# Patient Record
Sex: FEMALE | Race: WHITE | Employment: STUDENT | URBAN - METROPOLITAN AREA
[De-identification: names, ages, dates, MRNs, and addresses within clinical notes are randomized per-mention and may not be internally consistent; named-entity substitution may affect disease eponyms.]

---
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
## 2019-07-23 ENCOUNTER — HOSPITAL ENCOUNTER (OUTPATIENT)
Age: 19
Discharge: HOME | End: 2019-07-23
Attending: EMERGENCY MEDICINE
Payer: COMMERCIAL

## 2019-07-23 VITALS
RESPIRATION RATE: 18 BRPM | DIASTOLIC BLOOD PRESSURE: 80 MMHG | TEMPERATURE: 98 F | HEIGHT: 68 IN | WEIGHT: 208 LBS | HEART RATE: 84 BPM | SYSTOLIC BLOOD PRESSURE: 118 MMHG | BODY MASS INDEX: 31.52 KG/M2 | OXYGEN SATURATION: 97 %

## 2019-07-23 DIAGNOSIS — J06.9 VIRAL UPPER RESPIRATORY TRACT INFECTION: Primary | ICD-10-CM

## 2019-07-23 PROCEDURE — 99213 OFFICE O/P EST LOW 20 MIN: CPT

## 2019-07-23 PROCEDURE — 99203 OFFICE O/P NEW LOW 30 MIN: CPT

## 2019-07-23 RX ORDER — ALBUTEROL SULFATE 90 UG/1
2 AEROSOL, METERED RESPIRATORY (INHALATION) EVERY 4 HOURS PRN
Qty: 1 INHALER | Refills: 0 | Status: SHIPPED | OUTPATIENT
Start: 2019-07-23 | End: 2019-08-22

## 2019-07-23 RX ORDER — FLUTICASONE PROPIONATE 50 MCG
2 SPRAY, SUSPENSION (ML) NASAL DAILY
Qty: 16 G | Refills: 0 | Status: SHIPPED | OUTPATIENT
Start: 2019-07-23 | End: 2019-08-22

## 2019-07-23 NOTE — ED NOTES
Increase po fluids discharge inst and follow up care reviewed in detail motrin or tylenol for pain . meds reviewed and inst provided. Call make appointment for follow up care.

## 2019-07-23 NOTE — ED INITIAL ASSESSMENT (HPI)
Feels she has sinus infection with headache for 4 days.  Used motrin with some relief but feels headache

## 2019-07-23 NOTE — ED PROVIDER NOTES
Patient Seen in: [facility] In [address] [place] [address]    History   Patient presents with:  Sinus Problem    Stated Complaint: Dizziness,headache/sinus pressure    HPI      HISTORY OF PRESENT ILLNESS:Patient complains of URI symptoms for 4 days. ED Triage Vitals [07/23/19 7555]   /80   Pulse 84   Resp 18   Temp 98.3 °F (36.8 °C)   Temp src Oral   SpO2 97 %   O2 Device None (Room air)       Current:/80   Pulse 84   Temp 98.3 °F (36.8 °C) (Oral)   Resp 18   Ht 172.7 cm (5' 8\")   Wt

## 2020-07-10 ENCOUNTER — OFFICE VISIT (OUTPATIENT)
Dept: GASTROENTEROLOGY | Facility: CLINIC | Age: 20
End: 2020-07-10
Payer: COMMERCIAL

## 2020-07-10 ENCOUNTER — TELEPHONE (OUTPATIENT)
Dept: GASTROENTEROLOGY | Facility: CLINIC | Age: 20
End: 2020-07-10

## 2020-07-10 ENCOUNTER — LAB ENCOUNTER (OUTPATIENT)
Dept: LAB | Facility: HOSPITAL | Age: 20
End: 2020-07-10
Attending: INTERNAL MEDICINE
Payer: COMMERCIAL

## 2020-07-10 VITALS
BODY MASS INDEX: 33.34 KG/M2 | HEART RATE: 80 BPM | WEIGHT: 220 LBS | SYSTOLIC BLOOD PRESSURE: 122 MMHG | DIASTOLIC BLOOD PRESSURE: 76 MMHG | HEIGHT: 68 IN

## 2020-07-10 DIAGNOSIS — K52.9 CHRONIC DIARRHEA: ICD-10-CM

## 2020-07-10 DIAGNOSIS — R10.9 ABDOMINAL CRAMPING: ICD-10-CM

## 2020-07-10 DIAGNOSIS — R19.7 BLOODY DIARRHEA: Primary | ICD-10-CM

## 2020-07-10 DIAGNOSIS — K52.9 CHRONIC DIARRHEA: Primary | ICD-10-CM

## 2020-07-10 LAB
BASOPHILS # BLD AUTO: 0.03 X10(3) UL (ref 0–0.2)
BASOPHILS NFR BLD AUTO: 0.5 %
CRP SERPL-MCNC: 1.22 MG/DL (ref ?–0.3)
DEPRECATED RDW RBC AUTO: 40.9 FL (ref 35.1–46.3)
EOSINOPHIL # BLD AUTO: 0.15 X10(3) UL (ref 0–0.7)
EOSINOPHIL NFR BLD AUTO: 2.6 %
ERYTHROCYTE [DISTWIDTH] IN BLOOD BY AUTOMATED COUNT: 13.2 % (ref 11–15)
HCT VFR BLD AUTO: 39.3 % (ref 35–48)
HGB BLD-MCNC: 12.5 G/DL (ref 12–16)
IGA SERPL-MCNC: 128 MG/DL (ref 70–312)
IMM GRANULOCYTES # BLD AUTO: 0.01 X10(3) UL (ref 0–1)
IMM GRANULOCYTES NFR BLD: 0.2 %
LYMPHOCYTES # BLD AUTO: 1.96 X10(3) UL (ref 1–4)
LYMPHOCYTES NFR BLD AUTO: 34.6 %
MCH RBC QN AUTO: 26.9 PG (ref 26–34)
MCHC RBC AUTO-ENTMCNC: 31.8 G/DL (ref 31–37)
MCV RBC AUTO: 84.5 FL (ref 80–100)
MONOCYTES # BLD AUTO: 0.45 X10(3) UL (ref 0.1–1)
MONOCYTES NFR BLD AUTO: 7.9 %
NEUTROPHILS # BLD AUTO: 3.07 X10 (3) UL (ref 1.5–7.7)
NEUTROPHILS # BLD AUTO: 3.07 X10(3) UL (ref 1.5–7.7)
NEUTROPHILS NFR BLD AUTO: 54.2 %
PLATELET # BLD AUTO: 397 10(3)UL (ref 150–450)
RBC # BLD AUTO: 4.65 X10(6)UL (ref 3.8–5.3)
TSI SER-ACNC: 3.16 MIU/ML (ref 0.36–3.74)
VIT B12 SERPL-MCNC: 319 PG/ML (ref 193–986)
WBC # BLD AUTO: 5.7 X10(3) UL (ref 4–11)

## 2020-07-10 PROCEDURE — 82785 ASSAY OF IGE: CPT

## 2020-07-10 PROCEDURE — 82607 VITAMIN B-12: CPT

## 2020-07-10 PROCEDURE — 36415 COLL VENOUS BLD VENIPUNCTURE: CPT

## 2020-07-10 PROCEDURE — 84443 ASSAY THYROID STIM HORMONE: CPT

## 2020-07-10 PROCEDURE — 82784 ASSAY IGA/IGD/IGG/IGM EACH: CPT

## 2020-07-10 PROCEDURE — 83516 IMMUNOASSAY NONANTIBODY: CPT

## 2020-07-10 PROCEDURE — 85025 COMPLETE CBC W/AUTO DIFF WBC: CPT

## 2020-07-10 PROCEDURE — 99244 OFF/OP CNSLTJ NEW/EST MOD 40: CPT | Performed by: INTERNAL MEDICINE

## 2020-07-10 PROCEDURE — 86003 ALLG SPEC IGE CRUDE XTRC EA: CPT

## 2020-07-10 PROCEDURE — 86140 C-REACTIVE PROTEIN: CPT

## 2020-07-10 RX ORDER — ESCITALOPRAM OXALATE 20 MG/1
TABLET ORAL
COMMUNITY
Start: 2018-01-07 | End: 2020-07-10

## 2020-07-10 RX ORDER — IBUPROFEN 600 MG/1
600 TABLET ORAL
COMMUNITY
Start: 2016-10-05 | End: 2021-09-02

## 2020-07-10 NOTE — TELEPHONE ENCOUNTER
Scheduled for:  Colonoscopy 47294  Provider Name: Dr Salvador Brooks  Date: Wed 8/19/2020  Location: [facility]   Sedation: MAC  Time: 1:30 pm   Prep: split dose suprep   Meds/Allergies Reconciled?:  Aluminum  Diagnosis with codes:  Bloody diarrhea R19.7  Was patient infor

## 2020-07-10 NOTE — H&P
[facility], [facility] - Gastroenterology                                                                                                               Reason for consult: d 2-20 Years) data. History, Medications, Allergies, ROS:      History reviewed. No pertinent past medical history. History reviewed. No pertinent surgical history.    Family Hx:   Family History   Problem Relation Age of Onset   • Heart Disease Mother kg).    Gen- Patient appears comfortable and in no acute discomfort  HEENT: the sclera appears anicteric, oropharynx clear, mucus membranes appear moist  CV- regular rate and rhythm, the extremities are warm and well perfused   Lung- Moves air well;  No lab with MAC [Diagnosis: bloody diarrhea]    2.  bowel prep from pharmacy (split suprep)    3. Continue all medications for procedure    4. Read all bowel prep instructions carefully    5.  AVOID seeds, nuts, popcorn, raw fruits and vegetables (cooked is discrepancies may still exist.

## 2020-07-10 NOTE — PATIENT INSTRUCTIONS
1. Schedule colonoscopy with MAC [Diagnosis: bloody diarrhea]    2.  bowel prep from pharmacy (split suprep)    3. Continue all medications for procedure    4. Read all bowel prep instructions carefully    5.  AVOID seeds, nuts, popcorn, raw fruits a

## 2020-07-14 LAB
CLAM IGE QN: <0.1 KUA/L (ref ?–0.1)
CODFISH IGE QN: <0.1 KUA/L (ref ?–0.1)
CORN IGE QN: <0.1 KUA/L (ref ?–0.1)
COW MILK IGE QN: <0.1 KUA/L (ref ?–0.1)
EGG WHITE IGE QN: <0.1 KUA/L (ref ?–0.1)
IGE SERPL-ACNC: 30.7 KU/L (ref 2–214)
PEANUT IGE QN: <0.1 KUA/L (ref ?–0.1)
SCALLOP IGE QN: <0.1 KUA/L (ref ?–0.1)
SESAME SEED IGE QN: <0.1 KUA/L (ref ?–0.1)
SHRIMP IGE QN: <0.1 KUA/L (ref ?–0.1)
SOYBEAN IGE QN: <0.1 KUA/L (ref ?–0.1)
TTG IGA SER-ACNC: 0.3 U/ML (ref ?–7)
WALNUT IGE QN: <0.1 KUA/L (ref ?–0.1)
WHEAT IGE QN: <0.1 KUA/L (ref ?–0.1)

## 2020-07-16 ENCOUNTER — TELEPHONE (OUTPATIENT)
Dept: GASTROENTEROLOGY | Facility: CLINIC | Age: 20
End: 2020-07-16

## 2020-07-16 NOTE — TELEPHONE ENCOUNTER
Reviewed my chart documents patient sent:    -June 2020 labs show: normal TSH, neg Giardia, neg crypto, neg cdiff in stool.     -Labs from [facility] reviewed--neg celiac disease.  Neg food allergy panel.     -B12 low normal, okay to start Vitamin B12 1000mcg Oral

## 2020-07-16 NOTE — TELEPHONE ENCOUNTER
CRP mildly elevated, we discussed this. Not significant elevation, but will investigate with c-scope as she has diarrhea issues.

## 2020-08-18 ENCOUNTER — LAB REQUISITION (OUTPATIENT)
Dept: LAB | Facility: HOSPITAL | Age: 20
End: 2020-08-18
Payer: COMMERCIAL

## 2020-08-18 DIAGNOSIS — Z20.828 CONTACT WITH AND (SUSPECTED) EXPOSURE TO OTHER VIRAL COMMUNICABLE DISEASES: ICD-10-CM

## 2020-08-18 LAB — SARS-COV-2 RNA RESP QL NAA+PROBE: NOT DETECTED

## 2020-08-19 ENCOUNTER — ANESTHESIA (OUTPATIENT)
Dept: ENDOSCOPY | Facility: HOSPITAL | Age: 20
End: 2020-08-19
Payer: COMMERCIAL

## 2020-08-19 ENCOUNTER — ANESTHESIA EVENT (OUTPATIENT)
Dept: ENDOSCOPY | Facility: HOSPITAL | Age: 20
End: 2020-08-19
Payer: COMMERCIAL

## 2020-08-19 ENCOUNTER — HOSPITAL ENCOUNTER (OUTPATIENT)
Facility: HOSPITAL | Age: 20
Setting detail: HOSPITAL OUTPATIENT SURGERY
Discharge: HOME | End: 2020-08-19
Attending: INTERNAL MEDICINE | Admitting: INTERNAL MEDICINE
Payer: COMMERCIAL

## 2020-08-19 VITALS
SYSTOLIC BLOOD PRESSURE: 108 MMHG | RESPIRATION RATE: 15 BRPM | TEMPERATURE: 98 F | HEIGHT: 69 IN | BODY MASS INDEX: 31.84 KG/M2 | WEIGHT: 215 LBS | OXYGEN SATURATION: 99 % | DIASTOLIC BLOOD PRESSURE: 72 MMHG | HEART RATE: 66 BPM

## 2020-08-19 DIAGNOSIS — R19.7 BLOODY DIARRHEA: ICD-10-CM

## 2020-08-19 LAB — B-HCG UR QL: NEGATIVE

## 2020-08-19 PROCEDURE — 0DBP8ZX EXCISION OF RECTUM, VIA NATURAL OR ARTIFICIAL OPENING ENDOSCOPIC, DIAGNOSTIC: ICD-10-PCS | Performed by: INTERNAL MEDICINE

## 2020-08-19 PROCEDURE — 0DBL8ZX EXCISION OF TRANSVERSE COLON, VIA NATURAL OR ARTIFICIAL OPENING ENDOSCOPIC, DIAGNOSTIC: ICD-10-PCS | Performed by: INTERNAL MEDICINE

## 2020-08-19 PROCEDURE — 0DBK8ZX EXCISION OF ASCENDING COLON, VIA NATURAL OR ARTIFICIAL OPENING ENDOSCOPIC, DIAGNOSTIC: ICD-10-PCS | Performed by: INTERNAL MEDICINE

## 2020-08-19 PROCEDURE — 0DBM8ZX EXCISION OF DESCENDING COLON, VIA NATURAL OR ARTIFICIAL OPENING ENDOSCOPIC, DIAGNOSTIC: ICD-10-PCS | Performed by: INTERNAL MEDICINE

## 2020-08-19 PROCEDURE — 45385 COLONOSCOPY W/LESION REMOVAL: CPT | Performed by: INTERNAL MEDICINE

## 2020-08-19 PROCEDURE — 45380 COLONOSCOPY AND BIOPSY: CPT | Performed by: INTERNAL MEDICINE

## 2020-08-19 RX ORDER — NALOXONE HYDROCHLORIDE 0.4 MG/ML
80 INJECTION, SOLUTION INTRAMUSCULAR; INTRAVENOUS; SUBCUTANEOUS AS NEEDED
Status: DISCONTINUED | OUTPATIENT
Start: 2020-08-19 | End: 2020-08-19

## 2020-08-19 RX ORDER — SODIUM CHLORIDE, SODIUM LACTATE, POTASSIUM CHLORIDE, CALCIUM CHLORIDE 600; 310; 30; 20 MG/100ML; MG/100ML; MG/100ML; MG/100ML
INJECTION, SOLUTION INTRAVENOUS CONTINUOUS
Status: DISCONTINUED | OUTPATIENT
Start: 2020-08-19 | End: 2020-08-19

## 2020-08-19 RX ORDER — LIDOCAINE HYDROCHLORIDE 10 MG/ML
INJECTION, SOLUTION EPIDURAL; INFILTRATION; INTRACAUDAL; PERINEURAL AS NEEDED
Status: DISCONTINUED | OUTPATIENT
Start: 2020-08-19 | End: 2020-08-19 | Stop reason: SURG

## 2020-08-19 RX ADMIN — LIDOCAINE HYDROCHLORIDE 50 MG: 10 INJECTION, SOLUTION EPIDURAL; INFILTRATION; INTRACAUDAL; PERINEURAL at 13:28:00

## 2020-08-19 RX ADMIN — SODIUM CHLORIDE, SODIUM LACTATE, POTASSIUM CHLORIDE, CALCIUM CHLORIDE: 600; 310; 30; 20 INJECTION, SOLUTION INTRAVENOUS at 13:24:00

## 2020-08-19 NOTE — ANESTHESIA POSTPROCEDURE EVALUATION
Patient: Vy Yepez    Procedure Summary     Date:  08/19/20 Room / Location:  [facility] ENDOSCOPY 04 / [facility] ENDOSCOPY    Anesthesia Start:  1819 Anesthesia Stop:  4908    Procedure:  COLONOSCOPY (N/A ) Diagnosis:       Bloody diarrhea      (Colitis, p

## 2020-08-19 NOTE — H&P
History & Physical Examination    Patient Name: Luis Lam  MRN: [id number]  CSN: [id number]  YOB: 2000    Diagnosis: change in bowels, bloody diarrhea    ibuprofen 600 MG Oral Tab, Take 600 mg by mouth., Disp: , Rfl: ,  at PRN  esc reviewed the History and Physical done within the last 30 days. Any changes noted above.     Izabel Schultz, [address] [place] [address] - Gastroenterology  8/19/2020  1:19 PM

## 2020-08-19 NOTE — OPERATIVE REPORT
COLONOSCOPY REPORT    Tiny Sheldon     2000 Age 23year old   PCP Janice Julian DO Endoscopist Kerrie Craig MD     Date of procedure: 20    Procedure: Colonoscopy w/cold biopsy and cold snare polypectomy    Pre-operative diag retroflexed view of the rectum revealed mild proctitis. 5. Upon entering the rectum, there was mild erythema and granularity noted circumferentially throughout the rectum and sigmoid colon, extending to 30 cm from entry. Biopsies obtained.  Proximal to 3

## 2020-08-19 NOTE — ANESTHESIA PREPROCEDURE EVALUATION
Anesthesia PreOp Note    HPI:     Felicity Scott is a 23year old female who presents for preoperative consultation requested by: Mary Suazo MD    Date of Surgery: 8/19/2020    Procedure(s):  COLONOSCOPY  Indication: Bloody diarrhea    Relevan Other (early death) Paternal Grandfather         47 - cancer     Social History    Socioeconomic History      Marital status: Single      Spouse name: Not on file      Number of children: Not on file      Years of education: Not on file      Highest educat HGB 12.5 07/10/2020    HCT 39.3 07/10/2020    MCV 84.5 07/10/2020    MCH 26.9 07/10/2020    MCHC 31.8 07/10/2020    RDW 13.2 07/10/2020    .0 07/10/2020    URINEPREG Negative 08/19/2020             Vital Signs: Body mass index is 31.75 kg/m².    hei

## 2020-08-23 ENCOUNTER — TELEPHONE (OUTPATIENT)
Dept: GASTROENTEROLOGY | Facility: CLINIC | Age: 20
End: 2020-08-23

## 2020-08-23 NOTE — TELEPHONE ENCOUNTER
D/w patient re: C-scope pathology confirming dx of UC, mostly in lower L colon. I think enemas would be better than suppositories given it extends proximal to rect-sigmoid. She agrees to plan for mesalamine enemas x 6 weeks, then see me in clinic for f/u.

## 2020-08-24 ENCOUNTER — TELEPHONE (OUTPATIENT)
Dept: GASTROENTEROLOGY | Facility: CLINIC | Age: 20
End: 2020-08-24

## 2020-08-24 NOTE — TELEPHONE ENCOUNTER
Drug Change Request:    Plan does not cover medication prescribed. Per RX benefit plan alternative medications include:  Please fax back with approval along with strength, directions, quantity and refills:  Drugs not covered.     Current Outpatient Medicat

## 2020-08-25 NOTE — TELEPHONE ENCOUNTER
GI Clinical Staff:   Can you clarify what medications are covered? I think the message is cut off. I would like her to get some form of rectal mesalamine.

## 2020-08-25 NOTE — TELEPHONE ENCOUNTER
Dr. Jose Escalante, spoke to [facility] from [address], was informed we can disregard fax received as there was an issue with NDC that was ran through insurance. \[facility] [place] [facility]\"" Rx was reprocessed through [employer] and it is covered.

## 2020-08-25 NOTE — TELEPHONE ENCOUNTER
Dr. Charleen Torres, please see message below regarding alternative medications that are covered by patients insurance. Thank you.

## 2020-09-06 ENCOUNTER — PATIENT MESSAGE (OUTPATIENT)
Dept: GASTROENTEROLOGY | Facility: CLINIC | Age: 20
End: 2020-09-06

## 2020-09-08 NOTE — TELEPHONE ENCOUNTER
Pt contacted and informed to call the office when having acute sxs as mychart is for non-urgent matters only and typically a 72 hour turnaround time. She states she was aware it would take a couple of days for a response.      S/p CLN on 8/19/2020, pt began

## 2020-09-08 NOTE — TELEPHONE ENCOUNTER
From: Sonido Young  To: Amy Jiménez MD  Sent: 9/6/2020 3:20 PM CDT  Subject: Non-Urgent Medical Question    Hi Dr Alma Monsivais! I've been using the Mesalamine medication you gave me, and I haven't really been noticing a difference.  I am on the second

## 2020-09-09 RX ORDER — DICYCLOMINE HCL 20 MG
20 TABLET ORAL 3 TIMES DAILY PRN
Qty: 90 TABLET | Refills: 0 | Status: SHIPPED | OUTPATIENT
Start: 2020-09-09 | End: 2021-04-14

## 2020-09-09 NOTE — TELEPHONE ENCOUNTER
D/w patient -- some cramping and ab pain after eating, possibly IBS-post-inflammatory. Will start dicyclomine prn. Risks/benefits discussed. She also is having some hair loss, start MTV daily with biotin.

## 2020-10-16 ENCOUNTER — APPOINTMENT (OUTPATIENT)
Dept: LAB | Facility: HOSPITAL | Age: 20
End: 2020-10-16
Attending: INTERNAL MEDICINE
Payer: COMMERCIAL

## 2020-10-16 ENCOUNTER — HOSPITAL ENCOUNTER (OUTPATIENT)
Age: 20
Discharge: HOME | End: 2020-10-16
Attending: EMERGENCY MEDICINE
Payer: COMMERCIAL

## 2020-10-16 VITALS
TEMPERATURE: 97 F | RESPIRATION RATE: 20 BRPM | OXYGEN SATURATION: 99 % | SYSTOLIC BLOOD PRESSURE: 128 MMHG | WEIGHT: 215 LBS | HEIGHT: 69 IN | DIASTOLIC BLOOD PRESSURE: 81 MMHG | BODY MASS INDEX: 31.84 KG/M2 | HEART RATE: 90 BPM

## 2020-10-16 DIAGNOSIS — J32.9 RHINOSINUSITIS: ICD-10-CM

## 2020-10-16 DIAGNOSIS — R09.81 SINUS CONGESTION: Primary | ICD-10-CM

## 2020-10-16 DIAGNOSIS — J31.0 RHINOSINUSITIS: ICD-10-CM

## 2020-10-16 PROCEDURE — 99213 OFFICE O/P EST LOW 20 MIN: CPT | Performed by: EMERGENCY MEDICINE

## 2020-10-16 RX ORDER — FLUTICASONE PROPIONATE 50 MCG
1-2 SPRAY, SUSPENSION (ML) NASAL DAILY
Qty: 16 G | Refills: 0 | Status: SHIPPED | OUTPATIENT
Start: 2020-10-16 | End: 2020-11-15

## 2020-10-16 NOTE — ED PROVIDER NOTES
Patient Seen in: [facility]      History   Patient presents with:  Sinus Problem    Stated Complaint: congested sinus    HPI  Patient complains of 3 days of runny nose, postnasal drip and fullness in the maxillary sinuses.   No fevers or chill well-developed. Comments: Well appearing   HENT:      Head: Normocephalic and atraumatic. Right Ear: Tympanic membrane and external ear normal.      Left Ear: Tympanic membrane and external ear normal.      Nose: Rhinorrhea present.       Right Si [address] [facility] [address]  [phone]    Schedule an appointment as soon as possible for a visit in 1 week  For follow-up          Medications Prescribed:  Current Discharge Medication List    START taking these medications    Fluticasone Propionate 50

## 2020-10-17 ENCOUNTER — HOSPITAL ENCOUNTER (OUTPATIENT)
Age: 20
Discharge: HOME | End: 2020-10-17
Attending: EMERGENCY MEDICINE
Payer: COMMERCIAL

## 2020-10-17 VITALS
TEMPERATURE: 98 F | RESPIRATION RATE: 18 BRPM | OXYGEN SATURATION: 98 % | HEART RATE: 72 BPM | SYSTOLIC BLOOD PRESSURE: 131 MMHG | DIASTOLIC BLOOD PRESSURE: 72 MMHG | HEIGHT: 69 IN | WEIGHT: 215 LBS | BODY MASS INDEX: 31.84 KG/M2

## 2020-10-17 DIAGNOSIS — Z20.822 ENCOUNTER FOR LABORATORY TESTING FOR COVID-19 VIRUS: Primary | ICD-10-CM

## 2020-10-17 DIAGNOSIS — J06.9 VIRAL UPPER RESPIRATORY INFECTION: ICD-10-CM

## 2020-10-17 PROCEDURE — 99213 OFFICE O/P EST LOW 20 MIN: CPT | Performed by: EMERGENCY MEDICINE

## 2020-10-17 PROCEDURE — U0003 INFECTIOUS AGENT DETECTION BY NUCLEIC ACID (DNA OR RNA); SEVERE ACUTE RESPIRATORY SYNDROME CORONAVIRUS 2 (SARS-COV-2) (CORONAVIRUS DISEASE [COVID-19]), AMPLIFIED PROBE TECHNIQUE, MAKING USE OF HIGH THROUGHPUT TECHNOLOGIES AS DESCRIBED BY CMS-2020-01-R: HCPCS | Performed by: EMERGENCY MEDICINE

## 2020-10-17 NOTE — ED PROVIDER NOTES
Patient Seen in: [facility]      History   Patient presents with:  Testing  Cough/URI    Stated Complaint: cough/congested/wants testing/post nasal drip    HPI  Patient was seen here yesterday by me for upper respiratory congestion postnasal External ear normal.      Nose: Rhinorrhea present. Mouth/Throat:      Mouth: Mucous membranes are moist.      Pharynx: Oropharynx is clear.    Eyes:      Conjunctiva/sclera: Conjunctivae normal.   Neck:      Musculoskeletal: Normal range of motion and

## 2020-10-30 ENCOUNTER — OFFICE VISIT (OUTPATIENT)
Dept: GASTROENTEROLOGY | Facility: CLINIC | Age: 20
End: 2020-10-30
Payer: COMMERCIAL

## 2020-10-30 VITALS
WEIGHT: 224.38 LBS | DIASTOLIC BLOOD PRESSURE: 78 MMHG | SYSTOLIC BLOOD PRESSURE: 127 MMHG | HEIGHT: 69 IN | HEART RATE: 74 BPM | BODY MASS INDEX: 33.23 KG/M2

## 2020-10-30 DIAGNOSIS — K52.9 COLITIS: ICD-10-CM

## 2020-10-30 DIAGNOSIS — R53.82 CHRONIC FATIGUE: ICD-10-CM

## 2020-10-30 DIAGNOSIS — K51.311 ULCERATIVE RECTOSIGMOIDITIS WITH RECTAL BLEEDING (HCC): Primary | ICD-10-CM

## 2020-10-30 DIAGNOSIS — K52.9 CHRONIC DIARRHEA: ICD-10-CM

## 2020-10-30 PROCEDURE — 3008F BODY MASS INDEX DOCD: CPT | Performed by: INTERNAL MEDICINE

## 2020-10-30 PROCEDURE — 99214 OFFICE O/P EST MOD 30 MIN: CPT | Performed by: INTERNAL MEDICINE

## 2020-10-30 PROCEDURE — 3078F DIAST BP <80 MM HG: CPT | Performed by: INTERNAL MEDICINE

## 2020-10-30 PROCEDURE — 3074F SYST BP LT 130 MM HG: CPT | Performed by: INTERNAL MEDICINE

## 2020-10-30 RX ORDER — MESALAMINE 1.2 G/1
TABLET, DELAYED RELEASE ORAL
Qty: 180 TABLET | Refills: 0 | Status: SHIPPED | OUTPATIENT
Start: 2020-10-30 | End: 2021-07-23

## 2020-10-30 NOTE — PROGRESS NOTES
[facility] Follow-up Visit    Arti 46 - cancer      Social History: Social History    Tobacco Use      Smoking status: Never Smoker      Smokeless tobacco: Never Used    Alcohol use: Yes      Frequency: 2-4 times a month      Drinks per session: 1 or 2      Binge frequency: Never exam in all quadrants without rigidity or guarding, non-distended, no abnormal bowel sounds noted, no masses are palpated  Skin- No jaundice  Ext: no cyanosis, clubbing or edema is evident.    Neuro- Alert and oriented x4, and patient is having movement of

## 2020-10-30 NOTE — PATIENT INSTRUCTIONS
1. Start mesalamine 4.8 grams/day x 4 weeks, then reduce to 2.4grams daily long-term  2. Blood work anytime after 11/15 (non-fasting)  3.  Avoid NSAIDS

## 2020-11-30 ENCOUNTER — LAB ENCOUNTER (OUTPATIENT)
Dept: LAB | Facility: HOSPITAL | Age: 20
End: 2020-11-30
Attending: INTERNAL MEDICINE
Payer: COMMERCIAL

## 2020-11-30 DIAGNOSIS — R53.82 CHRONIC FATIGUE: ICD-10-CM

## 2020-11-30 DIAGNOSIS — K51.311 ULCERATIVE RECTOSIGMOIDITIS WITH RECTAL BLEEDING (HCC): ICD-10-CM

## 2020-11-30 PROCEDURE — 80053 COMPREHEN METABOLIC PANEL: CPT

## 2020-11-30 PROCEDURE — 36415 COLL VENOUS BLD VENIPUNCTURE: CPT

## 2020-11-30 PROCEDURE — 85025 COMPLETE CBC W/AUTO DIFF WBC: CPT

## 2020-11-30 PROCEDURE — 82306 VITAMIN D 25 HYDROXY: CPT

## 2020-12-04 ENCOUNTER — TELEPHONE (OUTPATIENT)
Dept: GASTROENTEROLOGY | Facility: CLINIC | Age: 20
End: 2020-12-04

## 2020-12-04 ENCOUNTER — PATIENT MESSAGE (OUTPATIENT)
Dept: GASTROENTEROLOGY | Facility: CLINIC | Age: 20
End: 2020-12-04

## 2020-12-04 RX ORDER — ERGOCALCIFEROL 1.25 MG/1
50000 CAPSULE ORAL
Qty: 8 CAPSULE | Refills: 0 | Status: SHIPPED | OUTPATIENT
Start: 2020-12-04 | End: 2021-01-23

## 2020-12-04 NOTE — TELEPHONE ENCOUNTER
D/w patient---vit D low, sent high dose replacement to pharmacy. She also was doing well on 4.8g/day of mesalamine, but started having sx on 2.4 g/day with diarrhea and bleeding.  I asked her to go back to 4.8g/d of mesalamine for anotherr month and then

## 2020-12-07 NOTE — TELEPHONE ENCOUNTER
From: Mini Galindo  To: Cecille Ivory MD  Sent: 12/4/2020 12:12 PM CST  Subject: Non-Urgent Medical Question    Hello! How are you? How was your thanksgiving? I'm concerned about my stool.  I started taking the mesalomine tablets 2 times a day in

## 2021-01-29 ENCOUNTER — LAB ENCOUNTER (OUTPATIENT)
Dept: LAB | Facility: HOSPITAL | Age: 21
End: 2021-01-29
Attending: INTERNAL MEDICINE
Payer: COMMERCIAL

## 2021-01-29 ENCOUNTER — OFFICE VISIT (OUTPATIENT)
Dept: GASTROENTEROLOGY | Facility: CLINIC | Age: 21
End: 2021-01-29
Payer: COMMERCIAL

## 2021-01-29 DIAGNOSIS — K51.918 ULCERATIVE COLITIS WITH OTHER COMPLICATION, UNSPECIFIED LOCATION (HCC): ICD-10-CM

## 2021-01-29 DIAGNOSIS — K51.918 ULCERATIVE COLITIS WITH OTHER COMPLICATION, UNSPECIFIED LOCATION (HCC): Primary | ICD-10-CM

## 2021-01-29 LAB
ALBUMIN SERPL-MCNC: 3.3 G/DL (ref 3.4–5)
ALBUMIN/GLOB SERPL: 0.7 {RATIO} (ref 1–2)
ALP LIVER SERPL-CCNC: 49 U/L
ALT SERPL-CCNC: 24 U/L
ANION GAP SERPL CALC-SCNC: 4 MMOL/L (ref 0–18)
AST SERPL-CCNC: 20 U/L (ref 15–37)
BASOPHILS # BLD AUTO: 0.03 X10(3) UL (ref 0–0.2)
BASOPHILS NFR BLD AUTO: 0.6 %
BILIRUB SERPL-MCNC: 0.3 MG/DL (ref 0.1–2)
BUN BLD-MCNC: 10 MG/DL (ref 7–18)
BUN/CREAT SERPL: 14.7 (ref 10–20)
CALCIUM BLD-MCNC: 8.8 MG/DL (ref 8.5–10.1)
CHLORIDE SERPL-SCNC: 108 MMOL/L (ref 98–112)
CO2 SERPL-SCNC: 28 MMOL/L (ref 21–32)
CREAT BLD-MCNC: 0.68 MG/DL
CRP SERPL-MCNC: 1.07 MG/DL (ref ?–0.3)
DEPRECATED RDW RBC AUTO: 43.2 FL (ref 35.1–46.3)
EOSINOPHIL # BLD AUTO: 0.26 X10(3) UL (ref 0–0.7)
EOSINOPHIL NFR BLD AUTO: 5 %
ERYTHROCYTE [DISTWIDTH] IN BLOOD BY AUTOMATED COUNT: 14.1 % (ref 11–15)
GLOBULIN PLAS-MCNC: 4.5 G/DL (ref 2.8–4.4)
GLUCOSE BLD-MCNC: 80 MG/DL (ref 70–99)
HCT VFR BLD AUTO: 38.2 %
HGB BLD-MCNC: 11.7 G/DL
IMM GRANULOCYTES # BLD AUTO: 0.01 X10(3) UL (ref 0–1)
IMM GRANULOCYTES NFR BLD: 0.2 %
LYMPHOCYTES # BLD AUTO: 1.78 X10(3) UL (ref 1–4)
LYMPHOCYTES NFR BLD AUTO: 34.5 %
M PROTEIN MFR SERPL ELPH: 7.8 G/DL (ref 6.4–8.2)
MCH RBC QN AUTO: 25.8 PG (ref 26–34)
MCHC RBC AUTO-ENTMCNC: 30.6 G/DL (ref 31–37)
MCV RBC AUTO: 84.3 FL
MONOCYTES # BLD AUTO: 0.42 X10(3) UL (ref 0.1–1)
MONOCYTES NFR BLD AUTO: 8.1 %
NEUTROPHILS # BLD AUTO: 2.66 X10 (3) UL (ref 1.5–7.7)
NEUTROPHILS # BLD AUTO: 2.66 X10(3) UL (ref 1.5–7.7)
NEUTROPHILS NFR BLD AUTO: 51.6 %
OSMOLALITY SERPL CALC.SUM OF ELEC: 288 MOSM/KG (ref 275–295)
PATIENT FASTING Y/N/NP: YES
PLATELET # BLD AUTO: 348 10(3)UL (ref 150–450)
POTASSIUM SERPL-SCNC: 4.2 MMOL/L (ref 3.5–5.1)
RBC # BLD AUTO: 4.53 X10(6)UL
SODIUM SERPL-SCNC: 140 MMOL/L (ref 136–145)
WBC # BLD AUTO: 5.2 X10(3) UL (ref 4–11)

## 2021-01-29 PROCEDURE — 99214 OFFICE O/P EST MOD 30 MIN: CPT | Performed by: INTERNAL MEDICINE

## 2021-01-29 PROCEDURE — 86140 C-REACTIVE PROTEIN: CPT

## 2021-01-29 PROCEDURE — 36415 COLL VENOUS BLD VENIPUNCTURE: CPT

## 2021-01-29 PROCEDURE — 80053 COMPREHEN METABOLIC PANEL: CPT

## 2021-01-29 PROCEDURE — 85025 COMPLETE CBC W/AUTO DIFF WBC: CPT

## 2021-01-29 RX ORDER — BUDESONIDE 3 MG/1
9 CAPSULE, COATED PELLETS ORAL EVERY MORNING
Qty: 90 CAPSULE | Refills: 1 | Status: SHIPPED | OUTPATIENT
Start: 2021-01-29 | End: 2021-02-28

## 2021-01-29 RX ORDER — MESALAMINE 1.2 G/1
2.4 TABLET, DELAYED RELEASE ORAL DAILY
Qty: 60 TABLET | Refills: 1 | Status: SHIPPED | OUTPATIENT
Start: 2021-01-29 | End: 2021-03-30

## 2021-01-29 NOTE — PROGRESS NOTES
[address] [facility] [address] Gastroenterology                                                                                                      Clinic Follow-up Visit    No c 1 or 2      Binge frequency: Never    Drug use: Not Currently       Medications (Active prior to today's visit):  Current Outpatient Medications   Medication Sig Dispense Refill   • Budesonide 3 MG Oral Cap DR Particles Take 3 capsules (9 mg total) by aura discussed with patient today. .  ASSESSMENT/PLAN:   25 yo F with hx of UC who presents for f/u, on mesalamine.     #L sided UC  -sigmoid/rectum, failed mesaslamine enemas, now on mesalamine 2.4/gday, improved, but still with blood mucus consistently, th

## 2021-03-10 ENCOUNTER — TELEPHONE (OUTPATIENT)
Dept: GASTROENTEROLOGY | Facility: CLINIC | Age: 21
End: 2021-03-10

## 2021-03-10 DIAGNOSIS — K51.919 ULCERATIVE COLITIS WITH COMPLICATION, UNSPECIFIED LOCATION (HCC): Primary | ICD-10-CM

## 2021-03-10 NOTE — TELEPHONE ENCOUNTER
Called patient and discussed about her condition. Stated that she had panic attack yesterday and the same symptoms she had with hyperthyroid in past.  At present she is feeling weak but denies any palpitations. Stated occasionally she has noticed anxiety.    Her levothyroxine dose was decreased earlier based on her 12/2017 labs.     Labs are ordered.  Advise to go to ER if noticed any worrisome symptoms.  Voiced understanding.      Please schedule: Flex-sig with IV sedation.      Two fleets enemas the AM of the test    Diagnosis: ulcerative colitis    Medication adjustments:  Day before procedure, hold: none  Day of procedure, hold: none      >>>Please make sure patient remains on mes

## 2021-03-18 NOTE — TELEPHONE ENCOUNTER
Scheduled for:  Flexible Sigmoidoscopy 94681  Provider Name:  Dr. Stephanie Randhawa  Date:  4/20/21  Location:    [facility]  Sedation:  IV  Time:  6138 (pt is aware to arrive at 1345)   Prep:  Two Fleets enemas, 1st admin at 1130, 2nd admin at 1230   Sent via Who@/

## 2021-04-14 ENCOUNTER — TELEPHONE (OUTPATIENT)
Dept: GASTROENTEROLOGY | Facility: CLINIC | Age: 21
End: 2021-04-14

## 2021-04-14 RX ORDER — DICYCLOMINE HCL 20 MG
20 TABLET ORAL 3 TIMES DAILY PRN
Qty: 90 TABLET | Refills: 0 | Status: SHIPPED | OUTPATIENT
Start: 2021-04-14 | End: 2021-05-14

## 2021-04-14 NOTE — TELEPHONE ENCOUNTER
Pt called for refill on dicylomine. She states that medication accidentally got wet. Pt states that she is having a flare up of colitis. Please call.

## 2021-04-14 NOTE — TELEPHONE ENCOUNTER
Patient sent MyChart notifying her that Dr. Consuelo Du refilled her prescription for Dicyclomine. Last read by Ravinder Frederick at 3:29 PM on 4/14/2021.

## 2021-04-14 NOTE — TELEPHONE ENCOUNTER
Dr. Liz Pandey,    Dicyclomine HCl 20 MG Oral Tab () 90 tablet 0 2020 10/9/2020   Sig:   Take 1 tablet (20 mg total) by mouth 3 (three) times daily as needed (abdominal pain, cramping).      Route:   Oral         Patient is requesting refill for Dicyc

## 2021-04-17 ENCOUNTER — LAB ENCOUNTER (OUTPATIENT)
Dept: LAB | Facility: HOSPITAL | Age: 21
End: 2021-04-17
Attending: INTERNAL MEDICINE
Payer: COMMERCIAL

## 2021-04-17 DIAGNOSIS — Z01.818 PREOP TESTING: ICD-10-CM

## 2021-04-20 ENCOUNTER — HOSPITAL ENCOUNTER (OUTPATIENT)
Facility: HOSPITAL | Age: 21
Setting detail: HOSPITAL OUTPATIENT SURGERY
Discharge: HOME | End: 2021-04-20
Attending: INTERNAL MEDICINE | Admitting: INTERNAL MEDICINE
Payer: COMMERCIAL

## 2021-04-20 VITALS
WEIGHT: 230 LBS | DIASTOLIC BLOOD PRESSURE: 80 MMHG | HEIGHT: 69 IN | SYSTOLIC BLOOD PRESSURE: 133 MMHG | BODY MASS INDEX: 34.07 KG/M2 | RESPIRATION RATE: 15 BRPM | OXYGEN SATURATION: 99 % | HEART RATE: 61 BPM

## 2021-04-20 DIAGNOSIS — Z01.818 PREOP TESTING: Primary | ICD-10-CM

## 2021-04-20 DIAGNOSIS — K51.919 ULCERATIVE COLITIS WITH COMPLICATION, UNSPECIFIED LOCATION (HCC): ICD-10-CM

## 2021-04-20 PROCEDURE — 0DBN8ZX EXCISION OF SIGMOID COLON, VIA NATURAL OR ARTIFICIAL OPENING ENDOSCOPIC, DIAGNOSTIC: ICD-10-PCS | Performed by: INTERNAL MEDICINE

## 2021-04-20 PROCEDURE — G0500 MOD SEDAT ENDO SERVICE >5YRS: HCPCS | Performed by: INTERNAL MEDICINE

## 2021-04-20 PROCEDURE — 45331 SIGMOIDOSCOPY AND BIOPSY: CPT | Performed by: INTERNAL MEDICINE

## 2021-04-20 PROCEDURE — 0DBM8ZX EXCISION OF DESCENDING COLON, VIA NATURAL OR ARTIFICIAL OPENING ENDOSCOPIC, DIAGNOSTIC: ICD-10-PCS | Performed by: INTERNAL MEDICINE

## 2021-04-20 PROCEDURE — 0DBP8ZX EXCISION OF RECTUM, VIA NATURAL OR ARTIFICIAL OPENING ENDOSCOPIC, DIAGNOSTIC: ICD-10-PCS | Performed by: INTERNAL MEDICINE

## 2021-04-20 RX ORDER — SODIUM CHLORIDE, SODIUM LACTATE, POTASSIUM CHLORIDE, CALCIUM CHLORIDE 600; 310; 30; 20 MG/100ML; MG/100ML; MG/100ML; MG/100ML
INJECTION, SOLUTION INTRAVENOUS CONTINUOUS
Status: DISCONTINUED | OUTPATIENT
Start: 2021-04-20 | End: 2021-04-20

## 2021-04-20 RX ORDER — MESALAMINE 1.2 G/1
TABLET, DELAYED RELEASE ORAL DAILY
COMMUNITY
End: 2021-05-12

## 2021-04-20 RX ORDER — MIDAZOLAM HYDROCHLORIDE 1 MG/ML
INJECTION INTRAMUSCULAR; INTRAVENOUS
Status: DISCONTINUED | OUTPATIENT
Start: 2021-04-20 | End: 2021-04-20

## 2021-04-20 NOTE — OPERATIVE REPORT
FLEXIBLE SIGMOIDOSCOPY REPORT    Mini Galnido     2000 Age 24year old   PCP Wolfgang Granados DO Endoscopist Cassie Jean MD     Date of procedure: 21    Procedure: Flexible sigmoidoscopy w/cold biopsy    Pre-operative diagnosis is evidence of granular mucosa with superficial ulcerations consistent with moderate colitis. 4. The mucosa transition to normal proximal to 35 cm from entry. 5. PILLO: normal     Impression:   1.  Moderate ulcerative colitis, extending to 35 cm from entry

## 2021-04-20 NOTE — H&P
History & Physical Examination    Patient Name: Jovana Kline  MRN: [id number]  CSN: [id number]  YOB: 2000    Diagnosis: ULCERATIVE COLITIS    mesalamine 1.2 g Oral Tab EC, Take by mouth daily. , Disp: , Rfl:   Dicyclomine HCl 20 MG Ora alternatives of the procedure with the patient/family. They understand and agree to proceed with plan of care. I have reviewed the History and Physical done within the last 30 days. Any changes noted above.     MD Daniel Rodrigez [id number]

## 2021-04-22 ENCOUNTER — LAB ENCOUNTER (OUTPATIENT)
Dept: LAB | Facility: HOSPITAL | Age: 21
End: 2021-04-22
Attending: ORTHOPAEDIC SURGERY
Payer: COMMERCIAL

## 2021-04-22 DIAGNOSIS — Z01.818 PREOP EXAMINATION: Primary | ICD-10-CM

## 2021-04-22 PROCEDURE — 80053 COMPREHEN METABOLIC PANEL: CPT

## 2021-04-22 PROCEDURE — 36415 COLL VENOUS BLD VENIPUNCTURE: CPT

## 2021-04-22 PROCEDURE — 85025 COMPLETE CBC W/AUTO DIFF WBC: CPT

## 2021-04-30 ENCOUNTER — TELEPHONE (OUTPATIENT)
Dept: GASTROENTEROLOGY | Facility: CLINIC | Age: 21
End: 2021-04-30

## 2021-04-30 DIAGNOSIS — K51.311 ULCERATIVE RECTOSIGMOIDITIS WITH RECTAL BLEEDING (HCC): Primary | ICD-10-CM

## 2021-04-30 NOTE — TELEPHONE ENCOUNTER
Left message for Rolan Quinteros-- biopsies from c-scope c/w UC. Will start [facility] authorization process. Asked her to get labs for hepatitis and quant Tb check. Ordered.      GI Clinical Staff:   Please start authorization [facility]

## 2021-05-03 ENCOUNTER — LAB ENCOUNTER (OUTPATIENT)
Dept: LAB | Facility: HOSPITAL | Age: 21
End: 2021-05-03
Attending: INTERNAL MEDICINE
Payer: COMMERCIAL

## 2021-05-03 ENCOUNTER — TELEPHONE (OUTPATIENT)
Dept: SURGERY | Age: 21
End: 2021-05-03

## 2021-05-03 ENCOUNTER — TELEPHONE (OUTPATIENT)
Dept: GASTROENTEROLOGY | Facility: CLINIC | Age: 21
End: 2021-05-03

## 2021-05-03 DIAGNOSIS — K62.5 RECTAL BLEEDING: Primary | ICD-10-CM

## 2021-05-03 DIAGNOSIS — K51.911 ULCERATIVE COLITIS WITH RECTAL BLEEDING, UNSPECIFIED LOCATION (HCC): ICD-10-CM

## 2021-05-03 DIAGNOSIS — K62.5 RECTAL BLEEDING: ICD-10-CM

## 2021-05-03 PROCEDURE — 86709 HEPATITIS A IGM ANTIBODY: CPT

## 2021-05-03 PROCEDURE — 86803 HEPATITIS C AB TEST: CPT

## 2021-05-03 PROCEDURE — 36415 COLL VENOUS BLD VENIPUNCTURE: CPT

## 2021-05-03 PROCEDURE — 86704 HEP B CORE ANTIBODY TOTAL: CPT

## 2021-05-03 PROCEDURE — 86706 HEP B SURFACE ANTIBODY: CPT

## 2021-05-03 PROCEDURE — 86708 HEPATITIS A ANTIBODY: CPT

## 2021-05-03 PROCEDURE — 82728 ASSAY OF FERRITIN: CPT

## 2021-05-03 PROCEDURE — 86480 TB TEST CELL IMMUN MEASURE: CPT

## 2021-05-03 PROCEDURE — 87340 HEPATITIS B SURFACE AG IA: CPT

## 2021-05-03 PROCEDURE — 85025 COMPLETE CBC W/AUTO DIFF WBC: CPT

## 2021-05-03 PROCEDURE — 80500 HEPATITIS A B + C PROFILE: CPT

## 2021-05-03 PROCEDURE — 83540 ASSAY OF IRON: CPT

## 2021-05-03 PROCEDURE — 84466 ASSAY OF TRANSFERRIN: CPT

## 2021-05-03 RX ORDER — PREDNISONE 10 MG/1
TABLET ORAL
Qty: 45 TABLET | Refills: 0 | Status: SHIPPED | OUTPATIENT
Start: 2021-05-03 | End: 2021-06-02

## 2021-05-03 NOTE — TELEPHONE ENCOUNTER
PA submitted on [employer] and supporting clinicals have been uploaded electronically. PA pending medical review. Please see referral [id number] for full documentation.

## 2021-05-03 NOTE — TELEPHONE ENCOUNTER
D/w patient- -Hgb stable. Start iron tabs which she has. Start prednisone - she is worried about reaction, had itching with medrol dose pack. No anaphylaxis. Will start low dose. Prednisone 20mg x 2 weeks, [id number] 2 weeks, then stop.  Remain on

## 2021-05-04 ENCOUNTER — TELEPHONE (OUTPATIENT)
Dept: GASTROENTEROLOGY | Facility: CLINIC | Age: 21
End: 2021-05-04

## 2021-05-04 NOTE — TELEPHONE ENCOUNTER
Delvin from [facility] called in to follow up on the prior auth they received for entivio. They are requesting it be resent with the dosage and frequency for entivio.  Please follow up fax # [phone]

## 2021-05-04 NOTE — TELEPHONE ENCOUNTER
Spoke to Onelia with [facility] who needs additional information on dosage and frequency of entyvio. Information was provided, no additonal info needed for PA.

## 2021-05-06 ENCOUNTER — PATIENT MESSAGE (OUTPATIENT)
Dept: GASTROENTEROLOGY | Facility: CLINIC | Age: 21
End: 2021-05-06

## 2021-05-06 ENCOUNTER — TELEPHONE (OUTPATIENT)
Dept: GASTROENTEROLOGY | Facility: CLINIC | Age: 21
End: 2021-05-06

## 2021-05-06 PROBLEM — K51.311 ULCERATIVE RECTOSIGMOIDITIS WITH RECTAL BLEEDING (HCC): Status: ACTIVE | Noted: 2021-05-06

## 2021-05-06 NOTE — TELEPHONE ENCOUNTER
Received fax from [facility] stating the patient is certed 3 units of J [id number] from 5/3/21 up to but not including 6/17/21.      Case ID : [id number]    Patient will need to transition to alternate infusion center after 6/16/21

## 2021-05-06 NOTE — TELEPHONE ENCOUNTER
Noted, okay to proceed with infusions [facility]) - please let patient know she can start.  She will need to find alternative infusion center after first 3 infusions

## 2021-05-06 NOTE — TELEPHONE ENCOUNTER
GI Clinical Staff:   Quant Tb testing is negative (just received result); please update prior authorization. Awaiting entyvio.  Obix

## 2021-05-07 RX ORDER — DIPHENHYDRAMINE HYDROCHLORIDE 50 MG/ML
25 INJECTION INTRAMUSCULAR; INTRAVENOUS ONCE
Status: CANCELLED
Start: 2021-05-07 | End: 2021-05-07

## 2021-05-07 RX ORDER — DIPHENHYDRAMINE HCL 25 MG
25 CAPSULE ORAL ONCE
Status: CANCELLED
Start: 2021-05-07 | End: 2021-05-07

## 2021-05-07 RX ORDER — ACETAMINOPHEN 325 MG/1
650 TABLET ORAL ONCE
Status: CANCELLED
Start: 2021-05-07 | End: 2021-05-07

## 2021-05-07 NOTE — TELEPHONE ENCOUNTER
To: [facility] GI CLINICAL STAFF      From: Shyam Phelps      Created: 5/7/2021 11:36 AM        I think im just going to wait for the entyvio. I will begin taking the 20mg of prednisone if I notice any changes from where I am right now.  Im sorry for being

## 2021-05-07 NOTE — TELEPHONE ENCOUNTER
To: [facility] CL[facility] STAFF      From: Luciana Bazzi      Created: 5/7/2021 9:58 AM      Weight gain is definitely a side effect that freaks me out. Also I read about how it can worsen depression and anxiety which are both already pretty high for me.

## 2021-05-10 NOTE — TELEPHONE ENCOUNTER
Updated referral. Infusion center staff have also updated another referral with approval information.

## 2021-05-12 ENCOUNTER — PATIENT MESSAGE (OUTPATIENT)
Dept: GASTROENTEROLOGY | Facility: CLINIC | Age: 21
End: 2021-05-12

## 2021-05-12 RX ORDER — MESALAMINE 1.2 G/1
2.4 TABLET, DELAYED RELEASE ORAL DAILY
Qty: 60 TABLET | Refills: 1 | Status: SHIPPED | OUTPATIENT
Start: 2021-05-12 | End: 2021-06-11

## 2021-05-12 NOTE — TELEPHONE ENCOUNTER
Spoke to Jefry spear at [facility] center, states patient is on her list to call. Patient notified via [address]

## 2021-05-12 NOTE — TELEPHONE ENCOUNTER
From: Jose Angel Morris  To: Dominic Chavez MD  Sent: 5/12/2021 12:11 AM CDT  Subject: Prescription Question    Hello! I was wondering when I can expect a call to schedule the infusions? Also can you send a prescription for mesalamine in for me?  Im al

## 2021-05-12 NOTE — TELEPHONE ENCOUNTER
Dr. Dena Goodman,    Patient requesting refill on mesalamine. Orders pended, please review and sign if appropriate. Thank you. I also checked in with the infusion center and the patient is on their list to call. Informed patient.

## 2021-05-18 NOTE — TELEPHONE ENCOUNTER
Dr. Lokesh Jordan,     Infusion orders to be sent to [facility] placed on your desk for review. Patient will receive doses 0,2 week at [address] [place] [address], 6 then every 8 weeks at [facility]      Please sign and return to RN, thank you.

## 2021-05-19 ENCOUNTER — OFFICE VISIT (OUTPATIENT)
Dept: HEMATOLOGY/ONCOLOGY | Facility: HOSPITAL | Age: 21
End: 2021-05-19
Attending: INTERNAL MEDICINE
Payer: COMMERCIAL

## 2021-05-19 VITALS
DIASTOLIC BLOOD PRESSURE: 72 MMHG | HEART RATE: 76 BPM | TEMPERATURE: 99 F | RESPIRATION RATE: 16 BRPM | OXYGEN SATURATION: 97 % | SYSTOLIC BLOOD PRESSURE: 130 MMHG

## 2021-05-19 DIAGNOSIS — K51.311 ULCERATIVE RECTOSIGMOIDITIS WITH RECTAL BLEEDING (HCC): Primary | ICD-10-CM

## 2021-05-19 PROCEDURE — 96365 THER/PROPH/DIAG IV INF INIT: CPT

## 2021-05-19 RX ORDER — DIPHENHYDRAMINE HCL 25 MG
CAPSULE ORAL
Status: COMPLETED
Start: 2021-05-19 | End: 2021-05-19

## 2021-05-19 RX ORDER — ACETAMINOPHEN 325 MG/1
TABLET ORAL
Status: COMPLETED
Start: 2021-05-19 | End: 2021-05-19

## 2021-05-19 RX ORDER — DIPHENHYDRAMINE HCL 25 MG
25 CAPSULE ORAL ONCE
Status: COMPLETED | OUTPATIENT
Start: 2021-05-19 | End: 2021-05-19

## 2021-05-19 RX ORDER — ACETAMINOPHEN 325 MG/1
650 TABLET ORAL ONCE
Status: CANCELLED
Start: 2021-06-02 | End: 2021-06-02

## 2021-05-19 RX ORDER — DIPHENHYDRAMINE HCL 25 MG
25 CAPSULE ORAL ONCE
Status: CANCELLED
Start: 2021-06-02 | End: 2021-06-02

## 2021-05-19 RX ORDER — ACETAMINOPHEN 325 MG/1
650 TABLET ORAL ONCE
Status: COMPLETED | OUTPATIENT
Start: 2021-05-19 | End: 2021-05-19

## 2021-05-19 RX ORDER — DIPHENHYDRAMINE HYDROCHLORIDE 50 MG/ML
25 INJECTION INTRAMUSCULAR; INTRAVENOUS ONCE
Start: 2021-06-02 | End: 2021-06-02

## 2021-05-19 RX ADMIN — ACETAMINOPHEN 650 MG: 325 TABLET ORAL at 07:56:00

## 2021-05-19 RX ADMIN — DIPHENHYDRAMINE HCL 25 MG: 25 MG CAPSULE ORAL at 07:56:00

## 2021-05-19 NOTE — PROGRESS NOTES
Lazara Dc is here for Entyvio infusion, her first one, for ulcerative colitis. She reports having had occasional bloody diarrhea. She is currently taking Mesalamine. IV started to right forearm and Entyvio infused as ordered.   She tolerated the infusion we

## 2021-05-21 NOTE — TELEPHONE ENCOUNTER
Entyvio orders and clinicals/notes sent to [facility]. Patient to receive week 6 dose and then every 8 weeks.

## 2021-05-26 NOTE — TELEPHONE ENCOUNTER
Contacted [facility] center and left voicemail for  to call back to confirm they did receive fax sent for patient to initiate PA. Please transfer to RN.

## 2021-05-28 NOTE — TELEPHONE ENCOUNTER
Confirmed with Valerie at [facility] infusion that they received my fax and they are just waiting for approval from insurance. Then they will contact patient to schedule appointment.

## 2021-06-02 ENCOUNTER — OFFICE VISIT (OUTPATIENT)
Dept: HEMATOLOGY/ONCOLOGY | Facility: HOSPITAL | Age: 21
End: 2021-06-02
Attending: INTERNAL MEDICINE
Payer: COMMERCIAL

## 2021-06-02 VITALS
SYSTOLIC BLOOD PRESSURE: 127 MMHG | TEMPERATURE: 98 F | RESPIRATION RATE: 16 BRPM | DIASTOLIC BLOOD PRESSURE: 79 MMHG | HEART RATE: 83 BPM | OXYGEN SATURATION: 97 %

## 2021-06-02 DIAGNOSIS — K51.311 ULCERATIVE RECTOSIGMOIDITIS WITH RECTAL BLEEDING (HCC): Primary | ICD-10-CM

## 2021-06-02 PROCEDURE — 96365 THER/PROPH/DIAG IV INF INIT: CPT

## 2021-06-02 RX ORDER — DIPHENHYDRAMINE HCL 25 MG
25 CAPSULE ORAL ONCE
Status: COMPLETED | OUTPATIENT
Start: 2021-06-02 | End: 2021-06-02

## 2021-06-02 RX ORDER — ACETAMINOPHEN 325 MG/1
650 TABLET ORAL ONCE
Start: 2021-06-30 | End: 2021-06-30

## 2021-06-02 RX ORDER — DIPHENHYDRAMINE HCL 25 MG
CAPSULE ORAL
Status: COMPLETED
Start: 2021-06-02 | End: 2021-06-02

## 2021-06-02 RX ORDER — DIPHENHYDRAMINE HYDROCHLORIDE 50 MG/ML
25 INJECTION INTRAMUSCULAR; INTRAVENOUS ONCE
Start: 2021-06-30 | End: 2021-06-30

## 2021-06-02 RX ORDER — DIPHENHYDRAMINE HCL 25 MG
25 CAPSULE ORAL ONCE
Start: 2021-06-30 | End: 2021-06-30

## 2021-06-02 RX ORDER — ACETAMINOPHEN 325 MG/1
650 TABLET ORAL ONCE
Status: COMPLETED | OUTPATIENT
Start: 2021-06-02 | End: 2021-06-02

## 2021-06-02 RX ORDER — ACETAMINOPHEN 325 MG/1
TABLET ORAL
Status: COMPLETED
Start: 2021-06-02 | End: 2021-06-02

## 2021-06-02 RX ADMIN — DIPHENHYDRAMINE HCL 25 MG: 25 MG CAPSULE ORAL at 07:45:00

## 2021-06-02 RX ADMIN — ACETAMINOPHEN 650 MG: 325 TABLET ORAL at 07:49:00

## 2021-06-02 NOTE — PROGRESS NOTES
Ambulatory to infusion for Entyvio infusion week 2 for ulcerative colitis. Reports some improvement in colitis symptoms. Denies any fever or flu like symptoms. IV started  and Entyvio infused as ordered.   Pt tolerated infusion well with no signs or sym

## 2021-07-02 ENCOUNTER — PATIENT MESSAGE (OUTPATIENT)
Dept: GASTROENTEROLOGY | Facility: CLINIC | Age: 21
End: 2021-07-02

## 2021-07-02 ENCOUNTER — TELEPHONE (OUTPATIENT)
Dept: GASTROENTEROLOGY | Facility: CLINIC | Age: 21
End: 2021-07-02

## 2021-07-02 NOTE — TELEPHONE ENCOUNTER
From: Clarence Squires  To: Debbie Varghese MD  Sent: 7/2/2021 12:50 PM CDT  Subject: Visit Kathy Cedeno, I have had 3 entyvio infusions so far.  I have noticed a major decrease in pain, but an increase in yellow/beige mucus in

## 2021-07-02 NOTE — TELEPHONE ENCOUNTER
Dr. Ramu Ball,    Kenton Sanchez record received from [facility]. Last infusion 6/30/21, next infusion 8/25/21. No new orders needed.

## 2021-07-08 ENCOUNTER — PATIENT MESSAGE (OUTPATIENT)
Dept: GASTROENTEROLOGY | Facility: CLINIC | Age: 21
End: 2021-07-08

## 2021-07-08 NOTE — TELEPHONE ENCOUNTER
From: Lorenza Rodriguez  To: Mendoza Cardona MD  Sent: 7/8/2021 9:15 AM CDT  Subject: Other    Hello, I had messaged you last week and you said to update you again this week. My stomach is hurting when I eat again.  There is still blood and mucus in the

## 2021-07-08 NOTE — TELEPHONE ENCOUNTER
D/w patient, still not on maintenance dosing of entyvio, so will need more time for it to work but will tx with steroid foam for UC flare. Sent to pharmacy.

## 2021-07-23 RX ORDER — MESALAMINE 1.2 G/1
2.4 TABLET, DELAYED RELEASE ORAL DAILY
Qty: 180 TABLET | Refills: 0 | Status: SHIPPED | OUTPATIENT
Start: 2021-07-23 | End: 2021-11-03

## 2021-07-23 NOTE — TELEPHONE ENCOUNTER
Requested Prescriptions     Pending Prescriptions Disp Refills   • MESALAMINE 1.2 g Oral Tab EC [Pharmacy Med Name: MESALAMINE 1.2GM TABLETS] 180 tablet 0     Sig: TAKE 4 TABLETS BY MOUTH DAILY FOR 1 MONTH, THEN TAKE 2 TABLETS BY MOUTH DAILY     LOV 1/29/2

## 2021-08-04 ENCOUNTER — PATIENT MESSAGE (OUTPATIENT)
Dept: GASTROENTEROLOGY | Facility: CLINIC | Age: 21
End: 2021-08-04

## 2021-08-04 DIAGNOSIS — K51.919 ULCERATIVE COLITIS WITH COMPLICATION, UNSPECIFIED LOCATION (HCC): Primary | ICD-10-CM

## 2021-08-04 NOTE — TELEPHONE ENCOUNTER
From: Francie Chung  To: Bonifacio Ballesteros MD  Sent: 8/4/2021 4:50 PM CDT  Subject: Non-Urgent Medical Question    Hey! Just wanted to follow up with you and let you know that I think the entyvio is working it just took a bit to get started.  The mouna

## 2021-08-11 ENCOUNTER — LAB ENCOUNTER (OUTPATIENT)
Dept: LAB | Facility: HOSPITAL | Age: 21
End: 2021-08-11
Attending: FAMILY MEDICINE
Payer: COMMERCIAL

## 2021-08-11 ENCOUNTER — HOSPITAL ENCOUNTER (OUTPATIENT)
Dept: GENERAL RADIOLOGY | Facility: HOSPITAL | Age: 21
Discharge: HOME | End: 2021-08-11
Attending: FAMILY MEDICINE
Payer: COMMERCIAL

## 2021-08-11 DIAGNOSIS — K51.919 ULCERATIVE COLITIS WITH COMPLICATION, UNSPECIFIED LOCATION (HCC): ICD-10-CM

## 2021-08-11 DIAGNOSIS — R06.2 WHEEZING: ICD-10-CM

## 2021-08-11 LAB
ALBUMIN SERPL-MCNC: 3.6 G/DL (ref 3.4–5)
ALBUMIN/GLOB SERPL: 0.8 {RATIO} (ref 1–2)
ALP LIVER SERPL-CCNC: 43 U/L
ALT SERPL-CCNC: 31 U/L
ANION GAP SERPL CALC-SCNC: 6 MMOL/L (ref 0–18)
AST SERPL-CCNC: 27 U/L (ref 15–37)
BASOPHILS # BLD AUTO: 0.02 X10(3) UL (ref 0–0.2)
BASOPHILS NFR BLD AUTO: 0.2 %
BILIRUB SERPL-MCNC: 0.3 MG/DL (ref 0.1–2)
BUN BLD-MCNC: 8 MG/DL (ref 7–18)
BUN/CREAT SERPL: 12.7 (ref 10–20)
CALCIUM BLD-MCNC: 9 MG/DL (ref 8.5–10.1)
CHLORIDE SERPL-SCNC: 106 MMOL/L (ref 98–112)
CO2 SERPL-SCNC: 26 MMOL/L (ref 21–32)
CREAT BLD-MCNC: 0.63 MG/DL
CRP SERPL-MCNC: 1.66 MG/DL (ref ?–0.3)
DEPRECATED HBV CORE AB SER IA-ACNC: 6.6 NG/ML
DEPRECATED RDW RBC AUTO: 42.3 FL (ref 35.1–46.3)
EOSINOPHIL # BLD AUTO: 0.35 X10(3) UL (ref 0–0.7)
EOSINOPHIL NFR BLD AUTO: 4.3 %
ERYTHROCYTE [DISTWIDTH] IN BLOOD BY AUTOMATED COUNT: 14.3 % (ref 11–15)
GLOBULIN PLAS-MCNC: 4.8 G/DL (ref 2.8–4.4)
GLUCOSE BLD-MCNC: 70 MG/DL (ref 70–99)
HCT VFR BLD AUTO: 38.2 %
HGB BLD-MCNC: 11.9 G/DL
IMM GRANULOCYTES # BLD AUTO: 0.01 X10(3) UL (ref 0–1)
IMM GRANULOCYTES NFR BLD: 0.1 %
IRON SATURATION: 9 %
IRON SERPL-MCNC: 50 UG/DL
LYMPHOCYTES # BLD AUTO: 2.53 X10(3) UL (ref 1–4)
LYMPHOCYTES NFR BLD AUTO: 30.9 %
M PROTEIN MFR SERPL ELPH: 8.4 G/DL (ref 6.4–8.2)
MCH RBC QN AUTO: 25.6 PG (ref 26–34)
MCHC RBC AUTO-ENTMCNC: 31.2 G/DL (ref 31–37)
MCV RBC AUTO: 82.2 FL
MONOCYTES # BLD AUTO: 0.48 X10(3) UL (ref 0.1–1)
MONOCYTES NFR BLD AUTO: 5.9 %
NEUTROPHILS # BLD AUTO: 4.79 X10 (3) UL (ref 1.5–7.7)
NEUTROPHILS # BLD AUTO: 4.79 X10(3) UL (ref 1.5–7.7)
NEUTROPHILS NFR BLD AUTO: 58.6 %
OSMOLALITY SERPL CALC.SUM OF ELEC: 283 MOSM/KG (ref 275–295)
PATIENT FASTING Y/N/NP: YES
PLATELET # BLD AUTO: 362 10(3)UL (ref 150–450)
POTASSIUM SERPL-SCNC: 4.1 MMOL/L (ref 3.5–5.1)
RBC # BLD AUTO: 4.65 X10(6)UL
SODIUM SERPL-SCNC: 138 MMOL/L (ref 136–145)
TOTAL IRON BINDING CAPACITY: 563 UG/DL (ref 240–450)
TRANSFERRIN SERPL-MCNC: 378 MG/DL (ref 200–360)
WBC # BLD AUTO: 8.2 X10(3) UL (ref 4–11)

## 2021-08-11 PROCEDURE — 85025 COMPLETE CBC W/AUTO DIFF WBC: CPT

## 2021-08-11 PROCEDURE — 86140 C-REACTIVE PROTEIN: CPT

## 2021-08-11 PROCEDURE — 83540 ASSAY OF IRON: CPT

## 2021-08-11 PROCEDURE — 80053 COMPREHEN METABOLIC PANEL: CPT

## 2021-08-11 PROCEDURE — 82728 ASSAY OF FERRITIN: CPT

## 2021-08-11 PROCEDURE — 71046 X-RAY EXAM CHEST 2 VIEWS: CPT | Performed by: FAMILY MEDICINE

## 2021-08-11 PROCEDURE — 36415 COLL VENOUS BLD VENIPUNCTURE: CPT

## 2021-08-11 PROCEDURE — 84466 ASSAY OF TRANSFERRIN: CPT

## 2021-08-16 ENCOUNTER — TELEPHONE (OUTPATIENT)
Dept: GASTROENTEROLOGY | Facility: CLINIC | Age: 21
End: 2021-08-16

## 2021-08-16 RX ORDER — PNV NO.95/FERROUS FUM/FOLIC AC 28MG-0.8MG
1 TABLET ORAL DAILY
Qty: 90 TABLET | Refills: 0 | Status: SHIPPED | OUTPATIENT
Start: 2021-08-16 | End: 2021-11-19

## 2021-08-18 ENCOUNTER — TELEPHONE (OUTPATIENT)
Dept: GASTROENTEROLOGY | Facility: CLINIC | Age: 21
End: 2021-08-18

## 2021-08-18 NOTE — TELEPHONE ENCOUNTER
Contacted patient via [facility] to inform her that this medication is available otc as her insurance will not cover it.

## 2021-08-18 NOTE — TELEPHONE ENCOUNTER
Current Outpatient Medications   Medication Sig Dispense Refill   • Ferrous Sulfate (IRON) 325 (65 Fe) MG Oral Tab Take 1 tablet by mouth daily. 90 tablet 0     Per pharmacy patient's insurance does not cover this medication.   Please call insurance at [phone]-

## 2021-08-26 ENCOUNTER — APPOINTMENT (OUTPATIENT)
Dept: URBAN - METROPOLITAN AREA CLINIC 321 | Age: 21
Setting detail: DERMATOLOGY
End: 2021-08-26

## 2021-08-26 DIAGNOSIS — D22 MELANOCYTIC NEVI: ICD-10-CM

## 2021-08-26 PROBLEM — D22.62 MELANOCYTIC NEVI OF LEFT UPPER LIMB, INCLUDING SHOULDER: Status: ACTIVE | Noted: 2021-08-26

## 2021-08-26 PROCEDURE — 99024 POSTOP FOLLOW-UP VISIT: CPT

## 2021-08-26 PROCEDURE — OTHER SUTURE REMOVAL (GLOBAL PERIOD): OTHER

## 2021-08-26 ASSESSMENT — LOCATION SIMPLE DESCRIPTION DERM: LOCATION SIMPLE: LEFT SHOULDER

## 2021-08-26 ASSESSMENT — LOCATION ZONE DERM: LOCATION ZONE: ARM

## 2021-08-26 ASSESSMENT — LOCATION DETAILED DESCRIPTION DERM: LOCATION DETAILED: LEFT ANTERIOR SHOULDER

## 2021-08-26 NOTE — PROCEDURE: SUTURE REMOVAL (GLOBAL PERIOD)
Add 67556 Cpt? (Important Note: In 2017 The Use Of 75864 Is Being Tracked By Cms To Determine Future Global Period Reimbursement For Global Periods): yes
Body Location Override (Optional - Billing Will Still Be Based On Selected Body Map Location If Applicable): Left upper arm
Detail Level: Simple

## 2021-08-27 ENCOUNTER — TELEPHONE (OUTPATIENT)
Dept: GASTROENTEROLOGY | Facility: CLINIC | Age: 21
End: 2021-08-27

## 2021-08-27 NOTE — TELEPHONE ENCOUNTER
Entyvio record received from [facility]. Last infusion 8/25/21, next infusion 10/21/21. No new orders needed. Sent to scanning.

## 2021-09-02 ENCOUNTER — HOSPITAL ENCOUNTER (EMERGENCY)
Facility: HOSPITAL | Age: 21
Discharge: HOME | End: 2021-09-03
Attending: EMERGENCY MEDICINE
Payer: COMMERCIAL

## 2021-09-02 ENCOUNTER — APPOINTMENT (OUTPATIENT)
Dept: GENERAL RADIOLOGY | Facility: HOSPITAL | Age: 21
End: 2021-09-02
Attending: EMERGENCY MEDICINE
Payer: COMMERCIAL

## 2021-09-02 DIAGNOSIS — J45.21 MILD INTERMITTENT ASTHMA WITH EXACERBATION: Primary | ICD-10-CM

## 2021-09-02 PROCEDURE — 71045 X-RAY EXAM CHEST 1 VIEW: CPT | Performed by: EMERGENCY MEDICINE

## 2021-09-02 PROCEDURE — 99283 EMERGENCY DEPT VISIT LOW MDM: CPT

## 2021-09-02 RX ORDER — PREDNISONE 20 MG/1
20 TABLET ORAL ONCE
Status: COMPLETED | OUTPATIENT
Start: 2021-09-02 | End: 2021-09-02

## 2021-09-03 VITALS
BODY MASS INDEX: 34 KG/M2 | RESPIRATION RATE: 14 BRPM | WEIGHT: 229.25 LBS | DIASTOLIC BLOOD PRESSURE: 74 MMHG | TEMPERATURE: 98 F | OXYGEN SATURATION: 98 % | HEART RATE: 74 BPM | SYSTOLIC BLOOD PRESSURE: 122 MMHG

## 2021-09-03 RX ORDER — PREDNISONE 20 MG/1
20 TABLET ORAL DAILY
Qty: 5 TABLET | Refills: 0 | Status: SHIPPED | OUTPATIENT
Start: 2021-09-03 | End: 2021-09-08

## 2021-09-03 RX ORDER — MONTELUKAST SODIUM 10 MG/1
10 TABLET ORAL NIGHTLY
Qty: 30 TABLET | Refills: 0 | Status: SHIPPED | OUTPATIENT
Start: 2021-09-03 | End: 2021-10-03

## 2021-09-03 NOTE — ED PROVIDER NOTES
Patient Seen in: [facility] AND [facility] Emergency Department    History   Patient presents with:  Difficulty Breathing      HPI    The patient presents to the ED complaining of intermittent episodes of shortness of breath with expiratory wheezing over the pas Concerns:        Pt has a pacemaker: No        Pt has a defibrillator: No        Reaction to local anesthetic: No      ROS  Pertinent positives: Wheezing, shortness of breath, cough  All other organ systems are reviewed and are negative.     Constitutional alert and oriented to person, place, and time.    Psychiatric:         Mood and Affect: Mood normal.         Behavior: Behavior normal.         ED Course      Labs Reviewed - No data to display      Imaging Results Available and Reviewed while in ED: Prelim procedures and reviewed those reports. Complicating Factors: The patient already has does not have any pertinent problems on file. to contribute to the complexity of this ED evaluation.     ED Course: Patient presents to the ED with possible intermittent

## 2021-09-04 ENCOUNTER — LAB ENCOUNTER (OUTPATIENT)
Dept: LAB | Facility: HOSPITAL | Age: 21
End: 2021-09-04
Attending: FAMILY MEDICINE
Payer: COMMERCIAL

## 2021-09-04 DIAGNOSIS — Z13.9 ENCOUNTER FOR SCREENING: ICD-10-CM

## 2021-09-04 LAB — SARS-COV-2 RNA RESP QL NAA+PROBE: NOT DETECTED

## 2021-09-07 ENCOUNTER — HOSPITAL ENCOUNTER (OUTPATIENT)
Dept: RESPIRATORY THERAPY | Facility: HOSPITAL | Age: 21
Discharge: HOME | End: 2021-09-07
Attending: FAMILY MEDICINE
Payer: COMMERCIAL

## 2021-09-07 DIAGNOSIS — R06.00 DYSPNEA ON EXERTION: ICD-10-CM

## 2021-09-07 DIAGNOSIS — R06.09 OTHER FORMS OF DYSPNEA: ICD-10-CM

## 2021-09-07 PROCEDURE — 94726 PLETHYSMOGRAPHY LUNG VOLUMES: CPT

## 2021-09-07 PROCEDURE — 94060 EVALUATION OF WHEEZING: CPT

## 2021-09-07 PROCEDURE — 94729 DIFFUSING CAPACITY: CPT

## 2021-09-07 NOTE — PROCEDURES
Pulmonary Function Test     IMPRESSION:  Mild obstruction present. No significant bronchodilator response. Lung volumes and DLCO are normal. Testing limited by frequent coughing.  Inspiratory flow loop is flattened and may suggest upper airway obstruction s

## 2021-09-14 ENCOUNTER — OFFICE VISIT (OUTPATIENT)
Dept: OTOLARYNGOLOGY | Facility: CLINIC | Age: 21
End: 2021-09-14
Payer: COMMERCIAL

## 2021-09-14 VITALS — WEIGHT: 229 LBS | BODY MASS INDEX: 34 KG/M2

## 2021-09-14 DIAGNOSIS — K21.9 GASTROESOPHAGEAL REFLUX DISEASE, UNSPECIFIED WHETHER ESOPHAGITIS PRESENT: Primary | ICD-10-CM

## 2021-09-14 DIAGNOSIS — R06.2 WHEEZING: ICD-10-CM

## 2021-09-14 DIAGNOSIS — R05.9 COUGH: ICD-10-CM

## 2021-09-14 PROCEDURE — 31575 DIAGNOSTIC LARYNGOSCOPY: CPT | Performed by: SPECIALIST

## 2021-09-14 PROCEDURE — 99213 OFFICE O/P EST LOW 20 MIN: CPT | Performed by: SPECIALIST

## 2021-09-14 RX ORDER — ALBUTEROL SULFATE 90 UG/1
AEROSOL, METERED RESPIRATORY (INHALATION)
COMMUNITY
Start: 2021-09-14

## 2021-09-14 RX ORDER — OMEPRAZOLE 40 MG/1
40 CAPSULE, DELAYED RELEASE ORAL DAILY
Qty: 30 CAPSULE | Refills: 2 | Status: SHIPPED | OUTPATIENT
Start: 2021-09-14 | End: 2021-10-30

## 2021-09-14 NOTE — PATIENT INSTRUCTIONS
No greasy foods, spicy foods, chocolate, caffeine, alcohol, spearmint, peppermint, lemon, lime, orange, grapefruit, tomatoes. Don't eat 2 hours before bedtime  Elevate the head of bed  Trial of omeprazole.   Call me in 3 to 4 weeks time, sooner if problems

## 2021-09-14 NOTE — PROGRESS NOTES
Xenia Vargas is a 24year old female.  Patient presents with:  Throat Problem: pt is here today for having a cough she had for over three month, she did have a pulmonary function test and it showed a possible vocal cord obstruction     HPI:   Chron appearance - Normal.   Head/Face Facial features - Normal. Eyebrows - Normal. Skull - Normal.   Eyes Conjunctiva - Right: Normal, Left: Normal. Pupil - Right: Normal, Left: Normal.    Ears Inspection - Right: Normal, Left: Normal.   Canal - Right: Normal, patient indicates understanding of these issues and agrees to the plan.       Lokesh Sarmiento MD  9/14/2021  5:24 PM

## 2021-09-20 ENCOUNTER — HOSPITAL ENCOUNTER (OUTPATIENT)
Dept: GENERAL RADIOLOGY | Facility: HOSPITAL | Age: 21
Discharge: HOME | End: 2021-09-20
Attending: INTERNAL MEDICINE
Payer: COMMERCIAL

## 2021-09-20 DIAGNOSIS — R07.81 PLEURODYNIA: ICD-10-CM

## 2021-09-20 PROCEDURE — 71101 X-RAY EXAM UNILAT RIBS/CHEST: CPT | Performed by: INTERNAL MEDICINE

## 2021-10-04 ENCOUNTER — LAB ENCOUNTER (OUTPATIENT)
Dept: LAB | Facility: HOSPITAL | Age: 21
End: 2021-10-04
Attending: ALLERGY & IMMUNOLOGY
Payer: COMMERCIAL

## 2021-10-04 DIAGNOSIS — R05.1 ACUTE COUGH: Primary | ICD-10-CM

## 2021-10-04 DIAGNOSIS — L50.1 IDIOPATHIC URTICARIA: ICD-10-CM

## 2021-10-04 DIAGNOSIS — J01.90 ACUTE SINUSITIS, UNSPECIFIED: ICD-10-CM

## 2021-10-04 DIAGNOSIS — J31.0 CHRONIC RHINITIS: ICD-10-CM

## 2021-10-04 PROCEDURE — 84443 ASSAY THYROID STIM HORMONE: CPT

## 2021-10-04 PROCEDURE — 83520 IMMUNOASSAY QUANT NOS NONAB: CPT

## 2021-10-04 PROCEDURE — 80053 COMPREHEN METABOLIC PANEL: CPT

## 2021-10-04 PROCEDURE — 85025 COMPLETE CBC W/AUTO DIFF WBC: CPT

## 2021-10-04 PROCEDURE — 36415 COLL VENOUS BLD VENIPUNCTURE: CPT

## 2021-10-21 ENCOUNTER — TELEPHONE (OUTPATIENT)
Dept: GASTROENTEROLOGY | Facility: CLINIC | Age: 21
End: 2021-10-21

## 2021-10-21 NOTE — TELEPHONE ENCOUNTER
Entyvio record received from [facility] 3. Last infusion 10/21/21, next infusion 12/20/21. No new orders needed, sent to scanning.     GAETANO- Dr. Julianne Farris

## 2021-10-30 RX ORDER — OMEPRAZOLE 40 MG/1
CAPSULE, DELAYED RELEASE ORAL
Qty: 90 CAPSULE | Refills: 0 | Status: SHIPPED | OUTPATIENT
Start: 2021-10-30 | End: 2022-10-17

## 2021-10-30 NOTE — TELEPHONE ENCOUNTER
Dr Rainer Mcdonald patient stated the medication Omeprazole is helping her symptoms asking for refill and sent.

## 2021-11-02 NOTE — TELEPHONE ENCOUNTER
Requested Prescriptions     Pending Prescriptions Disp Refills   • MESALAMINE 1.2 g Oral Tab EC [Pharmacy Med Name: MESALAMINE 1.2GM TABLETS] 180 tablet 0     Sig: TAKE 2 TABLETS(2.4 GRAMS) BY MOUTH DAILY     LOV 1/29/2021  Last Flex-Sig 4/20/2021  LR  7/2

## 2021-11-03 RX ORDER — MESALAMINE 1.2 G/1
TABLET, DELAYED RELEASE ORAL
Qty: 180 TABLET | Refills: 0 | Status: SHIPPED | OUTPATIENT
Start: 2021-11-03 | End: 2021-11-19

## 2021-11-08 ENCOUNTER — LAB ENCOUNTER (OUTPATIENT)
Dept: LAB | Facility: HOSPITAL | Age: 21
End: 2021-11-08
Attending: INTERNAL MEDICINE
Payer: COMMERCIAL

## 2021-11-08 DIAGNOSIS — J45.909 ASTHMA: ICD-10-CM

## 2021-11-08 DIAGNOSIS — Z01.818 PREOP EXAMINATION: ICD-10-CM

## 2021-11-08 DIAGNOSIS — Z11.59 ENCOUNTER FOR SCREENING FOR OTHER VIRAL DISEASES: ICD-10-CM

## 2021-11-19 RX ORDER — MESALAMINE 1.2 G/1
TABLET, DELAYED RELEASE ORAL
Qty: 180 TABLET | Refills: 0 | Status: SHIPPED | OUTPATIENT
Start: 2021-11-19 | End: 2022-05-25

## 2021-11-19 RX ORDER — FERROUS SULFATE 325(65) MG
1 TABLET ORAL DAILY
Qty: 30 TABLET | Refills: 0 | Status: SHIPPED | OUTPATIENT
Start: 2021-11-19 | End: 2021-12-19

## 2021-12-16 ENCOUNTER — OFFICE VISIT (OUTPATIENT)
Dept: FAMILY MEDICINE CLINIC | Facility: CLINIC | Age: 21
End: 2021-12-16
Payer: COMMERCIAL

## 2021-12-16 VITALS
BODY MASS INDEX: 34.07 KG/M2 | OXYGEN SATURATION: 97 % | HEART RATE: 78 BPM | SYSTOLIC BLOOD PRESSURE: 133 MMHG | TEMPERATURE: 98 F | HEIGHT: 69 IN | DIASTOLIC BLOOD PRESSURE: 87 MMHG | WEIGHT: 230 LBS | RESPIRATION RATE: 18 BRPM

## 2021-12-16 DIAGNOSIS — J06.9 UPPER RESPIRATORY TRACT INFECTION, UNSPECIFIED TYPE: ICD-10-CM

## 2021-12-16 DIAGNOSIS — J02.9 PHARYNGITIS, UNSPECIFIED ETIOLOGY: Primary | ICD-10-CM

## 2021-12-16 PROCEDURE — 87880 STREP A ASSAY W/OPTIC: CPT | Performed by: NURSE PRACTITIONER

## 2021-12-16 PROCEDURE — 3008F BODY MASS INDEX DOCD: CPT | Performed by: NURSE PRACTITIONER

## 2021-12-16 PROCEDURE — 99212 OFFICE O/P EST SF 10 MIN: CPT | Performed by: NURSE PRACTITIONER

## 2021-12-16 PROCEDURE — 3079F DIAST BP 80-89 MM HG: CPT | Performed by: NURSE PRACTITIONER

## 2021-12-16 PROCEDURE — 3075F SYST BP GE 130 - 139MM HG: CPT | Performed by: NURSE PRACTITIONER

## 2021-12-16 RX ORDER — NORETHINDRONE ACETATE AND ETHINYL ESTRADIOL 1MG-20(21)
KIT ORAL
COMMUNITY

## 2021-12-29 ENCOUNTER — TELEPHONE (OUTPATIENT)
Dept: GASTROENTEROLOGY | Facility: CLINIC | Age: 21
End: 2021-12-29

## 2021-12-29 NOTE — TELEPHONE ENCOUNTER
GAETANO for Dr Ranulfo Coles/[facility] called to let this office know that pt is scheduled for 1/4/22 for Entyvio infusion. Pt received last infusion on 10/21/21.

## 2022-01-06 ENCOUNTER — TELEPHONE (OUTPATIENT)
Dept: GASTROENTEROLOGY | Facility: CLINIC | Age: 22
End: 2022-01-06

## 2022-01-06 NOTE — TELEPHONE ENCOUNTER
Entyvio infusion record received from [employer]. Last infusion 1/4/22, next infusion 3/4/22. No new orders needed. Sent to scanning.      PRADIP Cabrera

## 2022-03-09 ENCOUNTER — TELEPHONE (OUTPATIENT)
Dept: GASTROENTEROLOGY | Facility: CLINIC | Age: 22
End: 2022-03-09

## 2022-03-09 NOTE — TELEPHONE ENCOUNTER
Entyvio record received from [facility] 3. Last infusion 3/7/22, next infusion 5/2/22. No new orders needed, sent to scanning.

## 2022-04-12 ENCOUNTER — APPOINTMENT (OUTPATIENT)
Dept: URBAN - METROPOLITAN AREA CLINIC 244 | Age: 22
Setting detail: DERMATOLOGY
End: 2022-04-13

## 2022-04-12 DIAGNOSIS — L30.1 DYSHIDROSIS [POMPHOLYX]: ICD-10-CM

## 2022-04-12 DIAGNOSIS — B07.8 OTHER VIRAL WARTS: ICD-10-CM

## 2022-04-12 PROCEDURE — 99214 OFFICE O/P EST MOD 30 MIN: CPT | Mod: 25

## 2022-04-12 PROCEDURE — OTHER COUNSELING: OTHER

## 2022-04-12 PROCEDURE — 17110 DESTRUCT B9 LESION 1-14: CPT

## 2022-04-12 PROCEDURE — OTHER PRESCRIPTION: OTHER

## 2022-04-12 PROCEDURE — OTHER LIQUID NITROGEN: OTHER

## 2022-04-12 RX ORDER — TRIAMCINOLONE ACETONIDE 1 MG/G
CREAM TOPICAL
Qty: 80 | Refills: 1 | Status: ERX

## 2022-04-12 ASSESSMENT — LOCATION DETAILED DESCRIPTION DERM
LOCATION DETAILED: LEFT INDEX FINGERNAIL
LOCATION DETAILED: RIGHT MIDDLE FINGER DISTAL INTERPHALANGEAL JOINT
LOCATION DETAILED: LEFT MIDDLE FINGER LUNULA
LOCATION DETAILED: RIGHT MID ULNAR DORSAL MIDDLE FINGER
LOCATION DETAILED: RIGHT MEDIAL PLANTAR 1ST TOE

## 2022-04-12 ASSESSMENT — LOCATION ZONE DERM
LOCATION ZONE: TOE
LOCATION ZONE: FINGER
LOCATION ZONE: FINGERNAIL

## 2022-04-12 ASSESSMENT — LOCATION SIMPLE DESCRIPTION DERM
LOCATION SIMPLE: LEFT MIDDLE FINGERNAIL
LOCATION SIMPLE: LEFT INDEX FINGERNAIL
LOCATION SIMPLE: RIGHT MIDDLE FINGER
LOCATION SIMPLE: PLANTAR SURFACE OF RIGHT 1ST TOE

## 2022-04-12 NOTE — PROCEDURE: LIQUID NITROGEN
Render Post-Care Instructions In Note?: yes
Medical Necessity Clause: This procedure was medically necessary because the lesions that were treated were:
Spray Paint Text: The liquid nitrogen was applied to the skin utilizing a spray paint frosting technique.
Consent: The patient's consent was obtained including but not limited to risks of crusting, scabbing, blistering, scarring, darker or lighter pigmentary change, recurrence, incomplete removal/need for repeat treatments, and infection. Patient understands that treatment on feet/hands may limit daily activities if pain is significant after treatment and/or if blistering occurs.
Post-Care Instructions: I reviewed with the patient in detail post-care instructions. Patient is to wear sunprotection, and avoid picking at any of the treated lesions. Pt may apply Vaseline to crusted or scabbing areas. If blistering occurs, pt understands to not pop blister and can cover with Vaseline + Band-Aid. If any topicals are prescribed (i.e wart peel or topical S.A), pt is to wait 5-7 days before applying to treated areas.
Add 52 Modifier (Optional): no
Detail Level: Detailed
Medical Necessity Information: It is in your best interest to select a reason for this procedure from the list below. All of these items fulfill various CMS LCD requirements except the new and changing color options.
Number Of Freeze-Thaw Cycles: 1 freeze-thaw cycle
Application Tool (Optional): Liquid Nitrogen Sprayer
Duration Of Freeze Thaw-Cycle (Seconds): 5-10

## 2022-04-12 NOTE — PROCEDURE: COUNSELING
Cryotherapy Recommendations: Recommend cryodestruction/cryotherapy to warts given their viral nature and propensity to grow or spread in some individuals, but I discussed the risk of blistering, pain, dyschromia, need for repeat treatments, rare risk of scarring with treatment, among other risks associated with treatment. Advised pt that treatment on feet/hands may limit daily activities if pain is significant after treatment. Risks, benefits, and alternatives of cryotherapy discussed.
Topical Salicylic Acid Recommendations: Always wait 5-7 days after cryotherapy/cryodestruction before using.
Detail Level: Zone
Detail Level: Detailed

## 2022-05-12 ENCOUNTER — TELEPHONE (OUTPATIENT)
Dept: GASTROENTEROLOGY | Facility: CLINIC | Age: 22
End: 2022-05-12

## 2022-05-12 NOTE — TELEPHONE ENCOUNTER
Entyvio infusion record received from [facility] 3. Last infusion 5/10/22, next infusion 7/5/22. No new orders needed, sent to scanning.      PRADIP Limon

## 2022-05-16 ENCOUNTER — PATIENT MESSAGE (OUTPATIENT)
Dept: GASTROENTEROLOGY | Facility: CLINIC | Age: 22
End: 2022-05-16

## 2022-05-17 NOTE — TELEPHONE ENCOUNTER
From: Rian Burns  To: Tony Talbert MD  Sent: 5/16/2022 6:17 PM CDT  Subject: Medication question/refill    Paxton Cabrera, I went in for my infusion and they told me that the next one (in june) is the last one on this perscription and that I will probably need to contact you. I was wondering what you would think about trying an injection medication that I can do myself? I was reading about Humira and Cimzia. The infusions are working and I only get pain when I eat certain things, i just think the shot would be more convenient. Should I make an appointment to come see you so we can figure out if Id be better off continuing entyvio or switch? Thanks!

## 2022-05-18 NOTE — TELEPHONE ENCOUNTER
Dr. Ortega Zhu form for [facility] placed on your desk for review and signature. Thank you.

## 2022-05-21 ENCOUNTER — TELEPHONE (OUTPATIENT)
Dept: GASTROENTEROLOGY | Facility: CLINIC | Age: 22
End: 2022-05-21

## 2022-05-21 DIAGNOSIS — K51.919 ULCERATIVE COLITIS WITH COMPLICATION, UNSPECIFIED LOCATION (HCC): Primary | ICD-10-CM

## 2022-05-21 RX ORDER — MESALAMINE 1.2 G/1
2.4 TABLET, DELAYED RELEASE ORAL DAILY
Qty: 180 TABLET | Refills: 0 | OUTPATIENT
Start: 2022-05-21

## 2022-05-21 RX ORDER — LIDOCAINE HYDROCHLORIDE 20 MG/ML
SOLUTION ORAL; TOPICAL
Qty: 30 TABLET | Refills: 0 | OUTPATIENT
Start: 2022-05-21

## 2022-05-21 NOTE — TELEPHONE ENCOUNTER
GI Staff:   Please have patient make appt to see [address] She is on IV entyvio for L sided UC. She no longer needs mesalamine. She needs blood work as ordered.  thx

## 2022-05-23 ENCOUNTER — RX ONLY (RX ONLY)
Age: 22
End: 2022-05-23

## 2022-05-23 RX ORDER — FLUOCINONIDE 0.5 MG/ML
SOLUTION TOPICAL
Qty: 60 | Refills: 0 | Status: ERX

## 2022-05-23 NOTE — TELEPHONE ENCOUNTER
Spoke with Juan. Discussed Dr. Kaur Medina recommendations / additional testing as outlined below. Tuesdays is the only day she can come to clinic. In class the other days. Currently working on new entyvio orders and will keep her updated. Instructed blood work placed to complete at her earliest convenience. Accepted f/u appointment with jess tomorrow at 2:00 pm.     Confirming with GI supervisor ok to utilize. Verified time, location and to arrive 15 minutes early. Patient expressed understanding with no further questions or concerns at this time.      Future Appointments   Date Time Provider Yaya Chaudhary   5/24/2022  2:00 PM JESS Reveles [facility] Darryn SCHWARZ

## 2022-05-24 ENCOUNTER — LAB ENCOUNTER (OUTPATIENT)
Dept: LAB | Facility: HOSPITAL | Age: 22
End: 2022-05-24
Attending: INTERNAL MEDICINE
Payer: COMMERCIAL

## 2022-05-24 ENCOUNTER — OFFICE VISIT (OUTPATIENT)
Dept: GASTROENTEROLOGY | Facility: CLINIC | Age: 22
End: 2022-05-24
Payer: COMMERCIAL

## 2022-05-24 ENCOUNTER — TELEPHONE (OUTPATIENT)
Dept: GASTROENTEROLOGY | Facility: CLINIC | Age: 22
End: 2022-05-24

## 2022-05-24 VITALS
HEIGHT: 69 IN | WEIGHT: 241 LBS | BODY MASS INDEX: 35.7 KG/M2 | DIASTOLIC BLOOD PRESSURE: 85 MMHG | HEART RATE: 80 BPM | SYSTOLIC BLOOD PRESSURE: 127 MMHG

## 2022-05-24 DIAGNOSIS — K51.819 OTHER ULCERATIVE COLITIS WITH COMPLICATION (HCC): Primary | ICD-10-CM

## 2022-05-24 DIAGNOSIS — K51.919 ULCERATIVE COLITIS WITH COMPLICATION, UNSPECIFIED LOCATION (HCC): Primary | ICD-10-CM

## 2022-05-24 DIAGNOSIS — K51.919 ULCERATIVE COLITIS WITH COMPLICATION, UNSPECIFIED LOCATION (HCC): ICD-10-CM

## 2022-05-24 LAB
ALBUMIN SERPL-MCNC: 3.4 G/DL (ref 3.4–5)
ALBUMIN/GLOB SERPL: 0.8 {RATIO} (ref 1–2)
ALP LIVER SERPL-CCNC: 51 U/L
ALT SERPL-CCNC: 29 U/L
ANION GAP SERPL CALC-SCNC: 4 MMOL/L (ref 0–18)
AST SERPL-CCNC: 22 U/L (ref 15–37)
BASOPHILS # BLD AUTO: 0.02 X10(3) UL (ref 0–0.2)
BASOPHILS NFR BLD AUTO: 0.4 %
BILIRUB SERPL-MCNC: 0.1 MG/DL (ref 0.1–2)
BUN BLD-MCNC: 10 MG/DL (ref 7–18)
BUN/CREAT SERPL: 14.7 (ref 10–20)
CALCIUM BLD-MCNC: 9.1 MG/DL (ref 8.5–10.1)
CHLORIDE SERPL-SCNC: 107 MMOL/L (ref 98–112)
CO2 SERPL-SCNC: 27 MMOL/L (ref 21–32)
CREAT BLD-MCNC: 0.68 MG/DL
CRP SERPL-MCNC: 0.9 MG/DL (ref ?–0.3)
DEPRECATED RDW RBC AUTO: 41.7 FL (ref 35.1–46.3)
EOSINOPHIL # BLD AUTO: 0.31 X10(3) UL (ref 0–0.7)
EOSINOPHIL NFR BLD AUTO: 5.5 %
ERYTHROCYTE [DISTWIDTH] IN BLOOD BY AUTOMATED COUNT: 12.9 % (ref 11–15)
FASTING STATUS PATIENT QL REPORTED: NO
GLOBULIN PLAS-MCNC: 4.5 G/DL (ref 2.8–4.4)
GLUCOSE BLD-MCNC: 103 MG/DL (ref 70–99)
HCT VFR BLD AUTO: 44.6 %
HGB BLD-MCNC: 14.1 G/DL
IMM GRANULOCYTES # BLD AUTO: 0 X10(3) UL (ref 0–1)
IMM GRANULOCYTES NFR BLD: 0 %
LYMPHOCYTES # BLD AUTO: 2.2 X10(3) UL (ref 1–4)
LYMPHOCYTES NFR BLD AUTO: 38.8 %
MCH RBC QN AUTO: 28.2 PG (ref 26–34)
MCHC RBC AUTO-ENTMCNC: 31.6 G/DL (ref 31–37)
MCV RBC AUTO: 89.2 FL
MONOCYTES # BLD AUTO: 0.38 X10(3) UL (ref 0.1–1)
MONOCYTES NFR BLD AUTO: 6.7 %
NEUTROPHILS # BLD AUTO: 2.76 X10 (3) UL (ref 1.5–7.7)
NEUTROPHILS # BLD AUTO: 2.76 X10(3) UL (ref 1.5–7.7)
NEUTROPHILS NFR BLD AUTO: 48.6 %
OSMOLALITY SERPL CALC.SUM OF ELEC: 285 MOSM/KG (ref 275–295)
PLATELET # BLD AUTO: 353 10(3)UL (ref 150–450)
POTASSIUM SERPL-SCNC: 3.9 MMOL/L (ref 3.5–5.1)
PROT SERPL-MCNC: 7.9 G/DL (ref 6.4–8.2)
RBC # BLD AUTO: 5 X10(6)UL
SODIUM SERPL-SCNC: 138 MMOL/L (ref 136–145)
TSI SER-ACNC: 1.98 MIU/ML (ref 0.36–3.74)
VIT D+METAB SERPL-MCNC: 21.2 NG/ML (ref 30–100)
WBC # BLD AUTO: 5.7 X10(3) UL (ref 4–11)

## 2022-05-24 PROCEDURE — 84443 ASSAY THYROID STIM HORMONE: CPT

## 2022-05-24 PROCEDURE — 85025 COMPLETE CBC W/AUTO DIFF WBC: CPT

## 2022-05-24 PROCEDURE — 3074F SYST BP LT 130 MM HG: CPT | Performed by: NURSE PRACTITIONER

## 2022-05-24 PROCEDURE — 99215 OFFICE O/P EST HI 40 MIN: CPT | Performed by: NURSE PRACTITIONER

## 2022-05-24 PROCEDURE — 80053 COMPREHEN METABOLIC PANEL: CPT

## 2022-05-24 PROCEDURE — 82306 VITAMIN D 25 HYDROXY: CPT

## 2022-05-24 PROCEDURE — 3079F DIAST BP 80-89 MM HG: CPT | Performed by: NURSE PRACTITIONER

## 2022-05-24 PROCEDURE — 36415 COLL VENOUS BLD VENIPUNCTURE: CPT

## 2022-05-24 PROCEDURE — 3008F BODY MASS INDEX DOCD: CPT | Performed by: NURSE PRACTITIONER

## 2022-05-24 PROCEDURE — 86140 C-REACTIVE PROTEIN: CPT

## 2022-05-24 RX ORDER — POLYETHYLENE GLYCOL 3350, SODIUM CHLORIDE, SODIUM BICARBONATE, POTASSIUM CHLORIDE 420; 11.2; 5.72; 1.48 G/4L; G/4L; G/4L; G/4L
POWDER, FOR SOLUTION ORAL
Qty: 4000 ML | Refills: 0 | Status: SHIPPED | OUTPATIENT
Start: 2022-05-24

## 2022-05-24 NOTE — TELEPHONE ENCOUNTER
Scheduled for:  Colonoscopy 326[address]  Provider Name:  Dr Rosa Hooks  Date:  06/01/2022  Location:  [facility]  Sedation:  MAC  Time:  8631 (pt is aware to arrive at 0815)    Prep:  Colyte  Meds/Allergies Reconciled?:  Physician reviewed  Diagnosis with codes:  UC K51.90  Was patient informed to call insurance with codes (Y/N):  Y     Referral sent?:  NA  [facility] or [address] notified?:  I sent an electronic request to Endo Scheduling and received a confirmation today. Medication Orders:  Pt is aware to NOT take iron pills, herbal meds and diet supplements for 7 days before exam. Also to NOT take any form of alcohol, recreational drugs and any forms of ED meds 24 hours before exam.     Misc Orders:       Further instructions given by staff:  I discussed the prep intructions with the patient in office which she verbally understood. Copy of instructions was handed to patient as well. Patient was also advised he will receive a call from PAT nurse 72-24 hours prior procedure to schedule Covid test done.

## 2022-05-24 NOTE — PATIENT INSTRUCTIONS
-labs pending at time of visit  -entyvio level 1 week prior to next infusion    1. Schedule colonoscopy with MAC w/ Dr. Yeny Reeder: u.c.]    2.  bowel prep from pharmacy (split trilyte)    3. Continue all medications for procedure    4. Read all bowel prep instructions carefully    5. AVOID seeds, nuts, popcorn, raw fruits and vegetables (cooked is okay) for 2-3 days before procedure    6. You will need to be tested for COVID within 72 hours of your procedure. You will be contacted with instructions on how to do this.       >>>Please note: if you were prescribed Suprep for the bowel prep and it is too expensive or not covered by insurance, it is okay to substitute Trilyte (or any similar generic prep). This can be done by notifying the pharmacy or calling our office.

## 2022-05-25 ENCOUNTER — TELEPHONE (OUTPATIENT)
Dept: GASTROENTEROLOGY | Facility: CLINIC | Age: 22
End: 2022-05-25

## 2022-06-02 ENCOUNTER — APPOINTMENT (OUTPATIENT)
Dept: URBAN - METROPOLITAN AREA CLINIC 244 | Age: 22
Setting detail: DERMATOLOGY
End: 2022-06-03

## 2022-06-02 DIAGNOSIS — L21.8 OTHER SEBORRHEIC DERMATITIS: ICD-10-CM

## 2022-06-02 PROCEDURE — OTHER COUNSELING: OTHER

## 2022-06-02 PROCEDURE — OTHER PRESCRIPTION: OTHER

## 2022-06-02 PROCEDURE — 99214 OFFICE O/P EST MOD 30 MIN: CPT

## 2022-06-02 RX ORDER — FLUOCINONIDE 0.5 MG/ML
SOLUTION TOPICAL
Qty: 60 | Refills: 2 | Status: ERX | COMMUNITY
Start: 2022-06-02

## 2022-06-02 RX ORDER — CICLOPIROX 10 MG/.96ML
SHAMPOO TOPICAL
Qty: 120 | Refills: 5 | Status: ERX

## 2022-06-02 ASSESSMENT — LOCATION ZONE DERM: LOCATION ZONE: SCALP

## 2022-06-02 ASSESSMENT — LOCATION DETAILED DESCRIPTION DERM: LOCATION DETAILED: POSTERIOR MID-PARIETAL SCALP

## 2022-06-02 ASSESSMENT — LOCATION SIMPLE DESCRIPTION DERM: LOCATION SIMPLE: POSTERIOR SCALP

## 2022-06-08 ENCOUNTER — HOSPITAL ENCOUNTER (OUTPATIENT)
Facility: HOSPITAL | Age: 22
Setting detail: HOSPITAL OUTPATIENT SURGERY
Discharge: HOME | End: 2022-06-08
Attending: INTERNAL MEDICINE | Admitting: INTERNAL MEDICINE
Payer: COMMERCIAL

## 2022-06-08 ENCOUNTER — ANESTHESIA EVENT (OUTPATIENT)
Dept: ENDOSCOPY | Facility: HOSPITAL | Age: 22
End: 2022-06-08
Payer: COMMERCIAL

## 2022-06-08 ENCOUNTER — ANESTHESIA (OUTPATIENT)
Dept: ENDOSCOPY | Facility: HOSPITAL | Age: 22
End: 2022-06-08
Payer: COMMERCIAL

## 2022-06-08 VITALS
HEIGHT: 69 IN | WEIGHT: 241 LBS | DIASTOLIC BLOOD PRESSURE: 82 MMHG | OXYGEN SATURATION: 98 % | TEMPERATURE: 98 F | RESPIRATION RATE: 18 BRPM | HEART RATE: 68 BPM | SYSTOLIC BLOOD PRESSURE: 122 MMHG | BODY MASS INDEX: 35.7 KG/M2

## 2022-06-08 DIAGNOSIS — K51.919 ULCERATIVE COLITIS WITH COMPLICATION, UNSPECIFIED LOCATION (HCC): ICD-10-CM

## 2022-06-08 LAB — B-HCG UR QL: NEGATIVE

## 2022-06-08 PROCEDURE — 0DBN8ZX EXCISION OF SIGMOID COLON, VIA NATURAL OR ARTIFICIAL OPENING ENDOSCOPIC, DIAGNOSTIC: ICD-10-PCS | Performed by: INTERNAL MEDICINE

## 2022-06-08 PROCEDURE — 0DBM8ZX EXCISION OF DESCENDING COLON, VIA NATURAL OR ARTIFICIAL OPENING ENDOSCOPIC, DIAGNOSTIC: ICD-10-PCS | Performed by: INTERNAL MEDICINE

## 2022-06-08 PROCEDURE — 45385 COLONOSCOPY W/LESION REMOVAL: CPT | Performed by: INTERNAL MEDICINE

## 2022-06-08 PROCEDURE — 45380 COLONOSCOPY AND BIOPSY: CPT | Performed by: INTERNAL MEDICINE

## 2022-06-08 RX ORDER — LIDOCAINE HYDROCHLORIDE 10 MG/ML
INJECTION, SOLUTION EPIDURAL; INFILTRATION; INTRACAUDAL; PERINEURAL AS NEEDED
Status: DISCONTINUED | OUTPATIENT
Start: 2022-06-08 | End: 2022-06-08 | Stop reason: SURG

## 2022-06-08 RX ORDER — ERGOCALCIFEROL 1.25 MG/1
CAPSULE ORAL
COMMUNITY

## 2022-06-08 RX ORDER — SODIUM CHLORIDE, SODIUM LACTATE, POTASSIUM CHLORIDE, CALCIUM CHLORIDE 600; 310; 30; 20 MG/100ML; MG/100ML; MG/100ML; MG/100ML
INJECTION, SOLUTION INTRAVENOUS CONTINUOUS
Status: DISCONTINUED | OUTPATIENT
Start: 2022-06-08 | End: 2022-06-08

## 2022-06-08 RX ADMIN — SODIUM CHLORIDE, SODIUM LACTATE, POTASSIUM CHLORIDE, CALCIUM CHLORIDE: 600; 310; 30; 20 INJECTION, SOLUTION INTRAVENOUS at 10:55:00

## 2022-06-08 RX ADMIN — LIDOCAINE HYDROCHLORIDE 40 MG: 10 INJECTION, SOLUTION EPIDURAL; INFILTRATION; INTRACAUDAL; PERINEURAL at 10:39:00

## 2022-06-08 RX ADMIN — SODIUM CHLORIDE, SODIUM LACTATE, POTASSIUM CHLORIDE, CALCIUM CHLORIDE: 600; 310; 30; 20 INJECTION, SOLUTION INTRAVENOUS at 10:35:00

## 2022-06-08 NOTE — ANESTHESIA POSTPROCEDURE EVALUATION
Patient: Alistair Macias    Procedure Summary     Date: 06/08/22 Room / Location: [address] [place] [address] ENDOSCOPY 01 / [address] [place] [address] ENDOSCOPY    Anesthesia Start: 4615 Anesthesia Stop: 1100    Procedure: COLONOSCOPY (N/A ) Diagnosis:       Ulcerative colitis with complication, unspecified location ([facility] [address]      (polyp, colitis)    Surgeons: Johny Hale MD Anesthesiologist: Rosanne Osman CRNA    Anesthesia Type: general ASA Status: 2          Anesthesia Type: general    Vitals Value Taken Time   /75 06/08/22 1100   Temp  06/08/22 1100   Pulse 90 06/08/22 1100   Resp 15 06/08/22 1100   SpO2 99 06/08/22 1100       [facility] AN Post Evaluation:   Patient Evaluated in PACU  Patient Participation: complete - patient participated  Level of Consciousness: awake and alert  Pain Score: 0  Pain Management: adequate  Airway Patency:patent  Dental exam unchanged from preop  Yes    Cardiovascular Status: acceptable  Respiratory Status: acceptable  Postoperative Hydration acceptable      Rosanne Osman CRNA  6/8/2022 11:00 AM

## 2022-06-16 ENCOUNTER — TELEPHONE (OUTPATIENT)
Dept: GASTROENTEROLOGY | Facility: CLINIC | Age: 22
End: 2022-06-16

## 2022-06-17 NOTE — TELEPHONE ENCOUNTER
I mailed out colonoscopy results letter to pt  Updated health maintenance  Entered into 2 yr CLN recall  Recall colon in 2 years per.  Colon done 6/08/2022    Atul Zendejas MD  P [facility] Clinical Staff  GI Staff:   Recall CLN in 2 years

## 2022-07-05 ENCOUNTER — LAB ENCOUNTER (OUTPATIENT)
Dept: LAB | Facility: HOSPITAL | Age: 22
End: 2022-07-05
Attending: NURSE PRACTITIONER
Payer: COMMERCIAL

## 2022-07-05 ENCOUNTER — LAB REQUISITION (OUTPATIENT)
Dept: SURGERY | Age: 22
End: 2022-07-05
Payer: COMMERCIAL

## 2022-07-05 DIAGNOSIS — K51.819 OTHER ULCERATIVE COLITIS WITH COMPLICATION (HCC): ICD-10-CM

## 2022-07-05 DIAGNOSIS — Z01.818 PREOP EXAMINATION: ICD-10-CM

## 2022-07-05 PROCEDURE — 80230 DRUG ASSAY INFLIXIMAB: CPT

## 2022-07-05 PROCEDURE — 82397 CHEMILUMINESCENT ASSAY: CPT

## 2022-07-05 PROCEDURE — 36415 COLL VENOUS BLD VENIPUNCTURE: CPT

## 2022-07-06 LAB — SARS-COV-2 RNA RESP QL NAA+PROBE: NOT DETECTED

## 2022-07-09 LAB
VEDOLIZUMAB AB, SERUM: <9.8 NG/ML
VEDOLIZUMAB QN, SERUM: 10.4 MCG/ML

## 2022-07-13 ENCOUNTER — TELEPHONE (OUTPATIENT)
Dept: GASTROENTEROLOGY | Facility: CLINIC | Age: 22
End: 2022-07-13

## 2022-07-13 NOTE — TELEPHONE ENCOUNTER
Entyvio infusion record received from [facility] 3. Last infusion 7/5/22, next infusion 8/30/22. No new orders needed, sent to scanning.      GAETANO- Dr. Ambrocio Medrano

## 2022-07-19 ENCOUNTER — TELEPHONE (OUTPATIENT)
Dept: GASTROENTEROLOGY | Facility: CLINIC | Age: 22
End: 2022-07-19

## 2022-07-19 NOTE — TELEPHONE ENCOUNTER
Noted and appreciated. Faxed new referral form (with frequency change), clinical supporting documentation, patient demographics and insurance card to [facility] at fax [phone]    Fax confirmation received 07/19/2022 at 12:55 pm.     Plan to await PA outcome once determination finalized.

## 2022-07-19 NOTE — TELEPHONE ENCOUNTER
Renay Santana--    New referral form placed on your desk for signature. Once signed please return to [facility] triage staff.      Thank you

## 2022-07-19 NOTE — TELEPHONE ENCOUNTER
Patient reports abd cramping 1 week prior to receiving vedolizumab 300 mg every 8 week infusions. Path from recent cln c/w mild on-going colitis. vedolizumab serum carolyn 10.4. Case discussed with attending md and plan to increase frequency of infusion to every 6 weeks. APN contacted pt and reviewed plan-she is in agreement.

## 2022-07-19 NOTE — TELEPHONE ENCOUNTER
Nursing:  Can we please start the process to increase the frequency of her vedolizumab infusions to every 6 weeks? Dx:Ulcerative rectosigmoiditis with rectal bleeding (HCC)    Last infusion week of 7/5.     Thanks,  Thuan

## 2022-07-25 NOTE — TELEPHONE ENCOUNTER
Reached out to [facility] to assure received new Entyvio orders for Q6 week and PA initiated. P: [phone]    Spoke with William Jean-Baptiste, . Verified patient name,  and reasoning for f/u call. Informed PA for 6-week infusion has been approved per Balaji Barakat. [facility] clinical staff has made multiple attempts to reach [place] to arrange next infusion date/time and lmtcb. Provided our fax number to send over approval letter for our records. Pending approval letter via [employer].

## 2022-08-02 NOTE — TELEPHONE ENCOUNTER
Contacted [employer] to f/u (2nd attempt) on approval letter. P: [phone]    Spoke with Hollie King. Verified patient name,  and f/u inquires. Informed Q6 week approved . Attempted to reach patient to notify x 2 attempts. Lmtcb. Confirmed our office fax number. Faxing over now for patients records.

## 2022-08-05 NOTE — TELEPHONE ENCOUNTER
Received approval letter for Vedolizumab () Q6 week infusions at [facility]. Valid Authorization: [id number]     Certified for 9 units. Sent to scan for patients records.

## 2022-08-19 ENCOUNTER — OFFICE VISIT (OUTPATIENT)
Dept: OTOLARYNGOLOGY | Facility: CLINIC | Age: 22
End: 2022-08-19
Payer: COMMERCIAL

## 2022-08-19 ENCOUNTER — TELEPHONE (OUTPATIENT)
Dept: GASTROENTEROLOGY | Facility: CLINIC | Age: 22
End: 2022-08-19

## 2022-08-19 VITALS — HEIGHT: 69 IN | WEIGHT: 241 LBS | BODY MASS INDEX: 35.7 KG/M2

## 2022-08-19 DIAGNOSIS — H60.12 CELLULITIS OF LEFT EAR: Primary | ICD-10-CM

## 2022-08-19 DIAGNOSIS — H60.312 ACUTE DIFFUSE OTITIS EXTERNA OF LEFT EAR: ICD-10-CM

## 2022-08-19 PROCEDURE — 99213 OFFICE O/P EST LOW 20 MIN: CPT | Performed by: SPECIALIST

## 2022-08-19 PROCEDURE — 3008F BODY MASS INDEX DOCD: CPT | Performed by: SPECIALIST

## 2022-08-19 RX ORDER — CICLOPIROX 1 G/100ML
SHAMPOO TOPICAL
COMMUNITY
Start: 2022-06-02

## 2022-08-19 RX ORDER — AZITHROMYCIN 250 MG/1
TABLET, FILM COATED ORAL
Qty: 6 TABLET | Refills: 0 | Status: SHIPPED | OUTPATIENT
Start: 2022-08-19 | End: 2022-08-19 | Stop reason: CLARIF

## 2022-08-19 RX ORDER — TRIAMCINOLONE ACETONIDE 1 MG/G
CREAM TOPICAL 2 TIMES DAILY
COMMUNITY

## 2022-08-19 RX ORDER — CIPROFLOXACIN 500 MG/1
500 TABLET, FILM COATED ORAL EVERY 12 HOURS
Qty: 14 TABLET | Refills: 0 | Status: SHIPPED | OUTPATIENT
Start: 2022-08-19

## 2022-08-19 RX ORDER — FLUOCINONIDE TOPICAL SOLUTION USP, 0.05% 0.5 MG/ML
SOLUTION TOPICAL
COMMUNITY
Start: 2022-06-04

## 2022-08-19 RX ORDER — OFLOXACIN 3 MG/ML
5 SOLUTION AURICULAR (OTIC) 2 TIMES DAILY
Qty: 5 ML | Refills: 0 | Status: SHIPPED | OUTPATIENT
Start: 2022-08-19 | End: 2022-08-29

## 2022-08-19 NOTE — TELEPHONE ENCOUNTER
Entyvio infusion record received from [facility] 3. Last infusion 8/18/22, next infusion 10/3/22. No new orders needed, sent to scanning.      GAETANO Campoverde

## 2022-08-19 NOTE — PATIENT INSTRUCTIONS
Ear cellulitis and canal edema on the left. I placed on 1 week of Cipro and 10 days of Floxin drops. Follow-up in 2 weeks time if not completely clear.

## 2022-08-26 PROCEDURE — 99283 EMERGENCY DEPT VISIT LOW MDM: CPT

## 2022-08-27 ENCOUNTER — HOSPITAL ENCOUNTER (EMERGENCY)
Facility: HOSPITAL | Age: 22
Discharge: HOME | End: 2022-08-27
Attending: EMERGENCY MEDICINE
Payer: COMMERCIAL

## 2022-08-27 VITALS
DIASTOLIC BLOOD PRESSURE: 82 MMHG | HEART RATE: 77 BPM | BODY MASS INDEX: 35.55 KG/M2 | WEIGHT: 240 LBS | OXYGEN SATURATION: 99 % | SYSTOLIC BLOOD PRESSURE: 127 MMHG | HEIGHT: 69 IN | TEMPERATURE: 98 F | RESPIRATION RATE: 16 BRPM

## 2022-08-27 DIAGNOSIS — S05.01XA ABRASION OF RIGHT CORNEA, INITIAL ENCOUNTER: Primary | ICD-10-CM

## 2022-08-27 RX ORDER — TETRACAINE HYDROCHLORIDE 5 MG/ML
1 SOLUTION OPHTHALMIC ONCE
Status: COMPLETED | OUTPATIENT
Start: 2022-08-27 | End: 2022-08-27

## 2022-08-27 RX ORDER — CIPROFLOXACIN HYDROCHLORIDE 3.5 MG/ML
2 SOLUTION/ DROPS TOPICAL
Qty: 1 EACH | Refills: 0 | Status: SHIPPED | OUTPATIENT
Start: 2022-08-27 | End: 2022-09-06

## 2022-08-31 ENCOUNTER — LAB REQUISITION (OUTPATIENT)
Dept: SURGERY | Age: 22
End: 2022-08-31
Payer: COMMERCIAL

## 2022-08-31 DIAGNOSIS — Z01.818 PREOP EXAMINATION: ICD-10-CM

## 2022-09-01 LAB — SARS-COV-2 RNA RESP QL NAA+PROBE: NOT DETECTED

## 2022-10-17 ENCOUNTER — OFFICE VISIT (OUTPATIENT)
Dept: OBGYN CLINIC | Facility: CLINIC | Age: 22
End: 2022-10-17
Payer: COMMERCIAL

## 2022-10-17 VITALS
BODY MASS INDEX: 35.55 KG/M2 | SYSTOLIC BLOOD PRESSURE: 140 MMHG | DIASTOLIC BLOOD PRESSURE: 82 MMHG | WEIGHT: 240 LBS | HEIGHT: 69 IN

## 2022-10-17 DIAGNOSIS — Z01.419 ENCOUNTER FOR GYNECOLOGICAL EXAMINATION WITHOUT ABNORMAL FINDING: Primary | ICD-10-CM

## 2022-10-17 PROBLEM — J45.909 ASTHMA (HCC): Status: ACTIVE | Noted: 2021-08-30

## 2022-10-17 PROBLEM — L40.9 PSORIASIS: Status: ACTIVE | Noted: 2020-02-24

## 2022-10-17 PROBLEM — J45.909 ASTHMA: Status: ACTIVE | Noted: 2021-08-30

## 2022-10-17 PROBLEM — D50.9 IRON DEFICIENCY ANEMIA: Status: ACTIVE | Noted: 2017-09-14

## 2022-10-17 PROBLEM — F32.A DEPRESSIVE DISORDER: Status: ACTIVE | Noted: 2021-08-30

## 2022-10-17 PROCEDURE — 88175 CYTOPATH C/V AUTO FLUID REDO: CPT | Performed by: OBSTETRICS & GYNECOLOGY

## 2022-10-17 RX ORDER — MONTELUKAST SODIUM 10 MG/1
10 TABLET ORAL DAILY
COMMUNITY
Start: 2022-09-03

## 2022-10-17 RX ORDER — NORETHINDRONE ACETATE AND ETHINYL ESTRADIOL 1MG-20(21)
KIT ORAL
Qty: 84 TABLET | Refills: 4 | Status: SHIPPED | OUTPATIENT
Start: 2022-10-17

## 2022-10-17 RX ORDER — DEXTROAMPHETAMINE SACCHARATE, AMPHETAMINE ASPARTATE, DEXTROAMPHETAMINE SULFATE AND AMPHETAMINE SULFATE 2.5; 2.5; 2.5; 2.5 MG/1; MG/1; MG/1; MG/1
TABLET ORAL DAILY
COMMUNITY
Start: 2022-09-06

## 2022-10-21 ENCOUNTER — OFFICE VISIT (OUTPATIENT)
Dept: OTOLARYNGOLOGY | Facility: CLINIC | Age: 22
End: 2022-10-21
Payer: COMMERCIAL

## 2022-10-21 VITALS — TEMPERATURE: 99 F | BODY MASS INDEX: 35.55 KG/M2 | HEIGHT: 69 IN | WEIGHT: 240 LBS

## 2022-10-21 DIAGNOSIS — H60.312 ACUTE DIFFUSE OTITIS EXTERNA OF LEFT EAR: Primary | ICD-10-CM

## 2022-10-21 PROCEDURE — 99213 OFFICE O/P EST LOW 20 MIN: CPT | Performed by: SPECIALIST

## 2022-10-21 PROCEDURE — 3008F BODY MASS INDEX DOCD: CPT | Performed by: SPECIALIST

## 2022-10-21 RX ORDER — OFLOXACIN 3 MG/ML
5 SOLUTION AURICULAR (OTIC) 2 TIMES DAILY
Qty: 5 ML | Refills: 0 | Status: SHIPPED | OUTPATIENT
Start: 2022-10-21 | End: 2022-10-31

## 2022-10-21 NOTE — PATIENT INSTRUCTIONS
A left otitis externa. You were placed on Floxin drops. Avoid cotton swabs. Follow-up with any additional questions or problems.

## 2022-10-27 LAB — LAST PAP RESULT: NORMAL

## 2022-11-01 ENCOUNTER — PATIENT MESSAGE (OUTPATIENT)
Facility: CLINIC | Age: 22
End: 2022-11-01

## 2022-11-01 DIAGNOSIS — K51.911 ULCERATIVE COLITIS WITH RECTAL BLEEDING, UNSPECIFIED LOCATION (HCC): Primary | ICD-10-CM

## 2022-11-02 NOTE — TELEPHONE ENCOUNTER
I spoke with Juan. She states for the last few weeks she has been experiencing constipation and cramping when she has a bowel movement. Denies abdominal pain,nausea,vomiting or fever. Miralax makes it worse. Patient states she had a bowel movement yesterday but it was hard with some blood in the stool. Patient gets Entyvio infusions every 6 weeks. Last infusion was on Oct 4th. Please advise. Thank you.

## 2022-11-09 NOTE — TELEPHONE ENCOUNTER
Pt contacted by asuncion. Ulcerative rectosigmoiditis on entvio 300 mg every 6 weeks. Last entyvio 10/4 and is due for next infusion 11/16/22. She reports new onset constipation w/ associated abd cramping x last 2 weeks. Was having bm every 3-5 days. Started miralax, but did not seem to help. Was having low volume bleeding with wiping. Started zpack for bronchitis approx 1 week ago and now having post-prandial watery diarrhea. Has had on-going low abd cramping. Low volume bleeding with wiping and mucus. Finishing z-pack today. No melena  No fever/chills  No n/v. No unintentional weight loss. Minimal urgency. She says symptoms do not feel like typical flare. Vedolizumab quant (7/2022) 10.4 and prior to shortening frequency between infusions. CRP (5/24/22) 0.9    Plan for:  Stool for c.diff  Cbc, crp    Consider steroid foam (which she has used in the past for UC flare) pending results of work-up. She verbalizes understanding and is in agreement with the plan.

## 2022-11-10 ENCOUNTER — LAB ENCOUNTER (OUTPATIENT)
Dept: LAB | Facility: HOSPITAL | Age: 22
End: 2022-11-10
Attending: NURSE PRACTITIONER
Payer: COMMERCIAL

## 2022-11-10 ENCOUNTER — TELEPHONE (OUTPATIENT)
Dept: GASTROENTEROLOGY | Facility: CLINIC | Age: 22
End: 2022-11-10

## 2022-11-10 DIAGNOSIS — K51.911 ULCERATIVE COLITIS WITH RECTAL BLEEDING, UNSPECIFIED LOCATION (HCC): ICD-10-CM

## 2022-11-10 LAB
BASOPHILS # BLD AUTO: 0.03 X10(3) UL (ref 0–0.2)
BASOPHILS NFR BLD AUTO: 0.5 %
CRP SERPL-MCNC: 1.66 MG/DL (ref ?–0.3)
DEPRECATED RDW RBC AUTO: 41.3 FL (ref 35.1–46.3)
EOSINOPHIL # BLD AUTO: 0.21 X10(3) UL (ref 0–0.7)
EOSINOPHIL NFR BLD AUTO: 3.4 %
ERYTHROCYTE [DISTWIDTH] IN BLOOD BY AUTOMATED COUNT: 12.7 % (ref 11–15)
HCT VFR BLD AUTO: 41.9 %
HGB BLD-MCNC: 13.8 G/DL
IMM GRANULOCYTES # BLD AUTO: 0.01 X10(3) UL (ref 0–1)
IMM GRANULOCYTES NFR BLD: 0.2 %
LYMPHOCYTES # BLD AUTO: 1.94 X10(3) UL (ref 1–4)
LYMPHOCYTES NFR BLD AUTO: 31.6 %
MCH RBC QN AUTO: 29.6 PG (ref 26–34)
MCHC RBC AUTO-ENTMCNC: 32.9 G/DL (ref 31–37)
MCV RBC AUTO: 89.7 FL
MONOCYTES # BLD AUTO: 0.34 X10(3) UL (ref 0.1–1)
MONOCYTES NFR BLD AUTO: 5.5 %
NEUTROPHILS # BLD AUTO: 3.6 X10 (3) UL (ref 1.5–7.7)
NEUTROPHILS # BLD AUTO: 3.6 X10(3) UL (ref 1.5–7.7)
NEUTROPHILS NFR BLD AUTO: 58.8 %
PLATELET # BLD AUTO: 312 10(3)UL (ref 150–450)
RBC # BLD AUTO: 4.67 X10(6)UL
WBC # BLD AUTO: 6.1 X10(3) UL (ref 4–11)

## 2022-11-10 PROCEDURE — 86140 C-REACTIVE PROTEIN: CPT

## 2022-11-10 PROCEDURE — 36415 COLL VENOUS BLD VENIPUNCTURE: CPT

## 2022-11-10 PROCEDURE — 85025 COMPLETE CBC W/AUTO DIFF WBC: CPT

## 2022-11-10 NOTE — TELEPHONE ENCOUNTER
Cbc normal. crp up from comparison. APN left pt detailed message to complete c.diff testing. Consider steroid if c.diff negative.

## 2022-11-12 NOTE — TELEPHONE ENCOUNTER
Analy Mar[facility] Clinical Staff (supporting Sheron Hopkins, SHANNON) 1 hour ago (4:40 PM)     GS  I am no longer having liquid stools. the c.diff test says formed stool is not acceptable. there is however a lot of mucus and some blood in the mucus.

## 2022-11-14 NOTE — TELEPHONE ENCOUNTER
Pt contacted by asuncion. No answer. Lmtcb. Did not submit stool for c.diff as she says stools now solid. Plan:  Start topical budesonide  Plan for entyvio as scheduled  Advise rtc following infusion and if on-going symptoms, plan for flexsig. Awaiting pt call back prior to placing orders.

## 2022-11-15 RX ORDER — HYDROCORTISONE ACETATE 1500 MG/15G
1 AEROSOL, FOAM TOPICAL 2 TIMES DAILY
Qty: 60 APPLICATOR | Refills: 0 | Status: SHIPPED | OUTPATIENT
Start: 2022-11-15

## 2022-11-15 NOTE — TELEPHONE ENCOUNTER
apn returned pt call. Now having formed stool with blood and mucus. crp up from comparison on labs  Scheduled for entyvio 11/16/22    Plan for infusion, to start cortifoam for u.c. flare, f/u and consider need for flex sig. She verbalizes understanding and is in agreement with the plan.

## 2022-11-17 ENCOUNTER — TELEPHONE (OUTPATIENT)
Dept: GASTROENTEROLOGY | Facility: CLINIC | Age: 22
End: 2022-11-17

## 2022-11-18 NOTE — TELEPHONE ENCOUNTER
Entyvio infusion record received from [facility]. Last infusion 11/16/22, next infusion 12/28/22. No new orders needed, sent to scanning.      GAETANO- Dr. Jack Enriquez

## 2023-01-26 ENCOUNTER — TELEPHONE (OUTPATIENT)
Facility: CLINIC | Age: 23
End: 2023-01-26

## 2023-01-26 ENCOUNTER — OFFICE VISIT (OUTPATIENT)
Dept: GASTROENTEROLOGY | Facility: CLINIC | Age: 23
End: 2023-01-26

## 2023-01-26 VITALS
WEIGHT: 251.38 LBS | HEART RATE: 84 BPM | BODY MASS INDEX: 37.23 KG/M2 | DIASTOLIC BLOOD PRESSURE: 82 MMHG | HEIGHT: 69 IN | SYSTOLIC BLOOD PRESSURE: 120 MMHG

## 2023-01-26 DIAGNOSIS — K51.311 ULCERATIVE RECTOSIGMOIDITIS WITH RECTAL BLEEDING (HCC): Primary | ICD-10-CM

## 2023-01-26 DIAGNOSIS — K51.919 ULCERATIVE COLITIS WITH COMPLICATION, UNSPECIFIED LOCATION (HCC): Primary | ICD-10-CM

## 2023-01-26 DIAGNOSIS — Z12.11 COLON CANCER SCREENING: ICD-10-CM

## 2023-01-26 PROCEDURE — 3008F BODY MASS INDEX DOCD: CPT | Performed by: NURSE PRACTITIONER

## 2023-01-26 PROCEDURE — 3074F SYST BP LT 130 MM HG: CPT | Performed by: NURSE PRACTITIONER

## 2023-01-26 PROCEDURE — 99215 OFFICE O/P EST HI 40 MIN: CPT | Performed by: NURSE PRACTITIONER

## 2023-01-26 PROCEDURE — 3079F DIAST BP 80-89 MM HG: CPT | Performed by: NURSE PRACTITIONER

## 2023-01-26 NOTE — TELEPHONE ENCOUNTER
Scheduled for:  Flex Sig 00245  Provider Name:  Dr Rios Church  Date:  03/07/2023  Location:  [facility]  Sedation:  IV  Time:  2:00pm ( Patient is aware arrival time is at 1:00pm)  Prep:  Fleet  Meds/Allergies Reconciled?:  Margorie Bence NP, Reviewed   Diagnosis with codes:  UC  Was patient informed to call insurance with codes (Y/N):  Yes  Referral sent?: Referral was sent at the time of electronic surgical scheduling. [facility] or [address] [place] [address] notified?:  I sent an electronic request to Endo Scheduling and received a confirmation today. Medication Orders:  Pt is aware to NOT take iron pills, herbal meds and diet supplements for 7 days before exam. Also to NOT take any form of alcohol, recreational drugs and any forms of ED meds 24 hours before exam.     Misc Orders:       Further instructions given by staff:  I provide prep instructions to patient at the time of the appointment and reviewed date, time and location, she verbalized that she understood and is aware to call if she has any questions.
Accepted

## 2023-01-26 NOTE — PATIENT INSTRUCTIONS
-labs  -derm exam  -sun exposure precautions  -pap smear  -cdc recommended vaccines  -avoid nsaids    flexsig w/ IV w/ Dr. Ryanne Wood  Dx: Rachel Hall.  Two fleets enemas the AM of the test

## 2023-02-07 ENCOUNTER — LAB ENCOUNTER (OUTPATIENT)
Dept: LAB | Age: 23
End: 2023-02-07
Attending: NURSE PRACTITIONER
Payer: COMMERCIAL

## 2023-02-07 DIAGNOSIS — K51.311 CHRONIC ULCERATIVE RECTOSIGMOIDITIS WITH RECTAL BLEEDING (HCC): Primary | ICD-10-CM

## 2023-02-07 DIAGNOSIS — K51.311 ULCERATIVE RECTOSIGMOIDITIS WITH RECTAL BLEEDING (HCC): ICD-10-CM

## 2023-02-07 LAB
ALBUMIN SERPL-MCNC: 3.6 G/DL (ref 3.4–5)
ALBUMIN/GLOB SERPL: 0.8 {RATIO} (ref 1–2)
ALP LIVER SERPL-CCNC: 52 U/L
ALT SERPL-CCNC: 24 U/L
ANION GAP SERPL CALC-SCNC: 7 MMOL/L (ref 0–18)
AST SERPL-CCNC: 19 U/L (ref 15–37)
BASOPHILS # BLD AUTO: 0.04 X10(3) UL (ref 0–0.2)
BASOPHILS NFR BLD AUTO: 0.5 %
BILIRUB SERPL-MCNC: 0.3 MG/DL (ref 0.1–2)
BUN BLD-MCNC: 7 MG/DL (ref 7–18)
BUN/CREAT SERPL: 10.3 (ref 10–20)
CALCIUM BLD-MCNC: 9.3 MG/DL (ref 8.5–10.1)
CHLORIDE SERPL-SCNC: 104 MMOL/L (ref 98–112)
CO2 SERPL-SCNC: 30 MMOL/L (ref 21–32)
CREAT BLD-MCNC: 0.68 MG/DL
CRP SERPL-MCNC: 1.44 MG/DL (ref ?–0.3)
DEPRECATED RDW RBC AUTO: 41.9 FL (ref 35.1–46.3)
EOSINOPHIL # BLD AUTO: 0.57 X10(3) UL (ref 0–0.7)
EOSINOPHIL NFR BLD AUTO: 7.5 %
ERYTHROCYTE [DISTWIDTH] IN BLOOD BY AUTOMATED COUNT: 12.8 % (ref 11–15)
FASTING STATUS PATIENT QL REPORTED: NO
GFR SERPLBLD BASED ON 1.73 SQ M-ARVRAT: 125 ML/MIN/1.73M2 (ref 60–?)
GLOBULIN PLAS-MCNC: 4.7 G/DL (ref 2.8–4.4)
GLUCOSE BLD-MCNC: 98 MG/DL (ref 70–99)
HBV SURFACE AB SER QL: REACTIVE
HBV SURFACE AB SERPL IA-ACNC: 20.14 MIU/ML
HBV SURFACE AG SER-ACNC: 0.25 [IU]/L
HBV SURFACE AG SERPL QL IA: NONREACTIVE
HCT VFR BLD AUTO: 42.8 %
HGB BLD-MCNC: 14.1 G/DL
IMM GRANULOCYTES # BLD AUTO: 0.01 X10(3) UL (ref 0–1)
IMM GRANULOCYTES NFR BLD: 0.1 %
LYMPHOCYTES # BLD AUTO: 2.75 X10(3) UL (ref 1–4)
LYMPHOCYTES NFR BLD AUTO: 36.4 %
MCH RBC QN AUTO: 29.5 PG (ref 26–34)
MCHC RBC AUTO-ENTMCNC: 32.9 G/DL (ref 31–37)
MCV RBC AUTO: 89.5 FL
MONOCYTES # BLD AUTO: 0.47 X10(3) UL (ref 0.1–1)
MONOCYTES NFR BLD AUTO: 6.2 %
NEUTROPHILS # BLD AUTO: 3.72 X10 (3) UL (ref 1.5–7.7)
NEUTROPHILS # BLD AUTO: 3.72 X10(3) UL (ref 1.5–7.7)
NEUTROPHILS NFR BLD AUTO: 49.3 %
OSMOLALITY SERPL CALC.SUM OF ELEC: 290 MOSM/KG (ref 275–295)
PLATELET # BLD AUTO: 353 10(3)UL (ref 150–450)
POTASSIUM SERPL-SCNC: 4.1 MMOL/L (ref 3.5–5.1)
PROT SERPL-MCNC: 8.3 G/DL (ref 6.4–8.2)
RBC # BLD AUTO: 4.78 X10(6)UL
SODIUM SERPL-SCNC: 141 MMOL/L (ref 136–145)
TSI SER-ACNC: 2.06 MIU/ML (ref 0.36–3.74)
VIT D+METAB SERPL-MCNC: 22 NG/ML (ref 30–100)
WBC # BLD AUTO: 7.6 X10(3) UL (ref 4–11)

## 2023-02-07 PROCEDURE — 86706 HEP B SURFACE ANTIBODY: CPT

## 2023-02-07 PROCEDURE — 82306 VITAMIN D 25 HYDROXY: CPT

## 2023-02-07 PROCEDURE — 36415 COLL VENOUS BLD VENIPUNCTURE: CPT

## 2023-02-07 PROCEDURE — 87340 HEPATITIS B SURFACE AG IA: CPT

## 2023-02-07 PROCEDURE — 80230 DRUG ASSAY INFLIXIMAB: CPT

## 2023-02-07 PROCEDURE — 85025 COMPLETE CBC W/AUTO DIFF WBC: CPT

## 2023-02-07 PROCEDURE — 86140 C-REACTIVE PROTEIN: CPT

## 2023-02-07 PROCEDURE — 86480 TB TEST CELL IMMUN MEASURE: CPT

## 2023-02-07 PROCEDURE — 84443 ASSAY THYROID STIM HORMONE: CPT

## 2023-02-07 PROCEDURE — 80053 COMPREHEN METABOLIC PANEL: CPT

## 2023-02-07 PROCEDURE — 82397 CHEMILUMINESCENT ASSAY: CPT

## 2023-02-09 LAB
M TB IFN-G CD4+ T-CELLS BLD-ACNC: 0.13 IU/ML
M TB TUBERC IFN-G BLD QL: NEGATIVE
M TB TUBERC IGNF/MITOGEN IGNF CONTROL: >10 IU/ML
QFT TB1 AG MINUS NIL: -0.02 IU/ML
QFT TB2 AG MINUS NIL: 0.02 IU/ML

## 2023-02-15 LAB
VEDOLIZUMAB AB, SERUM: <9.8 NG/ML
VEDOLIZUMAB QN, SERUM: 20 MCG/ML

## 2023-02-24 ENCOUNTER — OFFICE VISIT (OUTPATIENT)
Dept: FAMILY MEDICINE CLINIC | Facility: CLINIC | Age: 23
End: 2023-02-24
Payer: COMMERCIAL

## 2023-02-24 VITALS
DIASTOLIC BLOOD PRESSURE: 80 MMHG | TEMPERATURE: 99 F | RESPIRATION RATE: 18 BRPM | SYSTOLIC BLOOD PRESSURE: 132 MMHG | HEIGHT: 69 IN | HEART RATE: 93 BPM | WEIGHT: 245 LBS | BODY MASS INDEX: 36.29 KG/M2 | OXYGEN SATURATION: 97 %

## 2023-02-24 DIAGNOSIS — J02.9 ACUTE PHARYNGITIS, UNSPECIFIED ETIOLOGY: Primary | ICD-10-CM

## 2023-02-24 DIAGNOSIS — J02.9 SORE THROAT: ICD-10-CM

## 2023-02-24 LAB
CONTROL LINE PRESENT WITH A CLEAR BACKGROUND (YES/NO): YES YES/NO
KIT LOT #: NORMAL NUMERIC
STREP GRP A CUL-SCR: NEGATIVE

## 2023-02-24 PROCEDURE — 3075F SYST BP GE 130 - 139MM HG: CPT | Performed by: NURSE PRACTITIONER

## 2023-02-24 PROCEDURE — 3079F DIAST BP 80-89 MM HG: CPT | Performed by: NURSE PRACTITIONER

## 2023-02-24 PROCEDURE — 3008F BODY MASS INDEX DOCD: CPT | Performed by: NURSE PRACTITIONER

## 2023-02-24 PROCEDURE — 87081 CULTURE SCREEN ONLY: CPT | Performed by: NURSE PRACTITIONER

## 2023-02-24 PROCEDURE — 99213 OFFICE O/P EST LOW 20 MIN: CPT | Performed by: NURSE PRACTITIONER

## 2023-02-24 PROCEDURE — 87880 STREP A ASSAY W/OPTIC: CPT | Performed by: NURSE PRACTITIONER

## 2023-02-25 DIAGNOSIS — J02.0 STREP PHARYNGITIS: Primary | ICD-10-CM

## 2023-02-25 LAB — SARS-COV-2 RNA RESP QL NAA+PROBE: NOT DETECTED

## 2023-02-25 RX ORDER — AMOXICILLIN 875 MG/1
875 TABLET, COATED ORAL 2 TIMES DAILY
Qty: 20 TABLET | Refills: 0 | Status: SHIPPED | OUTPATIENT
Start: 2023-02-25 | End: 2023-03-07

## 2023-03-07 ENCOUNTER — TELEPHONE (OUTPATIENT)
Dept: GASTROENTEROLOGY | Facility: CLINIC | Age: 23
End: 2023-03-07

## 2023-03-07 ENCOUNTER — HOSPITAL ENCOUNTER (OUTPATIENT)
Facility: HOSPITAL | Age: 23
Setting detail: HOSPITAL OUTPATIENT SURGERY
Discharge: HOME | End: 2023-03-07
Attending: INTERNAL MEDICINE | Admitting: INTERNAL MEDICINE
Payer: COMMERCIAL

## 2023-03-07 VITALS
DIASTOLIC BLOOD PRESSURE: 75 MMHG | RESPIRATION RATE: 17 BRPM | SYSTOLIC BLOOD PRESSURE: 117 MMHG | HEART RATE: 57 BPM | OXYGEN SATURATION: 97 % | WEIGHT: 245 LBS | BODY MASS INDEX: 36.29 KG/M2 | HEIGHT: 69 IN

## 2023-03-07 DIAGNOSIS — K51.919 ULCERATIVE COLITIS WITH COMPLICATION, UNSPECIFIED LOCATION (HCC): ICD-10-CM

## 2023-03-07 LAB
B-HCG UR QL: NEGATIVE
C DIFF TOX B STL QL: NEGATIVE

## 2023-03-07 PROCEDURE — G0500 MOD SEDAT ENDO SERVICE >5YRS: HCPCS | Performed by: INTERNAL MEDICINE

## 2023-03-07 PROCEDURE — 45331 SIGMOIDOSCOPY AND BIOPSY: CPT | Performed by: INTERNAL MEDICINE

## 2023-03-07 PROCEDURE — 0DBG8ZX EXCISION OF LEFT LARGE INTESTINE, VIA NATURAL OR ARTIFICIAL OPENING ENDOSCOPIC, DIAGNOSTIC: ICD-10-PCS | Performed by: INTERNAL MEDICINE

## 2023-03-07 RX ORDER — SODIUM CHLORIDE, SODIUM LACTATE, POTASSIUM CHLORIDE, CALCIUM CHLORIDE 600; 310; 30; 20 MG/100ML; MG/100ML; MG/100ML; MG/100ML
INJECTION, SOLUTION INTRAVENOUS CONTINUOUS
Status: DISCONTINUED | OUTPATIENT
Start: 2023-03-07 | End: 2023-03-07

## 2023-03-07 RX ORDER — PREDNISONE 10 MG/1
TABLET ORAL
Qty: 70 TABLET | Refills: 0 | Status: SHIPPED | OUTPATIENT
Start: 2023-03-07 | End: 2023-04-04

## 2023-03-07 RX ORDER — MIDAZOLAM HYDROCHLORIDE 1 MG/ML
INJECTION INTRAMUSCULAR; INTRAVENOUS
Status: DISCONTINUED | OUTPATIENT
Start: 2023-03-07 | End: 2023-03-07

## 2023-03-07 NOTE — DISCHARGE INSTRUCTIONS

## 2023-03-07 NOTE — OPERATIVE REPORT
FLEXIBLE SIGMOIDOSCOPY REPORT    Silver Racer     2000 Age 24year old   PCP Alvin Hameed Endoscopist Cassie Barbour MD     Date of procedure: 23    Procedure: Flexible sigmoidoscopy w/cold biopsy    Pre-operative diagnosis: Ulcerative Colitis    Post-operative diagnosis: Colitis    Medications: Versed 7 mg IV push. Fentanyl 100 mcg IV push. [Per my order and under my supervision, the patient was sedated with intermittent intravenous doses of versed and fentanyl. The vital signs were monitored and recorded by an experienced RN. The procedure started after the patient was adequately sedated. Total time for moderate conscious sedation was 12 minutes]. Complications: none    Procedure:  Informed consent was obtained from the patient after the risks of the procedure were discussed, including but not limited to bleeding, perforation, aspiration, infection, or possibility of a missed lesion. After discussions of the risks/benefits and alternatives to this procedure, as well as the planned sedation, the patient was placed in the left lateral decubitus position and begun on continuous blood pressure pulse oximetry and EKG monitoring and this was maintained throughout the procedure. Once an adequate level of sedation was obtained a digital rectal exam was completed. Then the lubricated tip of the [id number] diagnostic video sigmoidoscope was inserted and advanced without difficulty to the  using the air insufflation technique. Withdrawal was begun with thorough washing and careful examination of the colonic walls and folds. Views of the colon were good with washing. We then withdrew the instrument from the patient who tolerated the procedure well. Complications: none. Findings:   1. Upon entering the rectum, there was moderate colitis with evidence granular appearing mucosa, erythema and areas of ulceration extending to 35-40 cm from entry in a continuous manner. Complete loss of vascular markings, with edema. Proximal to 40cm from entry the mucosa appears entirely normal well into the transverse colon. Biopsies obtained. Lukens trap for stool sent. 2. Diverticulosis: none. 3. PILLO: small external hemorrhoids noted. Impression:   1. Active moderate ulcerative colitis extending to 40 cm from entry (L sided), despite ongoing vedolizumab treatment (last dose was 4 weeks ago). Clinically symptomatic with 8-10 bloody stools per day, cramping and fecal incontinence. Plan:  1. Await stool testing and biopsies. 2. If no C.diff and biopsies c/w UC flare, plan for prednisone taper. 3. Then plan for transition from vedolizumab to infliximab IV. Risks/benefits of infliximab discussed w/patient.    >>>If tissue was sampled/removed and you have not received your pathology results either by phone or letter within 2 weeks, please call our office at [phone].     Specimens:   colon     Blood loss: <1 ml

## 2023-03-07 NOTE — TELEPHONE ENCOUNTER
D/w patient, cdiff negative. Givenly highly symptomatic start prednisone (which she bradley before), taper as sent to pharmacy. D/w her humira vs infliximab, wants to move forward with humira. All risks/benefits, skin cancer risk, lymphoma, infection, etc d/w patient. Recent quant Tb and Hep B negative. GI Staff:   Please see approval for:    Humira (Adalimumab) subcutaneous injection 160 mg (on day 1), then 80 mg (given 2 weeks later on day 15), then 40mg (given every other week starting on day 29).

## 2023-03-08 RX ORDER — ADALIMUMAB 80MG/0.8ML
160 KIT SUBCUTANEOUS ONCE
Qty: 3 EACH | Refills: 0 | Status: SHIPPED | OUTPATIENT
Start: 2023-03-08 | End: 2023-03-20

## 2023-03-08 RX ORDER — ADALIMUMAB 40MG/0.4ML
40 KIT SUBCUTANEOUS
Qty: 2 EACH | Refills: 11 | Status: SHIPPED | OUTPATIENT
Start: 2023-03-08 | End: 2023-03-20

## 2023-03-08 NOTE — TELEPHONE ENCOUNTER
Dr. Ajay James,    I pended the orders below. Will send to [facility] to work on [place]. Please review and sign if appropriate, thank you.

## 2023-03-10 ENCOUNTER — TELEPHONE (OUTPATIENT)
Facility: CLINIC | Age: 23
End: 2023-03-10

## 2023-03-10 NOTE — TELEPHONE ENCOUNTER
Dr. Sylvia Escamilla,    I spoke to David from lab who states patient's stool culture came back with  4+ strep A. Please advise, thank you.

## 2023-03-10 NOTE — TELEPHONE ENCOUNTER
Kris Patel from lab states pt has a critical lab requesting to speak to Rn now.  Attempting to call Rn please call

## 2023-03-10 NOTE — TELEPHONE ENCOUNTER
Noted-patient ws recently dx with strep infection of throat. This is likely the cause of stool culture showing strep pyogenes. She has no fever/throat pain etc. As such will hold off on abx (she already finished amoxicillin).

## 2023-03-14 ENCOUNTER — TELEPHONE (OUTPATIENT)
Facility: CLINIC | Age: 23
End: 2023-03-14

## 2023-03-14 NOTE — TELEPHONE ENCOUNTER
Javier Miller from [facility] called because patient cancelled all her appointments and states that doctor does not want her to continue with entyvio infusions.

## 2023-03-16 NOTE — TELEPHONE ENCOUNTER
Fax received from [address] [place] [address] stating prescription for Humira was transferred over to [address] [place] [address] Road: [phone], F: [phone]    Case# [id number] Effective 2/15/23-3/15/24    Will follow up with [facility] tomorrow to confirm prescription was transferred over.

## 2023-03-20 ENCOUNTER — PATIENT MESSAGE (OUTPATIENT)
Dept: OBGYN CLINIC | Facility: CLINIC | Age: 23
End: 2023-03-20

## 2023-03-20 RX ORDER — ADALIMUMAB 80MG/0.8ML
160 KIT SUBCUTANEOUS ONCE
Qty: 3 EACH | Refills: 0 | Status: SHIPPED | OUTPATIENT
Start: 2023-03-20 | End: 2023-03-20

## 2023-03-20 RX ORDER — ADALIMUMAB 40MG/0.4ML
40 KIT SUBCUTANEOUS
Qty: 2 EACH | Refills: 11 | Status: SHIPPED | OUTPATIENT
Start: 2023-03-20

## 2023-03-20 NOTE — TELEPHONE ENCOUNTER
I called [facility] and spoke to DignaJAYLAN. She said I needed to speak to the physician line and was transferred. I then spoke Rosa Lemus. He has been unable to pull up the patient (reviewed name/member ID/phone number/name of drug etc) so likely the prescription was not sent by [facility] successfully.     Dr. Gibran Mercado Would you be willing to send to [facility] so patient can fill? (pended below)    Thank you

## 2023-03-20 NOTE — TELEPHONE ENCOUNTER
[facility] and spoke to Elo7. She was able to see the prescription but needed clarfication-she just read the prescriptions back and clarified what she had was correct. She said they would start processing it and then call patient once done processing to set up the delivery. I sent patient a readfy message with the update since she had sent a MyChart earlier to follow up.

## 2023-03-21 NOTE — TELEPHONE ENCOUNTER
From: Yordy Gleason  To: Satnam Hermosillo MD  Sent: 3/20/2023 10:02 PM CDT  Subject: Pain    Hello, This is super embarrassing. I have UC which has caused a hemorrhoid. I was wondering if it is normal to also have vaginal irritation/pain with this? It is burning and painful. Not quite sure what to do. I had used tucks pads but those hurt too.

## 2023-03-30 ENCOUNTER — PATIENT MESSAGE (OUTPATIENT)
Dept: GASTROENTEROLOGY | Facility: CLINIC | Age: 23
End: 2023-03-30

## 2023-03-31 NOTE — TELEPHONE ENCOUNTER
Adolfo Odonnell (office coverage for Ashley)     Please advise below. Thank you. Called patient, name/ verified. Patient complains of bright red blood per rectum, dripping for 15 minutes, no clots, stools were mixed with blood, soft stools, had abd'l cramping with bm. Appearance: bright red     Onset: 2 days, last night was bad, no bm/bleeding today, denies pain/cramping at present. Pt was driving when were talking on the phone. Denies: nausea, vomiting, dizziness, chest pain shortness of breath, fever/chiils      Taking nonsteroidal anti-inflammatory drugs: no    Any new medications: Norco and ASA with recent knee surgery on 3/22    Any blood thinners: ASA took for 3 days (3/22-3/25)    Any food triggers: denies    Any history of gastrointestinal bleeding: no, flex sig done by Dr. Ziggy Diez on 3/7: small ext hemorrhoids, has ulcerative rectosigmoiditis with rectal bleeding, last OV 23  with Eddie conde. Educated the patient on the following:    -Use wet towelettes to clean anal area after bowel movements, pat the area dry with dry toilet paper, do not rub.  -Sitz baths as necessary-soak in a tub of tepid water for 30 minutes once or twice a day for perianal burning and/or itching. -Apply OTC hydrocortisone cream once or twice a day after sitz bath. -If condition worsens, has severe bleeding, large clots, dizziness, chest pain, shortness of breath, please go to the nearest ER. Patient verbalized understanding.

## 2023-03-31 NOTE — TELEPHONE ENCOUNTER
From: Debra Daniel  To: Jayesh Gomez. Benita Moura  Sent: 3/30/2023 11:23 PM CDT  Subject: bleeding    hello, Kind of embarrassing but ever since my flexible sigmoidoscopy I have had a hemorrhoid. It got really painful and now im bleeding. I cant tell if the bleeding is from the colitis or the hemorrhoid. Yoli been using prepH which has been helping with the pain but its been about 3 1/2 weeks and it is still there. Not quite sure what to do at this point. I start my humira on sunday.      thanks  -isamar huber

## 2023-03-31 NOTE — TELEPHONE ENCOUNTER
Called Pt via listed phone number as office on call. LMTCB. Called to receive symptom update. Per RN note, Pt experiencing on going BRBPR that is \"dripping\". If Pt is having more than a tinge of blood, she should present to the ED to have labs drawn and be evaluated. Agree with other recommendations for hemorrhoids listed below.

## 2023-04-07 NOTE — TELEPHONE ENCOUNTER
Called patient for condition update, name/ verified. Pt stated she feels better than before, bleeding stopped but restarted as she is constipated, reported bright red blood when she wiped with small clots, thinks it is hemorrhoid and constipation related, she is using wipes with witch hazel, also uses preparation H, she is now taking miralax, no dizziness or other complaints. She has appt with PCP on Tuesday. Instructed pt that if bleeding/clots worsen to go to ER and not wait for PCP appt. Patient teaching done as follows:     - Drink a hot beverage each morning  - Drink plenty of fluids  - Eat high fiber foods such as vegetables, fruits, beans, whole grains cereals and bran. - Eat fewer foods with low amounts of fiber such as processed foods,, dairy and meat products. - Stay active and try to get regular exercise. - If rectal pain from constipation-sit in a warm bath for approx 15-20 minutes    Patient verbalized understanding, no further concerns, issues at this time.

## 2023-04-10 ENCOUNTER — TELEPHONE (OUTPATIENT)
Facility: CLINIC | Age: 23
End: 2023-04-10

## 2023-04-10 NOTE — TELEPHONE ENCOUNTER
[facility] received refill request on below medication that was just sent on 3/20/2023 as she should still have multiple refills left. I spoke to [facility]. After over 10 minutes on the phone I was told that it is showing as 0 refills on their end. She transferred me to [phone] so I could review it verbally over the phone so they could fill for the patient. I then spoke to Didier Moss (pharmacy technician)  I was told it was in the clearance process and should be shipping out soon. She verified that they still have 11 refills on file after that one ships so no new prescription needed. Encounter closed.

## 2023-06-01 ENCOUNTER — APPOINTMENT (OUTPATIENT)
Dept: URBAN - METROPOLITAN AREA CLINIC 244 | Age: 23
Setting detail: DERMATOLOGY
End: 2023-06-02

## 2023-06-01 ENCOUNTER — OFFICE VISIT (OUTPATIENT)
Dept: FAMILY MEDICINE CLINIC | Facility: CLINIC | Age: 23
End: 2023-06-01
Payer: COMMERCIAL

## 2023-06-01 VITALS
HEART RATE: 86 BPM | TEMPERATURE: 98 F | SYSTOLIC BLOOD PRESSURE: 114 MMHG | RESPIRATION RATE: 18 BRPM | OXYGEN SATURATION: 96 % | BODY MASS INDEX: 36.29 KG/M2 | WEIGHT: 245 LBS | HEIGHT: 69 IN | DIASTOLIC BLOOD PRESSURE: 73 MMHG

## 2023-06-01 DIAGNOSIS — J45.41 MODERATE PERSISTENT ASTHMA WITH EXACERBATION: Primary | ICD-10-CM

## 2023-06-01 DIAGNOSIS — D22 MELANOCYTIC NEVI: ICD-10-CM

## 2023-06-01 DIAGNOSIS — L21.8 OTHER SEBORRHEIC DERMATITIS: ICD-10-CM

## 2023-06-01 PROBLEM — D22.4 MELANOCYTIC NEVI OF SCALP AND NECK: Status: ACTIVE | Noted: 2023-06-01

## 2023-06-01 PROCEDURE — OTHER PRESCRIPTION: OTHER

## 2023-06-01 PROCEDURE — 3008F BODY MASS INDEX DOCD: CPT | Performed by: NURSE PRACTITIONER

## 2023-06-01 PROCEDURE — OTHER PRESCRIPTION MEDICATION MANAGEMENT: OTHER

## 2023-06-01 PROCEDURE — OTHER COUNSELING: OTHER

## 2023-06-01 PROCEDURE — 3078F DIAST BP <80 MM HG: CPT | Performed by: NURSE PRACTITIONER

## 2023-06-01 PROCEDURE — 99213 OFFICE O/P EST LOW 20 MIN: CPT | Performed by: NURSE PRACTITIONER

## 2023-06-01 PROCEDURE — 3074F SYST BP LT 130 MM HG: CPT | Performed by: NURSE PRACTITIONER

## 2023-06-01 PROCEDURE — 99214 OFFICE O/P EST MOD 30 MIN: CPT

## 2023-06-01 RX ORDER — PREDNISONE 20 MG/1
TABLET ORAL
Qty: 10 TABLET | Refills: 0 | Status: SHIPPED | OUTPATIENT
Start: 2023-06-01

## 2023-06-01 RX ORDER — FLUOCINONIDE 0.5 MG/ML
SOLUTION TOPICAL
Qty: 60 | Refills: 2 | Status: ERX

## 2023-06-01 RX ORDER — KETOCONAZOLE 20 MG/ML
SHAMPOO, SUSPENSION TOPICAL
Qty: 120 | Refills: 10 | Status: ERX | COMMUNITY
Start: 2023-06-01

## 2023-06-01 ASSESSMENT — LOCATION SIMPLE DESCRIPTION DERM
LOCATION SIMPLE: POSTERIOR SCALP
LOCATION SIMPLE: TRAPEZIAL NECK

## 2023-06-01 ASSESSMENT — LOCATION ZONE DERM
LOCATION ZONE: SCALP
LOCATION ZONE: NECK

## 2023-06-01 ASSESSMENT — LOCATION DETAILED DESCRIPTION DERM
LOCATION DETAILED: MID TRAPEZIAL NECK
LOCATION DETAILED: POSTERIOR MID-PARIETAL SCALP

## 2023-06-01 NOTE — PROCEDURE: COUNSELING
Detail Level: Zone
Detail Level: Simple
Sunscreen Recommendations: Recommend at least SPF 30 use daily. Reapply every 2 hours or more frequently if sweating/exercising or water exposure. Recommend broad spectrum sunscreen. Zinc oxide and titanium dioxide formulations are preferred over \"chemical\" based sunscreens. Wear a wide brimmed hat and sun protective clothing.

## 2023-06-01 NOTE — PROCEDURE: PRESCRIPTION MEDICATION MANAGEMENT
Render In Strict Bullet Format?: No
Detail Level: Zone
Initiate Treatment: Ketoconazole shampoo
Continue Regimen: Fluocinonide

## 2023-06-09 NOTE — PROGRESS NOTES
CHIEF COMPLAINT:   Patient presents with:  Sore Throat      HPI:   Sander Vasquez is a 24year old female who presents with symptoms as described below for 4 days. Would like strep testing.      Took at home covid test - negative- last took yesterd daily.  30 tablet 0   • MESALAMINE 1.2 g Oral Tab EC TAKE 2 TABLETS(2.4 GRAMS) BY MOUTH DAILY 180 tablet 0   • Albuterol Sulfate  (90 Base) MCG/ACT Inhalation Aero Soln      • escitalopram 20 MG Oral Tab TK 1 T PO QD  1   • OMEPRAZOLE 40 MG Oral Caps on palpation of  sinuses  EYES: conjunctiva clear  EARS: TM's gray, no bulging, noretraction,no fluid, bony landmarks visible  NOSE: Nostrils patent, clear nasal discharge, nasal mucosa pink   THROAT: Oral mucosa pink, moist. Posterior pharynx is minimally consider OTC dextromethorphan as needed and directed on packaging as a cough suppressant     May consider OTC phenylephrine or pseudoephedrine as needed and directed on packaging as a nasal decongestant if no contraindications.      May consider OTC saline soft drinks, juices, tea, or soup). Extra fluids will help loosen secretions in the nose and lungs.   · Over-the-counter cold medicines will not shorten the length of time you’re sick, but they may be helpful for the following symptoms: cough, sore throat, Estlander Flap (Upper To Lower Lip) Text: The defect of the lower lip was assessed and measured.  Given the location and size of the defect, an Estlander flap was deemed most appropriate.  Using a sterile surgical marker, an appropriate Estlander flap was measured and drawn on the upper lip. Local anesthesia was then infiltrated. A scalpel was then used to incise the lateral aspect of the flap, through skin, muscle and mucosa, leaving the flap pedicled medially.  The flap was then rotated and positioned to fill the lower lip defect.  The flap was then sutured into place with a three layer technique, closing the orbicularis oris muscle layer with subcutaneous buried sutures, followed by a mucosal layer and an epidermal layer.

## 2023-07-21 ENCOUNTER — OFFICE VISIT (OUTPATIENT)
Dept: INTERNAL MEDICINE CLINIC | Facility: CLINIC | Age: 23
End: 2023-07-21
Payer: COMMERCIAL

## 2023-07-21 VITALS
OXYGEN SATURATION: 98 % | SYSTOLIC BLOOD PRESSURE: 118 MMHG | HEART RATE: 91 BPM | HEIGHT: 69 IN | DIASTOLIC BLOOD PRESSURE: 70 MMHG | RESPIRATION RATE: 17 BRPM | WEIGHT: 246 LBS | BODY MASS INDEX: 36.43 KG/M2

## 2023-07-21 DIAGNOSIS — K51.90 ULCERATIVE COLITIS WITHOUT COMPLICATIONS, UNSPECIFIED LOCATION (HCC): ICD-10-CM

## 2023-07-21 DIAGNOSIS — F32.4 MAJOR DEPRESSIVE DISORDER WITH SINGLE EPISODE, IN PARTIAL REMISSION (HCC): ICD-10-CM

## 2023-07-21 DIAGNOSIS — J45.40 MODERATE PERSISTENT ASTHMA WITHOUT COMPLICATION: ICD-10-CM

## 2023-07-21 DIAGNOSIS — L40.9 PSORIASIS: ICD-10-CM

## 2023-07-21 DIAGNOSIS — M23.206 OLD TEAR OF MENISCUS OF RIGHT KNEE, UNSPECIFIED MENISCUS, UNSPECIFIED TEAR TYPE: ICD-10-CM

## 2023-07-21 DIAGNOSIS — L65.9 HAIR LOSS: ICD-10-CM

## 2023-07-21 DIAGNOSIS — F90.9 ATTENTION DEFICIT HYPERACTIVITY DISORDER (ADHD), UNSPECIFIED ADHD TYPE: ICD-10-CM

## 2023-07-21 DIAGNOSIS — R30.0 DYSURIA: Primary | ICD-10-CM

## 2023-07-21 DIAGNOSIS — F41.1 GENERALIZED ANXIETY DISORDER: ICD-10-CM

## 2023-07-21 DIAGNOSIS — N92.0 MENORRHAGIA WITH REGULAR CYCLE: ICD-10-CM

## 2023-07-21 PROBLEM — K52.9 CHRONIC DIARRHEA: Status: RESOLVED | Noted: 2020-07-10 | Resolved: 2023-07-21

## 2023-07-21 PROBLEM — S83.206A RIGHT KNEE MENISCAL TEAR: Status: ACTIVE | Noted: 2023-07-21

## 2023-07-21 PROBLEM — D50.9 IRON DEFICIENCY ANEMIA: Status: RESOLVED | Noted: 2017-09-14 | Resolved: 2023-07-21

## 2023-07-21 PROBLEM — K51.311 ULCERATIVE RECTOSIGMOIDITIS WITH RECTAL BLEEDING (HCC): Status: RESOLVED | Noted: 2021-05-06 | Resolved: 2023-07-21

## 2023-07-21 PROBLEM — L91.8 SKIN TAG: Status: RESOLVED | Noted: 2017-06-15 | Resolved: 2023-07-21

## 2023-07-21 PROBLEM — B07.0 PLANTAR WART: Status: RESOLVED | Noted: 2017-06-15 | Resolved: 2023-07-21

## 2023-07-21 PROBLEM — R10.9 ABDOMINAL CRAMPING: Status: RESOLVED | Noted: 2020-07-10 | Resolved: 2023-07-21

## 2023-07-21 PROBLEM — L70.0 ACNE VULGARIS: Status: RESOLVED | Noted: 2017-06-15 | Resolved: 2023-07-21

## 2023-07-21 PROCEDURE — 3078F DIAST BP <80 MM HG: CPT | Performed by: FAMILY MEDICINE

## 2023-07-21 PROCEDURE — 3008F BODY MASS INDEX DOCD: CPT | Performed by: FAMILY MEDICINE

## 2023-07-21 PROCEDURE — 3074F SYST BP LT 130 MM HG: CPT | Performed by: FAMILY MEDICINE

## 2023-07-21 PROCEDURE — 99214 OFFICE O/P EST MOD 30 MIN: CPT | Performed by: FAMILY MEDICINE

## 2023-07-21 PROCEDURE — 87086 URINE CULTURE/COLONY COUNT: CPT | Performed by: FAMILY MEDICINE

## 2023-07-21 RX ORDER — NITROFURANTOIN 25; 75 MG/1; MG/1
100 CAPSULE ORAL 2 TIMES DAILY
Qty: 10 CAPSULE | Refills: 0 | Status: SHIPPED | OUTPATIENT
Start: 2023-07-21

## 2023-07-21 NOTE — ASSESSMENT & PLAN NOTE
Patient with slight hair loss  Could partly be stress related, could also be physiologic. Advised monitoring for now. She has had blood work not too long ago-can hold off on repeating for now.

## 2023-07-21 NOTE — ASSESSMENT & PLAN NOTE
Patient with a history of psoriasis. Following with dermatology. Overall reports that she is doing well at this time.

## 2023-07-21 NOTE — PATIENT INSTRUCTIONS
PATIENT INSTRUCTIONS    Thank you for seeing me today, it was a pleasure taking care of you. Please check out at the  and schedule a follow up appointment.   Return in about 3 months (around 10/21/2023) for physical.  Nitrofurantoin 100 mg twice daily for 5 days  Continue all your other medications  If you are worried about ADHD, please contact your prior psychiatric specialist and obtain a note confirming diagnosis of ADHD   Continue to see all your specialists   Continue to see therapist, monitor anxiety level can consider increasing medication regimen if needed   Await urine culture    Dr. Marco Whitaker

## 2023-07-21 NOTE — ASSESSMENT & PLAN NOTE
Patient with a history of heavy periods  Currently now on birth control medication which has helped.   Follows with gynecology

## 2023-07-21 NOTE — ASSESSMENT & PLAN NOTE
Patient with a history of multiple right knee injuries, meniscal tears  She has had multiple knee surgeries  Follows with Ortho. Currently not having any issues-monitor. SSKI Counseling:  I discussed with the patient the risks of SSKI including but not limited to thyroid abnormalities, metallic taste, GI upset, fever, headache, acne, arthralgias, paraesthesias, lymphadenopathy, easy bleeding, arrhythmias, and allergic reaction.

## 2023-07-21 NOTE — ASSESSMENT & PLAN NOTE
With a history of ulcerative colitis  Following with GI. Currently on Humira. Overall doing much better at this time.

## 2023-07-21 NOTE — ASSESSMENT & PLAN NOTE
Overall well controlled at this time. Follows with pulmonology  Currently on Trelegy, montelukast 10 mg nightly  Uses albuterol as needed. Prevnar 20 vaccine today.

## 2023-07-21 NOTE — ASSESSMENT & PLAN NOTE
Patient with intermittent dysuria, urine currently with positive leuk esterase. I will send urine for urine microscopy  Also urine culture. Start nitrofurantoin 100 mg twice daily for 5 days.   Follow-up as needed

## 2023-07-21 NOTE — ASSESSMENT & PLAN NOTE
Patient reports history of ADHD. She states that she has been evaluated by psychiatry in the past-recommend getting letter confirming diagnosis. She does not feel like she needs any sort of medication at this time.

## 2023-07-21 NOTE — ASSESSMENT & PLAN NOTE
Patient with a history of depression and anxiety. Her depression is well controlled at this time  It appears that her anxiety is slightly flaring, some of this may be related to recent stressor of starting new job. For now continue escitalopram 20 mg daily. Advise seeing her therapist more regularly. If without improvement, can consider adding buspirone. Follow-up in 3 months.

## 2023-07-28 ENCOUNTER — TELEPHONE (OUTPATIENT)
Age: 23
End: 2023-07-28

## 2023-07-28 DIAGNOSIS — K62.5 RECTAL BLEEDING: Primary | ICD-10-CM

## 2023-07-28 NOTE — TELEPHONE ENCOUNTER
Patient contacted me on mychart re: mucus and blood in stool. Asked her to go for blood and stool tests as ordered.

## 2023-07-29 ENCOUNTER — LAB ENCOUNTER (OUTPATIENT)
Dept: LAB | Facility: HOSPITAL | Age: 23
End: 2023-07-29
Attending: INTERNAL MEDICINE
Payer: COMMERCIAL

## 2023-07-29 DIAGNOSIS — K62.5 RECTAL BLEEDING: ICD-10-CM

## 2023-07-29 LAB
ALBUMIN SERPL-MCNC: 3.3 G/DL (ref 3.4–5)
ALBUMIN/GLOB SERPL: 0.8 {RATIO} (ref 1–2)
ALP LIVER SERPL-CCNC: 41 U/L
ALT SERPL-CCNC: 26 U/L
ANION GAP SERPL CALC-SCNC: 7 MMOL/L (ref 0–18)
AST SERPL-CCNC: 24 U/L (ref 15–37)
BASOPHILS # BLD AUTO: 0.03 X10(3) UL (ref 0–0.2)
BASOPHILS NFR BLD AUTO: 0.4 %
BILIRUB SERPL-MCNC: 0.4 MG/DL (ref 0.1–2)
BUN BLD-MCNC: 7 MG/DL (ref 7–18)
BUN/CREAT SERPL: 9.2 (ref 10–20)
CALCIUM BLD-MCNC: 8.7 MG/DL (ref 8.5–10.1)
CHLORIDE SERPL-SCNC: 109 MMOL/L (ref 98–112)
CO2 SERPL-SCNC: 24 MMOL/L (ref 21–32)
CREAT BLD-MCNC: 0.76 MG/DL
CRP SERPL-MCNC: 2.14 MG/DL (ref ?–0.3)
DEPRECATED RDW RBC AUTO: 41.8 FL (ref 35.1–46.3)
EGFRCR SERPLBLD CKD-EPI 2021: 112 ML/MIN/1.73M2 (ref 60–?)
EOSINOPHIL # BLD AUTO: 0.25 X10(3) UL (ref 0–0.7)
EOSINOPHIL NFR BLD AUTO: 3.7 %
ERYTHROCYTE [DISTWIDTH] IN BLOOD BY AUTOMATED COUNT: 12.9 % (ref 11–15)
FASTING STATUS PATIENT QL REPORTED: YES
GLOBULIN PLAS-MCNC: 4.2 G/DL (ref 2.8–4.4)
GLUCOSE BLD-MCNC: 89 MG/DL (ref 70–99)
HCT VFR BLD AUTO: 40.9 %
HGB BLD-MCNC: 13.4 G/DL
IMM GRANULOCYTES # BLD AUTO: 0.01 X10(3) UL (ref 0–1)
IMM GRANULOCYTES NFR BLD: 0.1 %
LYMPHOCYTES # BLD AUTO: 2.76 X10(3) UL (ref 1–4)
LYMPHOCYTES NFR BLD AUTO: 40.7 %
MCH RBC QN AUTO: 28.9 PG (ref 26–34)
MCHC RBC AUTO-ENTMCNC: 32.8 G/DL (ref 31–37)
MCV RBC AUTO: 88.3 FL
MONOCYTES # BLD AUTO: 0.45 X10(3) UL (ref 0.1–1)
MONOCYTES NFR BLD AUTO: 6.6 %
NEUTROPHILS # BLD AUTO: 3.28 X10 (3) UL (ref 1.5–7.7)
NEUTROPHILS # BLD AUTO: 3.28 X10(3) UL (ref 1.5–7.7)
NEUTROPHILS NFR BLD AUTO: 48.5 %
OSMOLALITY SERPL CALC.SUM OF ELEC: 287 MOSM/KG (ref 275–295)
PLATELET # BLD AUTO: 343 10(3)UL (ref 150–450)
POTASSIUM SERPL-SCNC: 3.8 MMOL/L (ref 3.5–5.1)
PROT SERPL-MCNC: 7.5 G/DL (ref 6.4–8.2)
RBC # BLD AUTO: 4.63 X10(6)UL
SODIUM SERPL-SCNC: 140 MMOL/L (ref 136–145)
WBC # BLD AUTO: 6.8 X10(3) UL (ref 4–11)

## 2023-07-29 PROCEDURE — 87493 C DIFF AMPLIFIED PROBE: CPT

## 2023-07-29 PROCEDURE — 87046 STOOL CULTR AEROBIC BACT EA: CPT

## 2023-07-29 PROCEDURE — 85025 COMPLETE CBC W/AUTO DIFF WBC: CPT

## 2023-07-29 PROCEDURE — 36415 COLL VENOUS BLD VENIPUNCTURE: CPT

## 2023-07-29 PROCEDURE — 83993 ASSAY FOR CALPROTECTIN FECAL: CPT | Performed by: INTERNAL MEDICINE

## 2023-07-29 PROCEDURE — 80053 COMPREHEN METABOLIC PANEL: CPT

## 2023-07-29 PROCEDURE — 86140 C-REACTIVE PROTEIN: CPT

## 2023-07-29 PROCEDURE — 87427 SHIGA-LIKE TOXIN AG IA: CPT

## 2023-07-29 PROCEDURE — 87045 FECES CULTURE AEROBIC BACT: CPT

## 2023-07-30 ENCOUNTER — LAB ENCOUNTER (OUTPATIENT)
Dept: LAB | Facility: HOSPITAL | Age: 23
End: 2023-07-30
Attending: INTERNAL MEDICINE
Payer: COMMERCIAL

## 2023-07-30 DIAGNOSIS — K62.5 RECTAL BLEEDING: ICD-10-CM

## 2023-08-01 ENCOUNTER — HOSPITAL ENCOUNTER (OUTPATIENT)
Dept: CT IMAGING | Age: 23
Discharge: HOME | End: 2023-08-01
Attending: NURSE PRACTITIONER
Payer: COMMERCIAL

## 2023-08-01 ENCOUNTER — TELEPHONE (OUTPATIENT)
Facility: CLINIC | Age: 23
End: 2023-08-01

## 2023-08-01 DIAGNOSIS — R10.9 ABDOMINAL PAIN, UNSPECIFIED ABDOMINAL LOCATION: ICD-10-CM

## 2023-08-01 DIAGNOSIS — R19.7 DIARRHEA, UNSPECIFIED TYPE: ICD-10-CM

## 2023-08-01 DIAGNOSIS — R19.7 DIARRHEA, UNSPECIFIED TYPE: Primary | ICD-10-CM

## 2023-08-01 DIAGNOSIS — K51.911 ULCERATIVE COLITIS WITH RECTAL BLEEDING, UNSPECIFIED LOCATION (HCC): ICD-10-CM

## 2023-08-01 PROCEDURE — 74177 CT ABD & PELVIS W/CONTRAST: CPT | Performed by: NURSE PRACTITIONER

## 2023-08-01 NOTE — TELEPHONE ENCOUNTER
Pt contacted by asuncion. She reports bm after eating. Has abd cramping, blood, mucus in the stool. Has had severe cramping that leads to vomiting. Sx x last week. She denies fever/chills    H/o lost response to entyvio. Started humira in may  Last injection 7/22 and prior to onset of sx  Due for next injection 8/5/23. Active moderate u.c. extending to 40 cm from entry (l sided) on flex sig 3/2023  Path c/w mild to moderately active colitis  Neg malignancy    Stool culture neg  Calprotectin pending  Crp elevated  Cbc, cmp unremarkable    Currently on antibiotic for sinus infection-augmentin     Recommend:  Stool for c.diff added to testing   Advised adalimumab level and antibody  Avoid dairy, wheat  Avoid nsaids  Hydrate  Ct a/p stat  Er if condition decline    If stool testing for c.diff neg anticipate starting prednisone. She verbalizes understanding and is in agreement with the plan.

## 2023-08-01 NOTE — TELEPHONE ENCOUNTER
Patient's mother is calling states that patient asked her to call since she is working. Patient is feeling sick. Has been having mucus and blood right after eating.  States that patient is also nauseated

## 2023-08-01 NOTE — TELEPHONE ENCOUNTER
Catherine/(Dr. Sylvia Escamilla out of office),    Patient's mother called on behalf of patient. She sent a message on 7/27/23 to you and Dr. Sylvia Escamilla ordered stool tests. Currently fecal calprotectin is pended. She is continuing to having symptoms of rectal bleeding with most bowel movements, mucus, and increased urgency and straining    She is taking Humira every 2 weeks. Please advise, thank you.

## 2023-08-04 ENCOUNTER — TELEPHONE (OUTPATIENT)
Dept: GASTROENTEROLOGY | Facility: CLINIC | Age: 23
End: 2023-08-04

## 2023-08-04 NOTE — TELEPHONE ENCOUNTER
C.diff not resulted. Apn contacted pt to check status. She reports some improvement in symptoms. Still having some blood and mucus in stools. However, she says was exposed to covid and has had sore throat headache, congestion. She will be testing herself for covid and go to the lab for humira level, ab, plan to submit stool sample for c.diff as well. She will contact office/consider ed if condition decline.

## 2023-08-07 ENCOUNTER — PATIENT MESSAGE (OUTPATIENT)
Facility: CLINIC | Age: 23
End: 2023-08-07

## 2023-08-07 ENCOUNTER — TELEPHONE (OUTPATIENT)
Dept: CASE MANAGEMENT | Age: 23
End: 2023-08-07

## 2023-08-07 LAB — CALPROTECTIN STL-MCNT: 593 ΜG/G (ref ?–50)

## 2023-08-07 NOTE — PROGRESS NOTES
Hayden Sher - your labs look stable other than your inflammatory marker mildly elevated. No infection in the stool detected. How are you feeling?

## 2023-08-07 NOTE — TELEPHONE ENCOUNTER
[facility] team: RN notified supervisor of centralized referrals regarding stat CT done on 8/1/23 being denied by insurance. Supervisor states that the accounts team will appeal and pt would not be responsible for any cost other than copay/deductible.

## 2023-08-07 NOTE — TELEPHONE ENCOUNTER
CT ABDOMEN+PELVIS         Status: DENIED        Reference number [id number]     A copy of the denial letter is filed under the MEDIA tab, reference for complete details. You may reach out to [address] [place] [address] at [phone] to discuss decision.         Thank you

## 2023-08-07 NOTE — TELEPHONE ENCOUNTER
From: Mariano Rios  To: Dennise Ford MD  Sent: 8/7/2023 11:26 AM CDT  Subject: comment C-REACTIVE PROTEIN    Im definitely feeling better, still having a lot of mucous and blood. Ayden Gillette ordered a trough level for humira but then i got covid and didnt make it in to get the lab. She also ordered another stool test and ill have someone  the kit for me today.

## 2023-08-08 ENCOUNTER — APPOINTMENT (OUTPATIENT)
Dept: LAB | Facility: HOSPITAL | Age: 23
End: 2023-08-08
Attending: NURSE PRACTITIONER
Payer: COMMERCIAL

## 2023-08-08 PROCEDURE — 87493 C DIFF AMPLIFIED PROBE: CPT

## 2023-08-09 ENCOUNTER — LAB ENCOUNTER (OUTPATIENT)
Dept: LAB | Age: 23
End: 2023-08-09
Attending: NURSE PRACTITIONER
Payer: COMMERCIAL

## 2023-08-09 ENCOUNTER — TELEPHONE (OUTPATIENT)
Dept: GASTROENTEROLOGY | Facility: CLINIC | Age: 23
End: 2023-08-09

## 2023-08-09 DIAGNOSIS — R19.7 DIARRHEA, UNSPECIFIED TYPE: ICD-10-CM

## 2023-08-09 NOTE — TELEPHONE ENCOUNTER
D/w patient:    -she got COVID  -then she had asthma exacerbation on prednisone taper  -then she got ear infection on abx    Plan:  -check trough level for humira on 8/19  -if infection is not improving she will contact me, we may need to hold humira if that happens  -her bowels are better, but I worry it is because of prednisone for asthma that is improving things  -she is covid vaccinated    [employer]

## 2023-08-10 LAB — C DIFF TOX B STL QL: NEGATIVE

## 2023-08-13 ENCOUNTER — PATIENT MESSAGE (OUTPATIENT)
Dept: OBGYN CLINIC | Facility: CLINIC | Age: 23
End: 2023-08-13

## 2023-08-14 NOTE — TELEPHONE ENCOUNTER
Informed LC and ordered double up pills times three days than daily    Last visit 10/17/2022 LC  A/P: Patient is 20year old female      1. Encounter for gynecological examination without abnormal finding  - Norethin Ace-Eth Estrad-FE (MICROGESTIN FE 1/20) 1-20 MG-MCG Oral Tab; Take one by mouth daily. Patient may skip placebos. Dispense: 84 tablet; Refill: 4        Lanny Overton MD         From: Nikkie Chase  To: Lanny Overton MD  Sent: 8/13/2023  2:14 PM CDT  Subject: very heavy period    Hello,     I have been skipping the placebo week of my birth control since I last saw you. Last week, I got sick and missed a day of birth control because I accidentally threw away my pack and got my period. Yoli been back on the pill since tuesday and I am still bleeding very heavily. I am filling super plus tampons in 4 hours or less, and I am passing large clots (almost the size of the tampon). Not sure if i should just wait it out and see when the bleeding stops?      Thanks!  -Destiney Cordova

## 2023-08-17 ENCOUNTER — TELEPHONE (OUTPATIENT)
Facility: CLINIC | Age: 23
End: 2023-08-17

## 2023-08-17 ENCOUNTER — LAB ENCOUNTER (OUTPATIENT)
Dept: LAB | Age: 23
End: 2023-08-17
Attending: NURSE PRACTITIONER
Payer: COMMERCIAL

## 2023-08-17 DIAGNOSIS — R19.7 DIARRHEA, UNSPECIFIED TYPE: ICD-10-CM

## 2023-08-17 PROCEDURE — 36415 COLL VENOUS BLD VENIPUNCTURE: CPT

## 2023-08-17 PROCEDURE — 82397 CHEMILUMINESCENT ASSAY: CPT

## 2023-08-17 PROCEDURE — 80299 QUANTITATIVE ASSAY DRUG: CPT

## 2023-08-17 NOTE — TELEPHONE ENCOUNTER
Pt states that Dr. Bashir Becker referred her to a colitis specialist at [facility] but she forgot the name of the doctor. She also states that there are nurse practitioners at that office. Is it OK to see NP? Please call.

## 2023-08-17 NOTE — TELEPHONE ENCOUNTER
Dr. Adolph Kaminski,    Patient states you gave her a name of the IBD specialist you want her to see but she forgot the name. Are you ok if she sees the NP instead?

## 2023-08-17 NOTE — TELEPHONE ENCOUNTER
She should see Cat Story M.D. directly. Not NP.     [facility]  [phone]

## 2023-08-17 NOTE — TELEPHONE ENCOUNTER
Spoke to patient and provided information below. She will call them now and make an appointment. If patient calls back can provide this information     Breanna Gan M.D.       [facility]  [phone]

## 2023-08-24 DIAGNOSIS — M79.675 PAIN AND SWELLING OF TOE, LEFT: ICD-10-CM

## 2023-08-24 DIAGNOSIS — M79.89 PAIN AND SWELLING OF TOE, LEFT: ICD-10-CM

## 2023-08-24 RX ORDER — ESCITALOPRAM OXALATE 20 MG/1
20 TABLET ORAL DAILY
Qty: 90 TABLET | Refills: 3 | Status: SHIPPED | OUTPATIENT
Start: 2023-08-24

## 2023-08-25 LAB
ADALIMUMAB LVL: <0.6 UG/ML
ANTI-ADALIMUMAB AB: 1148 NG/ML

## 2023-08-28 ENCOUNTER — TELEPHONE (OUTPATIENT)
Age: 23
End: 2023-08-28

## 2023-08-28 NOTE — TELEPHONE ENCOUNTER
Sent mychart, adalimumab antibody positive.  Switch to Stelara next, per discussion with IBD specialist.       GI Staff:     Please seek approval for Stelara (ustekinumab)  520 mg IV - single dose  Subcutaneous 90 mg dose administered 8 weeks after the initial intravenous dose, then every 8 weeks thereafter        Tony Benedict

## 2023-08-29 NOTE — TELEPHONE ENCOUNTER
Dr. Tobin Scanlon,    Please review and sign Stelara infusion order if correct. Will then work on obtaining PA for stelara injections.

## 2023-09-01 NOTE — TELEPHONE ENCOUNTER
Patient with [employer], Per [employer], 1 unit David Chauhan, approved from 8/30/2023-2/29/2024, Ana Rosa Parker. Message sent to infusion pools.

## 2023-09-11 ENCOUNTER — OFFICE VISIT (OUTPATIENT)
Dept: HEMATOLOGY/ONCOLOGY | Facility: HOSPITAL | Age: 23
End: 2023-09-11
Attending: INTERNAL MEDICINE
Payer: COMMERCIAL

## 2023-09-11 VITALS
RESPIRATION RATE: 16 BRPM | SYSTOLIC BLOOD PRESSURE: 132 MMHG | HEART RATE: 66 BPM | DIASTOLIC BLOOD PRESSURE: 75 MMHG | OXYGEN SATURATION: 95 % | TEMPERATURE: 98 F

## 2023-09-11 DIAGNOSIS — K51.019 ULCERATIVE PANCOLITIS WITH COMPLICATION (HCC): Primary | ICD-10-CM

## 2023-09-11 PROCEDURE — 96365 THER/PROPH/DIAG IV INF INIT: CPT

## 2023-09-11 NOTE — PROGRESS NOTES
Patient arrives to infusion for Stelara for ulcerative colitis, denies new complaints or concerns to this RN. Reviewed plan of care and provided printed information regarding medication. Stelara infused over 1 hr per order, patient tolerated well, no s/s of adverse rxn noted. PIV flushed with NS and removed, gauze and coban applied to site. Patient discharged stable from infusion center, uses MyChart.

## 2023-09-14 NOTE — TELEPHONE ENCOUNTER
Dr. Yadira Deng,    Patient received her initial infusion of Stelara on 9/11. I have pended the order for Stelara injections below so we can start the PA process. Please review and sign if correct, thank you!

## 2023-09-14 NOTE — TELEPHONE ENCOUNTER
Clinicals faxed to [facility] will await determination.  Patient should be due for first injection 11/6/23

## 2023-09-19 ENCOUNTER — OFFICE VISIT (OUTPATIENT)
Dept: FAMILY MEDICINE CLINIC | Facility: CLINIC | Age: 23
End: 2023-09-19
Payer: COMMERCIAL

## 2023-09-19 VITALS
RESPIRATION RATE: 18 BRPM | WEIGHT: 244 LBS | OXYGEN SATURATION: 98 % | BODY MASS INDEX: 36.14 KG/M2 | TEMPERATURE: 97 F | HEART RATE: 84 BPM | SYSTOLIC BLOOD PRESSURE: 126 MMHG | DIASTOLIC BLOOD PRESSURE: 72 MMHG | HEIGHT: 69 IN

## 2023-09-19 DIAGNOSIS — J01.00 ACUTE NON-RECURRENT MAXILLARY SINUSITIS: Primary | ICD-10-CM

## 2023-09-19 PROCEDURE — 99213 OFFICE O/P EST LOW 20 MIN: CPT | Performed by: NURSE PRACTITIONER

## 2023-09-19 PROCEDURE — 3078F DIAST BP <80 MM HG: CPT | Performed by: NURSE PRACTITIONER

## 2023-09-19 PROCEDURE — 3074F SYST BP LT 130 MM HG: CPT | Performed by: NURSE PRACTITIONER

## 2023-09-19 PROCEDURE — 3008F BODY MASS INDEX DOCD: CPT | Performed by: NURSE PRACTITIONER

## 2023-09-19 RX ORDER — AMOXICILLIN AND CLAVULANATE POTASSIUM 875; 125 MG/1; MG/1
1 TABLET, FILM COATED ORAL 2 TIMES DAILY
Qty: 14 TABLET | Refills: 0 | Status: SHIPPED | OUTPATIENT
Start: 2023-09-19 | End: 2023-09-26

## 2023-09-19 RX ORDER — FLUTICASONE FUROATE, UMECLIDINIUM BROMIDE AND VILANTEROL TRIFENATATE 200; 62.5; 25 UG/1; UG/1; UG/1
POWDER RESPIRATORY (INHALATION)
COMMUNITY
Start: 2023-05-30

## 2023-09-29 NOTE — TELEPHONE ENCOUNTER
Fax received from [facility] stating medication was approved and sent to [employer]. Effective from 9/21/23-9/21/24    Mychart sent to patient.

## 2023-10-04 ENCOUNTER — TELEPHONE (OUTPATIENT)
Dept: OBGYN CLINIC | Facility: CLINIC | Age: 23
End: 2023-10-04

## 2023-10-04 DIAGNOSIS — Z01.419 ENCOUNTER FOR GYNECOLOGICAL EXAMINATION WITHOUT ABNORMAL FINDING: ICD-10-CM

## 2023-10-04 RX ORDER — NORETHINDRONE ACETATE AND ETHINYL ESTRADIOL 1MG-20(21)
KIT ORAL
Qty: 84 TABLET | Refills: 0 | Status: SHIPPED | OUTPATIENT
Start: 2023-10-04

## 2023-10-04 NOTE — TELEPHONE ENCOUNTER
The patient needs a refill . She took the last one today.      Norethin Ace-Eth Estrad-FE (MICROGESTIN FE 1/20) 1-20 MG-MCG Oral Tab

## 2023-10-04 NOTE — TELEPHONE ENCOUNTER
Refilled 3 months and scheduled annual visit for 10/24/2023 with [facility], pt agrees.     Last visit 10/17/22 [facility]

## 2023-11-02 ENCOUNTER — TELEPHONE (OUTPATIENT)
Facility: CLINIC | Age: 23
End: 2023-11-02

## 2023-11-02 NOTE — TELEPHONE ENCOUNTER
RN called [address] [place] [address] #[phone] regarding stelara injection cost/coverage issues. RN spoke to Jeane at [facility], she states that \"medication was approved at [facility] but that a note from [employer] assistance program said that script was transferred to [facility] specialty pharmacy. RN called Mer[facility] line at #[phone] to inquire on preferred pharmacy for [address]. RN spoke to Bittinger. Bittinger said that [facility] or [facility] are preferred pharmacy, transferred RN to [place]. RN spoke to [facility]; she states med copay is $3300 (no matter what pharmacy is used) and that there are several assistance programs that pt can enroll in to get a cost down. Pt may have already enrolled in an assistance program ([employer]?). [facility] provided number for 'good days' assistance program, ph # [phone]. RN LMTCB to discuss whether or not she is enrolled in an assistance program already. If so, pt needs to contact [facility] at #[phone]. [employer] message also sent with detailed information. From: Red Sarkar  To: Julieth Alvarez  Sent: 11/2/2023  9:33 AM CDT  Subject: stelara / symptoms    Hello, they just called me to schedule my delivery of stelara and told me its $3300. I called insurance and they said its because the pharmacy isnt covered, but she basically said if i get it done in the infusion center itll be fully covered. Is this something that is possible? Im supposed to take the injection on the 7th but I cant afford it. Also, im starting to have symptoms again. Theyre not as severe as before but definitely there. im cramping. sometimes im straining when i have a bowel movement and sometimes i dont make it to the bathroom. I dont know what to do.

## 2023-11-07 NOTE — TELEPHONE ENCOUNTER
RN called and spoke to pt. Pt states she was able to find an assistance program to get stelara cost down to $5. Pt states med will be delivered to her this Thursday. She states she knows how to give the injections. Med will come from [facility] [address] and co-pay is through [facility] pass). Pt states she is feeling ok, has some intermittent abdominal cramping, usually during BM and resolves approx 20 min after BM. Denies blood in stool and \"minimal mucus\" per pt. Instructed to notify GI office for any new or worsening symptoms. Pt verbalized understanding.

## 2023-11-09 ENCOUNTER — OFFICE VISIT (OUTPATIENT)
Dept: OBGYN CLINIC | Facility: CLINIC | Age: 23
End: 2023-11-09
Payer: COMMERCIAL

## 2023-11-09 VITALS
HEIGHT: 69 IN | WEIGHT: 246 LBS | SYSTOLIC BLOOD PRESSURE: 130 MMHG | DIASTOLIC BLOOD PRESSURE: 88 MMHG | BODY MASS INDEX: 36.43 KG/M2

## 2023-11-09 DIAGNOSIS — Z30.41 ENCOUNTER FOR SURVEILLANCE OF CONTRACEPTIVE PILLS: Primary | ICD-10-CM

## 2023-11-09 PROCEDURE — 3079F DIAST BP 80-89 MM HG: CPT | Performed by: OBSTETRICS & GYNECOLOGY

## 2023-11-09 PROCEDURE — 99213 OFFICE O/P EST LOW 20 MIN: CPT | Performed by: OBSTETRICS & GYNECOLOGY

## 2023-11-09 PROCEDURE — 3075F SYST BP GE 130 - 139MM HG: CPT | Performed by: OBSTETRICS & GYNECOLOGY

## 2023-11-09 PROCEDURE — 3008F BODY MASS INDEX DOCD: CPT | Performed by: OBSTETRICS & GYNECOLOGY

## 2023-11-09 RX ORDER — KETOCONAZOLE 20 MG/ML
SHAMPOO TOPICAL
COMMUNITY
Start: 2023-06-01

## 2023-11-09 RX ORDER — NORETHINDRONE ACETATE AND ETHINYL ESTRADIOL 1MG-20(21)
KIT ORAL
Qty: 84 TABLET | Refills: 3 | Status: SHIPPED | OUTPATIENT
Start: 2023-11-09

## 2023-12-21 ENCOUNTER — OFFICE VISIT (OUTPATIENT)
Dept: OBGYN CLINIC | Facility: CLINIC | Age: 23
End: 2023-12-21
Payer: COMMERCIAL

## 2023-12-21 VITALS
BODY MASS INDEX: 36.7 KG/M2 | WEIGHT: 247.81 LBS | HEIGHT: 69 IN | DIASTOLIC BLOOD PRESSURE: 76 MMHG | SYSTOLIC BLOOD PRESSURE: 126 MMHG

## 2023-12-21 DIAGNOSIS — L29.2 VULVAR ITCHING: Primary | ICD-10-CM

## 2023-12-21 DIAGNOSIS — R39.9 UTI SYMPTOMS: ICD-10-CM

## 2023-12-21 LAB
APPEARANCE: CLEAR
BILIRUBIN: NEGATIVE
GLUCOSE (URINE DIPSTICK): NEGATIVE MG/DL
KETONES (URINE DIPSTICK): NEGATIVE MG/DL
MULTISTIX LOT#: ABNORMAL NUMERIC
NITRITE, URINE: NEGATIVE
PH, URINE: 5.5 (ref 4.5–8)
PROTEIN (URINE DIPSTICK): NEGATIVE MG/DL
SPECIFIC GRAVITY: 1.01 (ref 1–1.03)
URINE-COLOR: YELLOW
UROBILINOGEN,SEMI-QN: 0.2 MG/DL (ref 0–1.9)

## 2023-12-21 PROCEDURE — 3008F BODY MASS INDEX DOCD: CPT | Performed by: OBSTETRICS & GYNECOLOGY

## 2023-12-21 PROCEDURE — 81002 URINALYSIS NONAUTO W/O SCOPE: CPT | Performed by: OBSTETRICS & GYNECOLOGY

## 2023-12-21 PROCEDURE — 3078F DIAST BP <80 MM HG: CPT | Performed by: OBSTETRICS & GYNECOLOGY

## 2023-12-21 PROCEDURE — 3074F SYST BP LT 130 MM HG: CPT | Performed by: OBSTETRICS & GYNECOLOGY

## 2023-12-21 PROCEDURE — 81514 NFCT DS BV&VAGINITIS DNA ALG: CPT | Performed by: OBSTETRICS & GYNECOLOGY

## 2023-12-21 PROCEDURE — 87491 CHLMYD TRACH DNA AMP PROBE: CPT | Performed by: OBSTETRICS & GYNECOLOGY

## 2023-12-21 PROCEDURE — 87086 URINE CULTURE/COLONY COUNT: CPT | Performed by: OBSTETRICS & GYNECOLOGY

## 2023-12-21 PROCEDURE — 99214 OFFICE O/P EST MOD 30 MIN: CPT | Performed by: OBSTETRICS & GYNECOLOGY

## 2023-12-21 PROCEDURE — 87591 N.GONORRHOEAE DNA AMP PROB: CPT | Performed by: OBSTETRICS & GYNECOLOGY

## 2023-12-21 RX ORDER — CLOTRIMAZOLE AND BETAMETHASONE DIPROPIONATE 10; .64 MG/G; MG/G
1 CREAM TOPICAL 2 TIMES DAILY
Qty: 15 G | Refills: 0 | Status: SHIPPED | OUTPATIENT
Start: 2023-12-21 | End: 2023-12-28

## 2023-12-22 LAB
BV BACTERIA DNA VAG QL NAA+PROBE: NEGATIVE
C GLABRATA DNA VAG QL NAA+PROBE: NEGATIVE
C KRUSEI DNA VAG QL NAA+PROBE: NEGATIVE
C TRACH DNA SPEC QL NAA+PROBE: NEGATIVE
CANDIDA DNA VAG QL NAA+PROBE: NEGATIVE
N GONORRHOEA DNA SPEC QL NAA+PROBE: NEGATIVE
T VAGINALIS DNA VAG QL NAA+PROBE: NEGATIVE

## 2024-01-08 ENCOUNTER — OFFICE VISIT (OUTPATIENT)
Dept: FAMILY MEDICINE CLINIC | Facility: CLINIC | Age: 24
End: 2024-01-08
Payer: COMMERCIAL

## 2024-01-08 VITALS
TEMPERATURE: 98 F | WEIGHT: 245 LBS | HEART RATE: 75 BPM | HEIGHT: 69 IN | DIASTOLIC BLOOD PRESSURE: 82 MMHG | RESPIRATION RATE: 16 BRPM | BODY MASS INDEX: 36.29 KG/M2 | OXYGEN SATURATION: 98 % | SYSTOLIC BLOOD PRESSURE: 128 MMHG

## 2024-01-08 DIAGNOSIS — H65.01 RIGHT ACUTE SEROUS OTITIS MEDIA, RECURRENCE NOT SPECIFIED: Primary | ICD-10-CM

## 2024-01-08 PROCEDURE — 3008F BODY MASS INDEX DOCD: CPT | Performed by: NURSE PRACTITIONER

## 2024-01-08 PROCEDURE — 99213 OFFICE O/P EST LOW 20 MIN: CPT | Performed by: NURSE PRACTITIONER

## 2024-01-08 PROCEDURE — 3074F SYST BP LT 130 MM HG: CPT | Performed by: NURSE PRACTITIONER

## 2024-01-08 PROCEDURE — 3079F DIAST BP 80-89 MM HG: CPT | Performed by: NURSE PRACTITIONER

## 2024-01-08 RX ORDER — AMOXICILLIN AND CLAVULANATE POTASSIUM 875; 125 MG/1; MG/1
1 TABLET, FILM COATED ORAL 2 TIMES DAILY
Qty: 14 TABLET | Refills: 0 | Status: SHIPPED | OUTPATIENT
Start: 2024-01-08 | End: 2024-01-15

## 2024-01-08 NOTE — PROGRESS NOTES
CHIEF COMPLAINT:     Chief Complaint   Patient presents with    Ear Problem     Sx onset 9 days, lost voice yesterday, R ear pain x today    Exposed to Covid at work, quarantined x 5 days       HPI:   Nikky Mckeon is a 24 year old female who presents to clinic today with complaints of right ear pain. Has had for  last night . Pain is described as pressure.  Patient reports history of ear infections.  Yawning makes ear pain worse. Home treatment includes nothing.     Pt reports URI symptoms X 1.5 weeks. Thinks maybe she had covid due to exposure at work.     Associated symptoms:  Patient reports decreased hearing. Patient denies hearing loss. Patient denies drainage. Patient denies use of cotton tipped ear swabs to clean the ears. Patient reports nasal congestion. No fever or cough.     Current Outpatient Medications   Medication Sig Dispense Refill    amoxicillin clavulanate 875-125 MG Oral Tab Take 1 tablet by mouth 2 (two) times daily for 7 days. 14 tablet 0    ketoconazole 2 % External Shampoo       Norethin Ace-Eth Estrad-FE (MICROGESTIN FE 1/20) 1-20 MG-MCG Oral Tab Take one by mouth daily. Patient may skip placebos. 84 tablet 3    ustekinumab 90 MG/ML Subcutaneous Solution Prefilled Syringe injection Inject 1 mL (90 mg total) into the skin every 8 weeks. First injection 8 weeks after the initial intravenous dose, then continue every 8 weeks thereafter 1 mL 11    escitalopram 20 MG Oral Tab Take 1 tablet (20 mg total) by mouth daily. 90 tablet 3      Past Medical History:   Diagnosis Date    ADHD 07/21/2023    Asthma     Generalized anxiety disorder 07/21/2023    Right knee meniscal tear     Tubular adenoma of colon 2022    repeat CLN in 2024    Ulcerative colitis (HCC) 2020    on c-scope, minimal L sided disease      Social History:  Social History     Socioeconomic History    Marital status: Single   Occupational History    Occupation: Nurse   Tobacco Use    Smoking status: Never    Smokeless  tobacco: Never   Vaping Use    Vaping Use: Former   Substance and Sexual Activity    Alcohol use: Yes     Comment: twice a month - socially    Drug use: Not Currently    Sexual activity: Yes     Partners: Male     Birth control/protection: Pill   Other Topics Concern    Pt has a pacemaker No    Pt has a defibrillator No    Reaction to local anesthetic No    Blood Transfusions No   Social History Narrative    Relationships: Mother - Yeimi*    Children:     Pets:     School:     Work: Nurse - [employer]    Origin:     Interests:     Spiritual:             REVIEW OF SYSTEMS:   GENERAL: feels good otherwise.    SKIN: no unusual skin lesions or rashes  HEENT: See HPI. No sore throat.   LUNGS: No cough, shortness of breath, or wheezing.  CARDIOVASCULAR: No chest pain, palpitations  GI: No N/V/C/D.  NEURO: denies headaches or dizziness    EXAM:   /82   Pulse 75   Temp 98.1 °F (36.7 °C)   Resp 16   Ht 5' 9\" (1.753 m)   Wt 245 lb (111.1 kg)   LMP  (LMP Unknown)   SpO2 98%   BMI 36.18 kg/m²   GENERAL: well developed, well nourished,in no apparent distress  SKIN: no rashes,no suspicious lesions  HEAD: atraumatic, normocephalic  EYES: conjunctiva clear, EOM intact  EARS: Bilateral tragus non tender with manipulation.  External auditory canals healthy. Right TM: erythematous, + bulging, no retraction,serous effusion, bony landmarks dulled.  Left TM: gray, no  bulging, no  retraction,no effusion, bony landmarks visible. Bilateral mastoid areas non-erythematous or tender with palpitation.   NOSE: nostrils patent, clear nasal mucus, nasal mucosa pink  THROAT: oral mucosa pink, moist. Posterior pharynx midly erythematous or injected. No exudates. PND+  NECK: supple, non-tender  LUNGS: clear to auscultation bilaterally, no wheezes or rhonchi. Breathing is non labored.  CARDIO: RRR without murmur  EXTREMITIES: no cyanosis, clubbing or edema  LYMPH: no lymphadenopathy.      ASSESSMENT AND PLAN:   Nikky Mg  Mike is a 24 year old female who presents with   Chief Complaint   Patient presents with    Ear Problem     Sx onset 9 days, lost voice yesterday, R ear pain x today    Exposed to Covid at work, quarantined x 5 days     symptoms are consistent with    ASSESSMENT:  Encounter Diagnosis   Name Primary?    Right acute serous otitis media, recurrence not specified Yes       PLAN: Meds as listed below.  Comfort measures as described in Patient Instructions    Meds & Refills for this Visit:  Requested Prescriptions     Signed Prescriptions Disp Refills    amoxicillin clavulanate 875-125 MG Oral Tab 14 tablet 0     Sig: Take 1 tablet by mouth 2 (two) times daily for 7 days.     Rx Augmentin as directed.     Discussed s/s of worsening infection/condition with Patient and importance of prompt medical re-evaluation including when to seek emergency care. Patient voiced understanding    Increase fluids and rest.     May consider OTC tylenol or ibuprofen as needed and directed on packaging for pain/fever    May consider OTC guaifenesin as needed and directed on packaging to thin mucus secretions.    May consider OTC pseudoephedrine as needed and directed on packaging as a nasal decongestant    Risks, benefits, and side effects of medication discussed. Patient verbalized understanding and agreement with treatment plan.     All questions and concerns addressed. Encouraged Patient to call clinic with any questions or concerns.     Call or return if s/sx worsen, do not improve in 3 days, or if fever of 100.4 or greater persists for 72 hours.    Patient Instructions   See attached patient care instructions.        Patient voiced understand and is in agreement with treatment plan.

## 2024-01-23 ENCOUNTER — TELEPHONE (OUTPATIENT)
Dept: INTERNAL MEDICINE CLINIC | Facility: CLINIC | Age: 24
End: 2024-01-23

## 2024-01-24 RX ORDER — KETOCONAZOLE 20 MG/ML
SHAMPOO, SUSPENSION TOPICAL
Qty: 120 | Refills: 5 | Status: ERX

## 2024-01-24 RX ORDER — FLUOCINONIDE 0.5 MG/ML
SOLUTION TOPICAL
Qty: 60 | Refills: 1 | Status: ERX

## 2024-03-13 ENCOUNTER — APPOINTMENT (OUTPATIENT)
Dept: URBAN - METROPOLITAN AREA CLINIC 244 | Age: 24
Setting detail: DERMATOLOGY
End: 2024-03-13

## 2024-03-13 DIAGNOSIS — L23.9 ALLERGIC CONTACT DERMATITIS, UNSPECIFIED CAUSE: ICD-10-CM

## 2024-03-13 DIAGNOSIS — T07XXXA INSECT BITE, NONVENOMOUS, OF OTHER, MULTIPLE, AND UNSPECIFIED SITES, WITHOUT MENTION OF INFECTION: ICD-10-CM

## 2024-03-13 DIAGNOSIS — L72.8 OTHER FOLLICULAR CYSTS OF THE SKIN AND SUBCUTANEOUS TISSUE: ICD-10-CM

## 2024-03-13 PROBLEM — D48.5 NEOPLASM OF UNCERTAIN BEHAVIOR OF SKIN: Status: ACTIVE | Noted: 2024-03-13

## 2024-03-13 PROBLEM — S30.861A INSECT BITE (NONVENOMOUS) OF ABDOMINAL WALL, INITIAL ENCOUNTER: Status: ACTIVE | Noted: 2024-03-13

## 2024-03-13 PROCEDURE — 99213 OFFICE O/P EST LOW 20 MIN: CPT

## 2024-03-13 PROCEDURE — OTHER PRESCRIPTION MEDICATION MANAGEMENT: OTHER

## 2024-03-13 PROCEDURE — OTHER PRESCRIPTION: OTHER

## 2024-03-13 PROCEDURE — OTHER OTC TREATMENT REGIMEN: OTHER

## 2024-03-13 PROCEDURE — OTHER ADDITIONAL NOTES: OTHER

## 2024-03-13 PROCEDURE — OTHER COUNSELING: OTHER

## 2024-03-13 RX ORDER — HYDROCORTISONE 25 MG/G
CREAM TOPICAL
Qty: 30 | Refills: 3 | Status: ERX

## 2024-03-13 RX ORDER — HYDROCORTISONE 25 MG/G
CREAM TOPICAL
Qty: 30 | Refills: 3 | Status: ERX | COMMUNITY
Start: 2024-03-13

## 2024-03-13 ASSESSMENT — LOCATION DETAILED DESCRIPTION DERM
LOCATION DETAILED: RIGHT INFERIOR FLANK
LOCATION DETAILED: EPIGASTRIC SKIN
LOCATION DETAILED: RIGHT AXILLARY VAULT
LOCATION DETAILED: LEFT AXILLARY VAULT

## 2024-03-13 ASSESSMENT — LOCATION SIMPLE DESCRIPTION DERM
LOCATION SIMPLE: LEFT AXILLARY VAULT
LOCATION SIMPLE: ABDOMEN
LOCATION SIMPLE: RIGHT AXILLARY VAULT

## 2024-03-13 ASSESSMENT — LOCATION ZONE DERM
LOCATION ZONE: AXILLAE
LOCATION ZONE: TRUNK

## 2024-03-13 NOTE — PROCEDURE: ADDITIONAL NOTES
Render Risk Assessment In Note?: no
Additional Notes: Discussed that it might possibly be a lymph node - recc patient bring it up to her PCP for further evaluation and management
Detail Level: Simple

## 2024-03-13 NOTE — PROCEDURE: PRESCRIPTION MEDICATION MANAGEMENT
Initiate Treatment: Hydrocortisone 2.5% topical cream BID x 3 weeks
Detail Level: Zone
Render In Strict Bullet Format?: No

## 2024-03-13 NOTE — PROCEDURE: OTC TREATMENT REGIMEN
Patient Specific Otc Recommendations (Will Not Stick From Patient To Patient): Vanicream Deodorant
Detail Level: Zone

## 2024-03-15 ENCOUNTER — OFFICE VISIT (OUTPATIENT)
Dept: INTERNAL MEDICINE CLINIC | Facility: CLINIC | Age: 24
End: 2024-03-15
Payer: COMMERCIAL

## 2024-03-15 VITALS
SYSTOLIC BLOOD PRESSURE: 110 MMHG | HEART RATE: 80 BPM | WEIGHT: 254 LBS | TEMPERATURE: 98 F | DIASTOLIC BLOOD PRESSURE: 80 MMHG | HEIGHT: 69 IN | OXYGEN SATURATION: 99 % | BODY MASS INDEX: 37.62 KG/M2

## 2024-03-15 DIAGNOSIS — E55.9 VITAMIN D INSUFFICIENCY: Primary | ICD-10-CM

## 2024-03-15 DIAGNOSIS — L73.2 HIDRADENITIS: ICD-10-CM

## 2024-03-15 PROCEDURE — 3074F SYST BP LT 130 MM HG: CPT

## 2024-03-15 PROCEDURE — 99213 OFFICE O/P EST LOW 20 MIN: CPT

## 2024-03-15 PROCEDURE — 3008F BODY MASS INDEX DOCD: CPT

## 2024-03-15 PROCEDURE — 3079F DIAST BP 80-89 MM HG: CPT

## 2024-03-15 RX ORDER — FLUOCINONIDE TOPICAL SOLUTION USP, 0.05% 0.5 MG/ML
SOLUTION TOPICAL
COMMUNITY
Start: 2024-02-05

## 2024-03-15 RX ORDER — MONTELUKAST SODIUM 10 MG/1
TABLET ORAL
COMMUNITY
Start: 2024-03-08

## 2024-03-15 RX ORDER — ERGOCALCIFEROL 1.25 MG/1
50000 CAPSULE ORAL WEEKLY
Qty: 12 CAPSULE | Refills: 0 | Status: SHIPPED | OUTPATIENT
Start: 2024-03-15 | End: 2024-06-07

## 2024-03-15 NOTE — PROGRESS NOTES
Nikky Mckeon is a 24 year old femalewho presents with   Chief Complaint   Patient presents with    Cyst     Possible a cyst on left armpit, getting smaller.      HPI:   Reports 4-5 d history of  slightly tender lump on left armpit. Saw the dermatologist about a similar one in past, on left side. no exposure to new skin/aundry products or chemicals. OTC remedies tried: pinched it but it hurt severely. Works nights. Sleeps on left side.    No current outpatient prescriptions on file.   No past medical history on file.   Social History:  Tobacco/Alcohol use not on file     REVIEW OF SYSTEMS:   GENERAL HEALTH: feels well otherwise; no fever or malaise; no recent viral infection/illness  SKIN: no pruritis, mild tenderness, no blister or drainage;    GI: denies abdominal pain and denies heartburn;    EXAM:   /80   Pulse 80   Temp 97.6 °F (36.4 °C)   Ht 5' 9\" (1.753 m)   Wt 254 lb (115.2 kg)   LMP  (LMP Unknown)   SpO2 99%   BMI 37.51 kg/m²   GENERAL: well developed, well nourished,in no apparent distress  SKIN: Rash: None;  There is a 5mm soft mobile superficial cyst located left central axilla. No red characteristics to skin overlying that spot. There is red base flat irritation at approximately the pectoralis fold;  no scaling, blisters or drainage;  no s/s secondary infection  NECK: no  cerv lymphadenopathy  BREAST: numerous cysts, tender. Some noted at left 6 o'clock at inferior areola margin, and at 2 o'clock, 2 cm from nipple line. Has several soft mobile cysts on right. These are soft and mobile.  EXTREMITIES: no cyanosis, clubbing or edema    ASSESSMENT AND PLAN:   Nikky Mckeon is a 24 year old female who presents with   Chief Complaint   Patient presents with    Cyst     Possible a cyst on left armpit, getting smaller.        ASSESSMENT:  Encounter Diagnoses   Name Primary?    Hidradenitis     Vitamin D insufficiency Yes     Mild hidradenitis      PLAN:  Requested Prescriptions      Signed Prescriptions Disp Refills    ergocalciferol 1.25 MG (51627 UT) Oral Cap 12 capsule 0     Sig: Take 1 capsule (50,000 Units total) by mouth once a week.       Patient Instructions   Use a non- corn starch powder on arm pits at bedtime, or Desenex antifungal powder. Wear 100% cotton shirt to sleep in with flares. Do not pinch these at may make them worse or create a second and deeper cyst.   You can use Hibiclens/Chlorhexidine soap in the arm pits daily with flares or at least a few times a week as maintenance or during hot, sweaty season. Consider using an electric shaver, or avoid shaving in the cool/cold months.  Use less Caffeine and consider Evening Primrose if breast cysts are tender.    Avoid hot showers, as this can aggravate the rash. Cool/tepid showers with minimal mild soap. Gently pat area dry.  Cold compresses may help relieve the irritation    The patient indicates understanding of these issues and agrees to the plan.

## 2024-03-16 NOTE — PATIENT INSTRUCTIONS
Use a non- corn starch powder on arm pits at bedtime, or Desenex antifungal powder. Wear 100% cotton shirt to sleep in with flares. Do not pinch these at may make them worse or create a second and deeper cyst.   You can use Hibiclens/Chlorhexidine soap in the arm pits daily with flares or at least a few times a week as maintenance or during hot, sweaty season. Consider using an electric shaver, or avoid shaving in the cool/cold months.  You irregular sleep schedule plays into this. Try to sleep and be awake at the same time every day.  Vitamin D fosters healthy immune system.

## 2024-04-15 ENCOUNTER — APPOINTMENT (OUTPATIENT)
Dept: URBAN - METROPOLITAN AREA CLINIC 244 | Age: 24
Setting detail: DERMATOLOGY
End: 2024-04-17

## 2024-04-15 DIAGNOSIS — L50.2 URTICARIA DUE TO COLD AND HEAT: ICD-10-CM

## 2024-04-15 DIAGNOSIS — L50.8 OTHER URTICARIA: ICD-10-CM

## 2024-04-15 PROCEDURE — OTHER PRESCRIPTION MEDICATION MANAGEMENT: OTHER

## 2024-04-15 PROCEDURE — OTHER PRESCRIPTION: OTHER

## 2024-04-15 PROCEDURE — OTHER COUNSELING: OTHER

## 2024-04-15 PROCEDURE — 99214 OFFICE O/P EST MOD 30 MIN: CPT

## 2024-04-15 RX ORDER — EPINEPHRINE 0.3 MG/.3ML
INJECTION INTRAMUSCULAR X 1
Qty: 1 | Refills: 3 | Status: ERX | COMMUNITY
Start: 2024-04-15

## 2024-04-15 ASSESSMENT — LOCATION ZONE DERM
LOCATION ZONE: ARM
LOCATION ZONE: LEG
LOCATION ZONE: TRUNK

## 2024-04-15 ASSESSMENT — LOCATION DETAILED DESCRIPTION DERM
LOCATION DETAILED: RIGHT ANTERIOR DISTAL THIGH
LOCATION DETAILED: LEFT DISTAL DORSAL FOREARM
LOCATION DETAILED: RIGHT PROXIMAL DORSAL FOREARM
LOCATION DETAILED: LEFT ANTERIOR PROXIMAL THIGH
LOCATION DETAILED: RIGHT SUPERIOR MEDIAL UPPER BACK

## 2024-04-15 ASSESSMENT — LOCATION SIMPLE DESCRIPTION DERM
LOCATION SIMPLE: RIGHT UPPER BACK
LOCATION SIMPLE: RIGHT FOREARM
LOCATION SIMPLE: LEFT THIGH
LOCATION SIMPLE: RIGHT THIGH
LOCATION SIMPLE: LEFT FOREARM

## 2024-04-15 NOTE — HPI: RASH
What Type Of Note Output Would You Prefer (Optional)?: Bullet Format
Is This A New Presentation, Or A Follow-Up?: Rash
Additional History: When scratching, hives worsen

## 2024-04-15 NOTE — PROCEDURE: PRESCRIPTION MEDICATION MANAGEMENT
Initiate Treatment: Zyrtec two tablets twice a day (switch to Allegra 24hr one tablet BID- if zyrtec makes pt sleepy).
Plan: Recc to see an allergist
Detail Level: Zone
Render In Strict Bullet Format?: No
Plan: epi pen dispensed.

## 2024-04-18 PROBLEM — E66.01 CLASS 2 SEVERE OBESITY DUE TO EXCESS CALORIES WITH SERIOUS COMORBIDITY AND BODY MASS INDEX (BMI) OF 37.0 TO 37.9 IN ADULT (HCC): Status: ACTIVE | Noted: 2024-04-18

## 2024-04-18 PROBLEM — R30.0 DYSURIA: Status: RESOLVED | Noted: 2023-07-21 | Resolved: 2024-04-18

## 2024-04-18 PROBLEM — F45.22 BODY DYSMORPHIC DISORDER: Status: ACTIVE | Noted: 2024-04-18

## 2024-04-18 PROBLEM — F32.1 CURRENT MODERATE EPISODE OF MAJOR DEPRESSIVE DISORDER WITHOUT PRIOR EPISODE (HCC): Status: ACTIVE | Noted: 2021-08-30

## 2024-04-18 PROBLEM — E66.812 CLASS 2 SEVERE OBESITY DUE TO EXCESS CALORIES WITH SERIOUS COMORBIDITY AND BODY MASS INDEX (BMI) OF 37.0 TO 37.9 IN ADULT (HCC): Status: ACTIVE | Noted: 2024-04-18

## 2024-04-18 PROBLEM — R63.2 BINGE EATING: Status: ACTIVE | Noted: 2024-04-18

## 2024-04-25 ENCOUNTER — TELEPHONE (OUTPATIENT)
Age: 24
End: 2024-04-25

## 2024-05-02 ENCOUNTER — TELEPHONE (OUTPATIENT)
Age: 24
End: 2024-05-02

## 2024-05-02 NOTE — TELEPHONE ENCOUNTER
Brii Holliday, [facility]  [address], [address], [place], [address]  Phone: [phone]    Kaela Boyce \[facility] [place] [facility]\""   [facility]  [address], [address], [place], [place], [address]  Phone: [phone]    Michelle Catalan, [facility]  [facility]  [address], [place], [address]  Phone: [phone]    Marivel Jose, PhD  [address], [address]  Phone: [phone]

## 2024-05-16 ENCOUNTER — OFFICE VISIT (OUTPATIENT)
Dept: INTERNAL MEDICINE CLINIC | Facility: CLINIC | Age: 24
End: 2024-05-16

## 2024-05-16 VITALS
OXYGEN SATURATION: 98 % | HEART RATE: 70 BPM | SYSTOLIC BLOOD PRESSURE: 128 MMHG | DIASTOLIC BLOOD PRESSURE: 76 MMHG | BODY MASS INDEX: 38.36 KG/M2 | TEMPERATURE: 98 F | HEIGHT: 69 IN | WEIGHT: 259 LBS

## 2024-05-16 DIAGNOSIS — M23.206 OLD TEAR OF MENISCUS OF RIGHT KNEE, UNSPECIFIED MENISCUS, UNSPECIFIED TEAR TYPE: ICD-10-CM

## 2024-05-16 DIAGNOSIS — F90.9 ATTENTION DEFICIT HYPERACTIVITY DISORDER (ADHD), UNSPECIFIED ADHD TYPE: ICD-10-CM

## 2024-05-16 DIAGNOSIS — F32.1 CURRENT MODERATE EPISODE OF MAJOR DEPRESSIVE DISORDER WITHOUT PRIOR EPISODE (HCC): ICD-10-CM

## 2024-05-16 DIAGNOSIS — F45.22 BODY DYSMORPHIC DISORDER: ICD-10-CM

## 2024-05-16 DIAGNOSIS — E66.01 CLASS 2 SEVERE OBESITY DUE TO EXCESS CALORIES WITH SERIOUS COMORBIDITY AND BODY MASS INDEX (BMI) OF 38.0 TO 38.9 IN ADULT (HCC): ICD-10-CM

## 2024-05-16 DIAGNOSIS — J45.40 MODERATE PERSISTENT ASTHMA WITHOUT COMPLICATION (HCC): ICD-10-CM

## 2024-05-16 DIAGNOSIS — L40.9 PSORIASIS: ICD-10-CM

## 2024-05-16 DIAGNOSIS — F41.1 GENERALIZED ANXIETY DISORDER: ICD-10-CM

## 2024-05-16 DIAGNOSIS — K51.90 ULCERATIVE COLITIS WITHOUT COMPLICATIONS, UNSPECIFIED LOCATION (HCC): ICD-10-CM

## 2024-05-16 DIAGNOSIS — Z00.00 HEALTH MAINTENANCE EXAMINATION: Primary | ICD-10-CM

## 2024-05-16 DIAGNOSIS — N92.0 MENORRHAGIA WITH REGULAR CYCLE: ICD-10-CM

## 2024-05-16 DIAGNOSIS — J30.2 SEASONAL ALLERGIES: ICD-10-CM

## 2024-05-16 DIAGNOSIS — R63.2 BINGE EATING: ICD-10-CM

## 2024-05-16 DIAGNOSIS — L65.9 HAIR LOSS: ICD-10-CM

## 2024-05-16 PROBLEM — E66.812 CLASS 2 SEVERE OBESITY DUE TO EXCESS CALORIES WITH SERIOUS COMORBIDITY AND BODY MASS INDEX (BMI) OF 38.0 TO 38.9 IN ADULT (HCC): Status: ACTIVE | Noted: 2024-04-18

## 2024-05-16 LAB
ALBUMIN SERPL-MCNC: 4.3 G/DL (ref 3.2–4.8)
ALBUMIN/GLOB SERPL: 1.4 {RATIO} (ref 1–2)
ALP LIVER SERPL-CCNC: 42 U/L
ALT SERPL-CCNC: 29 U/L
ANION GAP SERPL CALC-SCNC: 8 MMOL/L (ref 0–18)
AST SERPL-CCNC: 29 U/L (ref ?–34)
BASOPHILS # BLD AUTO: 0.02 X10(3) UL (ref 0–0.2)
BASOPHILS NFR BLD AUTO: 0.4 %
BILIRUB SERPL-MCNC: 0.4 MG/DL (ref 0.3–1.2)
BUN BLD-MCNC: 8 MG/DL (ref 9–23)
BUN/CREAT SERPL: 11.6 (ref 10–20)
CALCIUM BLD-MCNC: 9.3 MG/DL (ref 8.7–10.4)
CHLORIDE SERPL-SCNC: 110 MMOL/L (ref 98–112)
CHOLEST SERPL-MCNC: 186 MG/DL (ref ?–200)
CO2 SERPL-SCNC: 23 MMOL/L (ref 21–32)
CREAT BLD-MCNC: 0.69 MG/DL
DEPRECATED RDW RBC AUTO: 41.1 FL (ref 35.1–46.3)
EGFRCR SERPLBLD CKD-EPI 2021: 123 ML/MIN/1.73M2 (ref 60–?)
EOSINOPHIL # BLD AUTO: 0.09 X10(3) UL (ref 0–0.7)
EOSINOPHIL NFR BLD AUTO: 1.6 %
ERYTHROCYTE [DISTWIDTH] IN BLOOD BY AUTOMATED COUNT: 13 % (ref 11–15)
EST. AVERAGE GLUCOSE BLD GHB EST-MCNC: 94 MG/DL (ref 68–126)
FASTING PATIENT LIPID ANSWER: YES
FASTING STATUS PATIENT QL REPORTED: YES
GLOBULIN PLAS-MCNC: 3.1 G/DL (ref 2–3.5)
GLUCOSE BLD-MCNC: 73 MG/DL (ref 70–99)
HBA1C MFR BLD: 4.9 % (ref ?–5.7)
HCT VFR BLD AUTO: 41.2 %
HDLC SERPL-MCNC: 66 MG/DL (ref 40–59)
HGB BLD-MCNC: 14 G/DL
IMM GRANULOCYTES # BLD AUTO: 0 X10(3) UL (ref 0–1)
IMM GRANULOCYTES NFR BLD: 0 %
LDLC SERPL CALC-MCNC: 92 MG/DL (ref ?–100)
LYMPHOCYTES # BLD AUTO: 2 X10(3) UL (ref 1–4)
LYMPHOCYTES NFR BLD AUTO: 35 %
MCH RBC QN AUTO: 29.7 PG (ref 26–34)
MCHC RBC AUTO-ENTMCNC: 34 G/DL (ref 31–37)
MCV RBC AUTO: 87.3 FL
MONOCYTES # BLD AUTO: 0.37 X10(3) UL (ref 0.1–1)
MONOCYTES NFR BLD AUTO: 6.5 %
NEUTROPHILS # BLD AUTO: 3.23 X10 (3) UL (ref 1.5–7.7)
NEUTROPHILS # BLD AUTO: 3.23 X10(3) UL (ref 1.5–7.7)
NEUTROPHILS NFR BLD AUTO: 56.5 %
NONHDLC SERPL-MCNC: 120 MG/DL (ref ?–130)
OSMOLALITY SERPL CALC.SUM OF ELEC: 289 MOSM/KG (ref 275–295)
PLATELET # BLD AUTO: 322 10(3)UL (ref 150–450)
POTASSIUM SERPL-SCNC: 4.2 MMOL/L (ref 3.5–5.1)
PROT SERPL-MCNC: 7.4 G/DL (ref 5.7–8.2)
RBC # BLD AUTO: 4.72 X10(6)UL
SODIUM SERPL-SCNC: 141 MMOL/L (ref 136–145)
TRIGL SERPL-MCNC: 162 MG/DL (ref 30–149)
TSI SER-ACNC: 1.91 MIU/ML (ref 0.55–4.78)
VIT D+METAB SERPL-MCNC: 23.2 NG/ML (ref 30–100)
VLDLC SERPL CALC-MCNC: 26 MG/DL (ref 0–30)
WBC # BLD AUTO: 5.7 X10(3) UL (ref 4–11)

## 2024-05-16 PROCEDURE — 83036 HEMOGLOBIN GLYCOSYLATED A1C: CPT | Performed by: FAMILY MEDICINE

## 2024-05-16 PROCEDURE — 3074F SYST BP LT 130 MM HG: CPT | Performed by: FAMILY MEDICINE

## 2024-05-16 PROCEDURE — 90677 PCV20 VACCINE IM: CPT | Performed by: FAMILY MEDICINE

## 2024-05-16 PROCEDURE — 86480 TB TEST CELL IMMUN MEASURE: CPT | Performed by: FAMILY MEDICINE

## 2024-05-16 PROCEDURE — 80061 LIPID PANEL: CPT | Performed by: FAMILY MEDICINE

## 2024-05-16 PROCEDURE — 87389 HIV-1 AG W/HIV-1&-2 AB AG IA: CPT | Performed by: FAMILY MEDICINE

## 2024-05-16 PROCEDURE — 90471 IMMUNIZATION ADMIN: CPT | Performed by: FAMILY MEDICINE

## 2024-05-16 PROCEDURE — 82306 VITAMIN D 25 HYDROXY: CPT | Performed by: FAMILY MEDICINE

## 2024-05-16 PROCEDURE — 3078F DIAST BP <80 MM HG: CPT | Performed by: FAMILY MEDICINE

## 2024-05-16 PROCEDURE — 3008F BODY MASS INDEX DOCD: CPT | Performed by: FAMILY MEDICINE

## 2024-05-16 PROCEDURE — 99395 PREV VISIT EST AGE 18-39: CPT | Performed by: FAMILY MEDICINE

## 2024-05-16 PROCEDURE — 80050 GENERAL HEALTH PANEL: CPT | Performed by: FAMILY MEDICINE

## 2024-05-16 RX ORDER — ALBUTEROL SULFATE 90 UG/1
2 AEROSOL, METERED RESPIRATORY (INHALATION) EVERY 6 HOURS PRN
COMMUNITY
End: 2024-05-16

## 2024-05-16 RX ORDER — MONTELUKAST SODIUM 10 MG/1
10 TABLET ORAL NIGHTLY
Qty: 90 TABLET | Refills: 3 | Status: SHIPPED | OUTPATIENT
Start: 2024-05-16

## 2024-05-16 RX ORDER — ALBUTEROL SULFATE 90 UG/1
2 AEROSOL, METERED RESPIRATORY (INHALATION) EVERY 6 HOURS PRN
Qty: 18 G | Refills: 3 | Status: SHIPPED | OUTPATIENT
Start: 2024-05-16

## 2024-05-16 RX ORDER — EPINEPHRINE 0.3 MG/.3ML
0.3 INJECTION SUBCUTANEOUS AS NEEDED
COMMUNITY

## 2024-05-16 NOTE — ASSESSMENT & PLAN NOTE
History of asthma and allergies.   Overall well controlled at this time.  Historically has seen pulmonology, but not seeing at this time.   Continue cetirizine.   Continue montelukast 10 mg nightly  Uses albuterol as needed.  If worsening asthma, can follow up with me as needed.

## 2024-05-16 NOTE — ASSESSMENT & PLAN NOTE
Patient reports history of ADHD.  She does not feel like she needs any sort of medication at this time.

## 2024-05-16 NOTE — PATIENT INSTRUCTIONS
PATIENT INSTRUCTIONS    Thank you for seeing me today, it was a pleasure taking care of you.  Please check out at the  and schedule a follow up appointment.  Return in about 1 year (around 5/16/2025) for physical .  Please remember that the nasrin period for all appointments is 5 minutes. This is to help maximize the amount of time that we can spend together at our visits.    Please get your labs drawn - you may need to schedule a lab appointment if this was not completed at your recent doctor's visit.  The following imaging studies were ordered: None  Please also follow up with the following specialists: GI, GYN, dermatology, psychiatry   Please fill out the advance directive information (power of  documents) and bring a copy to our clinic.  Continue to focus on a healthy diet and exercise   Jump Start program  Continue all current medications   If depression gets much worse before you see psychiatry, can reach out to me and I will help you adjust your medication       Best,   Dr. Montes

## 2024-05-16 NOTE — ASSESSMENT & PLAN NOTE
Try to focus on a healthy diet and exercise.   See weight specialist.  Consider Jump Start weight program

## 2024-05-16 NOTE — ASSESSMENT & PLAN NOTE
Patient with a history of psoriasis.  Also with chronic intermittent hives.  Following with dermatology.  Has epipen.   Overall reports that she is doing well at this time.

## 2024-05-16 NOTE — PROGRESS NOTES
FAMILY MEDICINE CLINIC NOTE    HPI  Nikky Mckeon is a 24 year old female presenting for physical    #Health Maintenance  -Diet: Eating better now - eating at home.   -Exercise: Taking dog for walk, bike fixed   -Lung cancer screen: Not indicated  -Colon cancer screen: Indicated - follows with GI   -Statin:  Will check lipid panel  -ASA: Not indicated  -Breast cancer screen: Not indicated  -Breast cancer medication to reduce risk: Declines   -Periods: LMP over a year ago.   -Cervical cancer screen: - 10/17/22 - normal pap, repeat in 3 years.  Follows with GYN  -DEXA: Not indicated  -BRCA: Not indicated  -Intimate partner violence: Denies abuse  -HIV screen: Indicated  -Hep C screen: - 5/2021 negative  -Gonorrhea/chlamydia:  Not Indicated  -Syphillis: Not indicated  -TB: Not indicated  -Tobacco/alcohol: Per below  -Depression: PHQ-2 score of 2 (score >/= 3 do PHQ-9)  -Advanced Directive: Indicated    #Immunizations  -Tdap:  2/2021  -Flu shot: Not indicated - not season  -PCV13: Not indicated   -PCV20: Indicated   -PPSV23: Not indicated   -HPV:  Received  -RZV (preferred) or VZL: Not indicated   -RSV: Not indicated   -COVID: Not indicated       #Binge eating  #Body dysmorphia  #Obesity  -working on a healthy diet   -was seeing a therapist - desires to see a new one  -desiring further help with weight    #MDD  #TRISTON  #ADHD  -escitalopram 20 mg daily   -bupropion 150 mg daily   -reports ADHD evaluated by psychiatry in the past   -not needing adderall at this time, has used in the past and didn't like it  -depression lately worsening - health concerns, new job here  -after starting bupropion, reports mood has been better  -has plans to see psychiatry now to discuss meds - thinking about pregnancy  -looking for a new therapist       #Asthma  #Seasonal allergies  -cetirizine  -montelukast 10 mg nightly  -albuterol as needed  -pulmonology - Dr Ricks in the past     #Psoriasis  #Hives  -history of scalp  psoriasis  -comes and goes   -dermatology - Dr Walls  -epipen     #Heavy periods  -gynecology - Dr Smith   -microgestin fe - skipping placebo     #History of multiple meniscus tears  -history of multiple surgeries  -R knee  -reports getting gel injections   -ortho Dr Garza      #UC  -GI - Dr Seals  -ustekinumab  -reports overall doing well at this time  -plan for colonoscopy in June    #Patient Care Team  Patient Care Team:  Charan Montes MD as PCP - General (Family Medicine)  JESUS Correa MD (GASTROENTEROLOGY)  Vargas Ricks (NEUROLOGY)  Yonatan Walls MD (DERMATOLOGY)  Paty Lemus MD (OBSTETRICS & GYNECOLOGY)  Boston Sibley MD as Referring Physician (SURGERY, ORTHOPEDIC)  Clark Ochoa MD (SURGERY, ORTHOPEDIC)    ROS  GENERAL: No fever/chills, no recent weight loss   HEENT: No visual changes, no changes in hearing, no sore throats  NECK: No pain, no swelling  RESP: No cough, no SOB  CV: No chest pain, no palpitations  GI: No abd pain, no N/V/D  MSK: No edema, no pain  SKIN: No new rashes  NEURO: No numbness, no tingling, no HA    HEALTH MAINTENANCE CHECKLIST  Health Maintenance Topics with due status: Overdue       Topic Date Due    Annual Physical Never done    Asthma Action Plan Never done    Asthma Control Test Never done    Pneumococcal Vaccine: Birth to 64yrs Never done    COVID-19 Vaccine 09/01/2023    TB Screen 02/07/2024     Health Maintenance Topics with due status: Due Soon       Topic Date Due    Colorectal Cancer Screening 06/08/2024       ALLERGIES  Allergies   Allergen Reactions    Chloraprep One Step HIVES    Chlorhexidine HIVES    Methylprednisolone ITCHING, SWELLING and UNKNOWN    Bananas OTHER (SEE COMMENTS)     Sores in mouth    Ibuprofen OTHER (SEE COMMENTS)    Aluminum RASH       MEDICATIONS  Current Outpatient Medications   Medication Sig Dispense Refill    EPINEPHrine (EPIPEN 2-LAURA) 0.3 MG/0.3ML Injection Solution Auto-injector Inject 0.3 mL (1  each total) as directed as needed.      albuterol 108 (90 Base) MCG/ACT Inhalation Aero Soln Inhale 2 puffs into the lungs every 6 (six) hours as needed for Wheezing. 18 g 3    montelukast 10 MG Oral Tab Take 1 tablet (10 mg total) by mouth nightly. 90 tablet 3    buPROPion  MG Oral Tablet 24 Hr Take 1 tablet (150 mg total) by mouth daily. 90 tablet 0    hydrocortisone 2.5 % External Cream       Fluocinonide 0.05 % External Solution APPLY EXTERNALLY TO THE AFFECTED AREA AS NEEDED FOR ITCHING ON SCALP      ergocalciferol 1.25 MG (15054 UT) Oral Cap Take 1 capsule (50,000 Units total) by mouth once a week. 12 capsule 0    ketoconazole 2 % External Shampoo       Norethin Ace-Eth Estrad-FE (MICROGESTIN FE 1/20) 1-20 MG-MCG Oral Tab Take one by mouth daily. Patient may skip placebos. 84 tablet 3    ustekinumab 90 MG/ML Subcutaneous Solution Prefilled Syringe injection Inject 1 mL (90 mg total) into the skin every 8 weeks. First injection 8 weeks after the initial intravenous dose, then continue every 8 weeks thereafter 1 mL 11    escitalopram 20 MG Oral Tab Take 1 tablet (20 mg total) by mouth daily. 90 tablet 3       ACTIVE PROBLEM  Patient Active Problem List   Diagnosis    Ulcerative colitis (HCC)    Asthma (HCC)    Current moderate episode of major depressive disorder without prior episode (HCC)    Psoriasis    Generalized anxiety disorder    ADHD    Right knee meniscal tear    Heavy period    Class 2 severe obesity due to excess calories with serious comorbidity and body mass index (BMI) of 38.0 to 38.9 in adult (HCC)    Binge eating    Body dysmorphic disorder    Health maintenance examination    Seasonal allergies       PAST MEDICAL HISTORY  Past Medical History:    ADHD    Asthma (HCC)    Generalized anxiety disorder    Internal hemorrhoids    Right knee meniscal tear    Tubular adenoma of colon    repeat CLN in 2024    Ulcerative colitis (HCC)    on c-scope, minimal L sided disease       PAST SOCIAL  HISTORY  Social History     Socioeconomic History    Marital status: Single     Spouse name: Not on file    Number of children: Not on file    Years of education: Not on file    Highest education level: Not on file   Occupational History    Occupation: Nurse   Tobacco Use    Smoking status: Never    Smokeless tobacco: Never   Vaping Use    Vaping status: Former    Substances: Nicotine   Substance and Sexual Activity    Alcohol use: Yes     Comment: twice a month - socially    Drug use: Never    Sexual activity: Yes     Partners: Male     Birth control/protection: Pill   Other Topics Concern    Grew up on a farm Not Asked    History of tanning Not Asked    Outdoor occupation Not Asked    Pt has a pacemaker No    Pt has a defibrillator No    Breast feeding Not Asked    Reaction to local anesthetic No    Caffeine Concern Not Asked    Exercise Not Asked    Seat Belt Not Asked    Special Diet Not Asked    Stress Concern Not Asked    Weight Concern Not Asked     Service Not Asked    Blood Transfusions No    Occupational Exposure Not Asked    Hobby Hazards Not Asked    Sleep Concern Not Asked    Back Care Not Asked    Bike Helmet Not Asked    Self-Exams Not Asked   Social History Narrative    Relationships: Mother - Yeimi.* Justo- Bautista - Getting  in February 2025    Children: None    Pets: Dog    School: N/A    Work: Nurse - [employer]    Origin: Grew up in [facility].     Interests: Enjoys arts - [employer], doing nails    Spiritual: Not Jain, not spiritual      Social Determinants of Health     Financial Resource Strain: Not on file   Food Insecurity: Not on file   Transportation Needs: Not on file   Physical Activity: Not on file   Stress: Not on file   Social Connections: Unknown (4/20/2021)    [facility]    Social Connections     Conversations with friends/family/neighbors per week: Not on file   Housing Stability: Not on file        PAST SURGICAL HISTORY  Past Surgical History:   Procedure Laterality Date    Arthroscopy of joint unlisted      Colonoscopy N/A 08/19/2020    Procedure: COLONOSCOPY;  Surgeon: JESUS Correa MD;  Location: [facility] ENDOSCOPY    Colonoscopy      Colonoscopy N/A 06/08/2022    Procedure: COLONOSCOPY;  Surgeon: JESUS Correa MD;  Location: [facility] ENDOSCOPY    Foot surgery Bilateral      x4 2018    Knee surgery Right     2015, 2017, 2018, 2019    Knee surgery Left 03/2023    [place] teeth removed         PAST FAMILY HISTORY  Family History   Problem Relation Age of Onset    Anxiety Mother     Arthritis Mother     Other (Kidney stone) Mother     Heart Attack Father     Thyroid Cancer Sister 21    Other (Esophagitis) Brother         EOE    Fibromyalgia Maternal Grandmother     Other (SLE) Maternal Grandmother     Heart Attack Maternal Grandmother     Heart Attack Maternal Grandfather     No Known Problems Paternal Grandmother     Cancer Paternal Grandfather         Lung    Ovarian Cancer Neg     Colon Cancer Neg     Breast Cancer Neg        PHYSICAL EXAM  Vitals:    05/16/24 1038   BP: 128/76   Pulse: 70   Temp: 98.2 °F (36.8 °C)   SpO2: 98%   Weight: 259 lb (117.5 kg)   Height: 5' 9\" (1.753 m)      Body mass index is 38.25 kg/m².    GENERAL: NAD  HEENT: Moist mucous membranes, no tonsillar swelling or erythema, PERRLA bilat, TM translucent and non-bulging  NECK: Supple, non-tender  RESP: CTAB, no wheezing, no rales, no ronchi  CV: RRR, no murmurs  GI: Soft, non-distended, non-tender, no guarding, no rebound, no masses  MSK: No edema  SKIN: Warm and dry, no rashes  NEURO: Answering questions appropriately    LABS  Lab Results   Component Value Date    WBC 6.8 07/29/2023    HGB 13.4 07/29/2023    HCT 40.9 07/29/2023    .0 07/29/2023    NEPERCENT 48.5 07/29/2023    LYPERCENT 40.7 07/29/2023    MOPERCENT 6.6 07/29/2023    EOPERCENT 3.7 07/29/2023    BAPERCENT 0.4 07/29/2023    NE 3.28 07/29/2023    LYMABS 2.76  07/29/2023    MOABSO 0.45 07/29/2023    EOABSO 0.25 07/29/2023    BAABSO 0.03 07/29/2023       Lab Results   Component Value Date     07/29/2023    K 3.8 07/29/2023     07/29/2023    CO2 24.0 07/29/2023    ANIONGAP 7 07/29/2023    BUN 7 07/29/2023    CREATSERUM 0.76 07/29/2023    BUNCREA 9.2 (L) 07/29/2023    GLU 89 07/29/2023    CA 8.7 07/29/2023    OSMOCALC 287 07/29/2023    GFRNAA 124 05/24/2022    GFRAA 143 05/24/2022    ALT 26 07/29/2023    AST 24 07/29/2023    ALKPHO 41 07/29/2023    BILT 0.4 07/29/2023    TP 7.5 07/29/2023    ALB 3.3 (L) 07/29/2023    GLOBULIN 4.2 07/29/2023    ELECTAG 0.8 (L) 07/29/2023    FASTING Yes 07/29/2023         No results found for: \"CHOLEST\", \"TRIG\", \"HDL\", \"LDL\", \"VLDL\", \"TCHDLRATIO\", \"NONHDLC\", \"CHOLHDLRATIO\", \"CALCNONHDL\"     DIAGNOSTICS    ASSESSMENT/PLAN  Problem List Items Addressed This Visit          [facility] Problems    Class 2 severe obesity due to excess calories with serious comorbidity and body mass index (BMI) of 38.0 to 38.9 in adult ([facility])     Try to focus on a healthy diet and exercise.   See weight specialist.  Consider Jump Start weight program         Current moderate episode of major depressive disorder without prior episode ([facility])     Patient with a history of depression and anxiety.  Depression now starting to improve.    Continue escitalopram 20 mg daily.  Continue bupropion  mg daily. Discussed option of increasing dosage - she wants to hold off for now. Has plans to see psychiatry to discuss medication options especially since she wants to have kids moving forward.   Desires to switch therapists  Follow up as needed         Ulcerative colitis ([facility])     With a history of ulcerative colitis  Following with GI.  Currently on Stelara.  Overall doing much better at this time.            Pulmonary and Pneumonias    Asthma (HCC)     History of asthma and allergies.   Overall well controlled at this time.  Historically has seen pulmonology, but not  seeing at this time.   Continue cetirizine.   Continue montelukast 10 mg nightly  Uses albuterol as needed.  If worsening asthma, can follow up with me as needed.          Relevant Medications    EPINEPHrine (EPIPEN 2-LAURA) 0.3 MG/0.3ML Injection Solution Auto-injector    albuterol 108 (90 Base) MCG/ACT Inhalation Aero Soln    montelukast 10 MG Oral Tab    Other Relevant Orders    PCV20 VACCINE FOR INTRAMUSCULAR USE       Mental Health    ADHD     Patient reports history of ADHD.  She does not feel like she needs any sort of medication at this time.         Body dysmorphic disorder     Patient reports a history of body dysmorphia  On escitalopram and bupropion  See therapist         Generalized anxiety disorder     Patient with a history of depression and anxiety.  Depression now starting to improve.    Continue escitalopram 20 mg daily.  Continue bupropion  mg daily. Discussed option of increasing dosage - she wants to hold off for now. Has plans to see psychiatry to discuss medication options especially since she wants to have kids moving forward.   Desires to switch therapists  Follow up as needed            Musculoskeletal and Injuries    Right knee meniscal tear     Patient with a history of multiple right knee injuries, meniscal tears  She has had multiple knee surgeries  Follows with Ortho.            Genitourinary and Reproductive    Heavy period     Patient with a history of heavy periods  Currently now on birth control medication which has helped.  Follows with gynecology            EarNoseThroat    Seasonal allergies     History of asthma and allergies.   Overall well controlled at this time.  Historically has seen pulmonology, but not seeing at this time.   Continue cetirizine.   Continue montelukast 10 mg nightly  Uses albuterol as needed.  If worsening asthma, can follow up with me as needed.             Skin    RESOLVED: Hair loss     Overall improved         Psoriasis     Patient with a history  of psoriasis.  Also with chronic intermittent hives.  Following with dermatology.  Has epipen.   Overall reports that she is doing well at this time.            Symptoms and Signs    Binge eating     Referred to weight specialist            Health Encounters    Health maintenance examination - Primary     Exercise and diet advised.  CBC, CMP, lipid panel, Hba1c, TSH, HIV screen, Quantiferon  Prevnar 20 vaccine.   Advanced directive information provided.  Advised COVID vaccine.  Has plans for colonoscopy.          Relevant Orders    Quantiferon TB Plus    CBC With Differential With Platelet    Comp Metabolic Panel (14)    Hemoglobin A1C    HIV Ag/Ab Combo    Lipid Panel    TSH W Reflex To Free T4    PCV20 VACCINE FOR INTRAMUSCULAR USE    Vitamin D [E]       Return in about 1 year (around 5/16/2025) for physical .    Charan Montes MD  Family Medicine

## 2024-05-16 NOTE — ASSESSMENT & PLAN NOTE
Exercise and diet advised.  CBC, CMP, lipid panel, Hba1c, TSH, HIV screen, Quantiferon  Prevnar 20 vaccine.   Advanced directive information provided.  Advised COVID vaccine.  Has plans for colonoscopy.

## 2024-05-16 NOTE — ASSESSMENT & PLAN NOTE
With a history of ulcerative colitis  Following with GI.  Currently on Stelara.  Overall doing much better at this time.

## 2024-05-16 NOTE — ASSESSMENT & PLAN NOTE
Patient with a history of multiple right knee injuries, meniscal tears  She has had multiple knee surgeries  Follows with Ortho.

## 2024-05-16 NOTE — ASSESSMENT & PLAN NOTE
Patient with a history of depression and anxiety.  Depression now starting to improve.    Continue escitalopram 20 mg daily.  Continue bupropion  mg daily. Discussed option of increasing dosage - she wants to hold off for now. Has plans to see psychiatry to discuss medication options especially since she wants to have kids moving forward.   Desires to switch therapists  Follow up as needed

## 2024-05-18 LAB
M TB IFN-G CD4+ T-CELLS BLD-ACNC: 0.08 IU/ML
M TB TUBERC IFN-G BLD QL: NEGATIVE
M TB TUBERC IGNF/MITOGEN IGNF CONTROL: >10 IU/ML
QFT TB1 AG MINUS NIL: 0.04 IU/ML
QFT TB2 AG MINUS NIL: 0.01 IU/ML

## 2024-05-31 ENCOUNTER — NURSE TRIAGE (OUTPATIENT)
Dept: INTERNAL MEDICINE CLINIC | Facility: CLINIC | Age: 24
End: 2024-05-31

## 2024-05-31 DIAGNOSIS — E66.01 CLASS 2 SEVERE OBESITY DUE TO EXCESS CALORIES WITH SERIOUS COMORBIDITY AND BODY MASS INDEX (BMI) OF 37.0 TO 37.9 IN ADULT (HCC): ICD-10-CM

## 2024-05-31 DIAGNOSIS — F32.4 MAJOR DEPRESSIVE DISORDER WITH SINGLE EPISODE, IN PARTIAL REMISSION (HCC): ICD-10-CM

## 2024-05-31 DIAGNOSIS — F41.1 GENERALIZED ANXIETY DISORDER: ICD-10-CM

## 2024-05-31 RX ORDER — BUPROPION HYDROCHLORIDE 150 MG/1
150 TABLET ORAL 2 TIMES DAILY
Qty: 180 TABLET | Refills: 0 | Status: SHIPPED | OUTPATIENT
Start: 2024-05-31

## 2024-05-31 NOTE — TELEPHONE ENCOUNTER
Message received from the patient's mother for Dr. Montes to call the patient.  Spoke to Nikky stated she is overwhelmed and things ar building up. Patient cannot stop crying when not at work.   Patient stated she does not want to get out of bed and sleeping 12 hours a day. Patient is taking wellbutrin. 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 Patient is also planning a wedding. Patient denied suicidal or homicidal ideation.    Disposition: 3 days but the patient would like to speak to Dr. Montes today. She stated if he cannot call her he is requesting [employer] communication.    Patient has an appointment June 20th initial appointment with Psychiatrist. Patient will discuss with Psychiatry recommendations for a Therapeutics.    RN advised the patient if develops worsening symptoms where she cannot complete her ADLs or sucidal /homicidal ideation to go immediately to the ED. Patient voiced understanding.    Message sent to Dr. Montes to call the patient per her request.        Reason for Disposition   Depression is worsening (e.g.,sleeping poorly, less able to do activities of daily living)    Answer Assessment - Initial Assessment Questions  1. CONCERN: \"What happened that made you call today?\"      Unknown just keeps crying  2. DEPRESSION SYMPTOM SCREENING: \"How are you feeling overall?\" (e.g., decreased energy, increased sleeping or difficulty sleeping, difficulty concentrating, feelings of sadness, guilt, hopelessness, or worthlessness)      Sleeping a lot; overwhelmed  3. RISK OF HARM - SUICIDAL IDEATION:  \"Do you ever have thoughts of hurting or killing yourself?\"  (e.g., yes, no, no but preoccupation with thoughts about death)    - INTENT:  \"Do you have thoughts of hurting or killing yourself right NOW?\" (e.g., yes, no, N/A)    - PLAN: \"Do you have a specific plan for how you would do this?\" (e.g., gun, knife, overdose, no plan, N/A)      Denied  4. RISK OF HARM -  HOMICIDAL IDEATION:  \"Do you ever have thoughts of hurting or killing someone else?\"  (e.g., yes, no, no but preoccupation with thoughts about death)    - INTENT:  \"Do you have thoughts of hurting or killing someone right NOW?\" (e.g., yes, no, N/A)    - PLAN: \"Do you have a specific plan for how you would do this?\" (e.g., gun, knife, no plan, N/A)       Denied  5. FUNCTIONAL IMPAIRMENT: \"How have things been going for you overall? Have you had more difficulty than usual doing your normal daily activities?\"  (e.g., better, same, worse; self-care, school, work, interactions)      No impairment ADLs  6. SUPPORT: \"Who is with you now?\" \"Who do you live with?\" \"Do you have family or friends who you can talk to?\"       Family  7. THERAPIST: \"Do you have a counselor or therapist? Name?\"      Denied but looking for a new therapist.  8. STRESSORS: \"Has there been any new stress or recent changes in your life?\"      New job and planning a wedding.  9. ALCOHOL USE OR SUBSTANCE USE (DRUG USE): \"Do you drink alcohol or use any illegal drugs?\"      Denied  10. OTHER: \"Do you have any other physical symptoms right now?\" (e.g., fever)        Denied  11. PREGNANCY: \"Is there any chance you are pregnant?\" \"When was your last menstrual period?\"        Denied    Protocols used: Depression-A-[place]

## 2024-05-31 NOTE — TELEPHONE ENCOUNTER
Called and discussed with patient. She has been increasingly depressed lately, lying around a lot more than usual. She has plans to see psychiatry 6/19/24. She will reach out to the psych group to see if she can get in with a therapist sooner. If not, advised that she can reach out to Jose Pearce to get more therapy information. Tentatively, increase bupropion to 150 mg twice daily. Will then have her see psychiatry to further adjust medications. ER precautions discussed. No SI/HI currently. All questions answered

## 2024-05-31 NOTE — TELEPHONE ENCOUNTER
Patient's mother called for her daughter, as her daughter just got off her night shift as a labor and delivery nurse.    The daughter asked her mother,to please reach out to Dr. Montes.  Her daughter was prescribed Lexapro and another medication.  Her mother doesn't know the name of the other medication, as her daughter has it at her home.    Patient for the last 3 days has been crying and can't stop.  She said she didn't cry during her work shift but as soon as she walked out the door at work she started crying.Doesn't know why she crying.    They are asking if Dr. Montes can please call the daughter to discuss the medication not helping. If her daughter doesn't  the mother asks that Dr. Montes please call her.  She'll make sure to have her phone with her.

## 2024-07-10 ENCOUNTER — OFFICE VISIT (OUTPATIENT)
Dept: SURGERY | Facility: CLINIC | Age: 24
End: 2024-07-10
Payer: COMMERCIAL

## 2024-07-10 VITALS
HEIGHT: 69.1 IN | BODY MASS INDEX: 37.78 KG/M2 | HEART RATE: 95 BPM | SYSTOLIC BLOOD PRESSURE: 122 MMHG | OXYGEN SATURATION: 96 % | DIASTOLIC BLOOD PRESSURE: 80 MMHG | WEIGHT: 258 LBS

## 2024-07-10 DIAGNOSIS — R63.2 BINGE EATING: ICD-10-CM

## 2024-07-10 DIAGNOSIS — F41.9 ANXIETY AND DEPRESSION: ICD-10-CM

## 2024-07-10 DIAGNOSIS — E66.9 OBESITY (BMI 30-39.9): ICD-10-CM

## 2024-07-10 DIAGNOSIS — F32.A ANXIETY AND DEPRESSION: ICD-10-CM

## 2024-07-10 DIAGNOSIS — E78.1 HYPERTRIGLYCERIDEMIA: Primary | ICD-10-CM

## 2024-07-10 PROBLEM — E78.5 DYSLIPIDEMIA: Status: ACTIVE | Noted: 2024-07-10

## 2024-07-10 PROBLEM — Z51.81 ENCOUNTER FOR THERAPEUTIC DRUG MONITORING: Status: ACTIVE | Noted: 2024-05-16

## 2024-07-10 PROCEDURE — 3008F BODY MASS INDEX DOCD: CPT | Performed by: NURSE PRACTITIONER

## 2024-07-10 PROCEDURE — 3074F SYST BP LT 130 MM HG: CPT | Performed by: NURSE PRACTITIONER

## 2024-07-10 PROCEDURE — 99204 OFFICE O/P NEW MOD 45 MIN: CPT | Performed by: NURSE PRACTITIONER

## 2024-07-10 PROCEDURE — 3079F DIAST BP 80-89 MM HG: CPT | Performed by: NURSE PRACTITIONER

## 2024-07-10 RX ORDER — DIETHYLPROPION HYDROCHLORIDE 75 MG/1
1 TABLET ORAL EVERY MORNING
Qty: 30 TABLET | Refills: 3 | Status: SHIPPED | OUTPATIENT
Start: 2024-07-10

## 2024-07-10 NOTE — PATIENT INSTRUCTIONS
A diet filled with a variety of vegetables, fruits, whole grains, beans, and other plant foods helps lower risk for many cancers. These include: apples, asparagus, blueberries, broccoli/crucifers, brussels sprouts, carrots, cauliflower, cherries, coffee, cranberries, flaxseed, garlic, grapefruit, grapes, kale, pulses (beans, peas, lentils), raspberries, *soy, spinach, squash, strawberries, tea tomatoes, walnuts, whole grains (AICR, 2020).      [employer].

## 2024-07-10 NOTE — PROGRESS NOTES
[facility] Consultation Note       Date of Consult:  7/10/2024    Patient:  Nikky Mckeon  :      2000  MRN:      [id number]    Referring Provider: Dr. Montes      Chief Complaint:    Chief Complaint   Patient presents with    Consult    Weight Management    Obesity       SUBJECTIVE     History of Present Illness:  Nikky Mckeon has been referred to me for evaluation and treatment.       25 yo female.   Presents to clinic for assistance with weight loss/maintenance.   Hx Ulcerative colitis Dx . Tx has led to weight gain along with pandemic.  Reports lexapro weight gain. She is tapering lexapro, switching to zoloft currently.     Patient is considering medications for weight loss.    Patient has history of eating disorder(s)- anorexia, but was taking Concerta and lacked appetite.     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  Patient lives with valencia.    Patient's goal weight: Healthy    Previous use of medical weight loss medications:    Physical activity: None    Sleep: interrupted hours/night    Sleep screening: night shift     Past Medical History:   Past Medical History:    ADHD    Asthma (HCC)    Generalized anxiety disorder    Internal hemorrhoids    Right knee meniscal tear    Tubular adenoma of colon    repeat CLN in     Ulcerative colitis (HCC)    on c-scope, minimal L sided disease       OBJECTIVE     Vitals: /80 (BP Location: Right arm, Patient Position: Sitting, Cuff Size: adult)   Pulse 95   Ht 5' 9.1\" (1.755 m)   Wt 258 lb (117 kg)   LMP  (LMP Unknown)   SpO2 96%   BMI 37.99 kg/m²        Wt Readings from Last 6 Encounters:   07/10/24 258 lb (117 kg)   24 259 lb (117.5 kg)   24 257 lb (116.6 kg)   03/15/24 254 lb (115.2 kg)   24 245 lb (111.1 kg)   23 247 lb 12.8 oz (112.4 kg)        Patient Medications:    Current Outpatient Medications   Medication Sig Dispense Refill    Diethylpropion HCl ER 75 MG Oral Tablet 24 Hr  Take 1 tablet (75 mg total) by mouth every morning. Start with 1/2 tablet daily, increase to full tablet daily as tolerated 30 tablet 3    escitalopram (LEXAPRO) 5 MG Oral Tab Take with 10 mg tab by mouth daily for 1 week, then decrease to 10 mg daily for 1 week, then decrease to 5 mg daily for 1 week, then stop 14 tablet 0    sertraline 50 MG Oral Tab Take 1/2 tablet (25 mg) by mouth daily for 1 week, then increase to 1 tab (50 mg) for 1 week, then 1.5 tabs (75 mg) daily for 1 week, then 2 tabs (100 mg) daily 30 tablet 0    buPROPion  MG Oral Tablet 24 Hr Take 1 tablet (150 mg total) by mouth in the morning and 1 tablet (150 mg total) before bedtime. 180 tablet 0    EPINEPHrine (EPIPEN 2-LAURA) 0.3 MG/0.3ML Injection Solution Auto-injector Inject 0.3 mL (1 each total) as directed as needed.      albuterol 108 (90 Base) MCG/ACT Inhalation Aero Soln Inhale 2 puffs into the lungs every 6 (six) hours as needed for Wheezing. 18 g 3    montelukast 10 MG Oral Tab Take 1 tablet (10 mg total) by mouth nightly. 90 tablet 3    Fluocinonide 0.05 % External Solution APPLY EXTERNALLY TO THE AFFECTED AREA AS NEEDED FOR ITCHING ON SCALP      Norethin Ace-Eth Estrad-FE (MICROGESTIN FE 1/20) 1-20 MG-MCG Oral Tab Take one by mouth daily. Patient may skip placebos. 84 tablet 3    ustekinumab 90 MG/ML Subcutaneous Solution Prefilled Syringe injection Inject 1 mL (90 mg total) into the skin every 8 weeks. First injection 8 weeks after the initial intravenous dose, then continue every 8 weeks thereafter 1 mL 11    escitalopram 20 MG Oral Tab Take 1 tablet (20 mg total) by mouth daily. 90 tablet 3       Allergies:  Chloraprep one step, Chlorhexidine, Methylprednisolone, Bananas, Ibuprofen, and Aluminum     Comorbidities:      Social History:  Reviewed    Surgical History:    Past Surgical History:   Procedure Laterality Date    Arthroscopy of joint unlisted      Colonoscopy N/A 08/19/2020    Procedure: COLONOSCOPY;  Surgeon: JESUS Correa  MD Joanna;  Location: [facility] ENDOSCOPY    Colonoscopy      Colonoscopy N/A 06/08/2022    Procedure: COLONOSCOPY;  Surgeon: JESUS Corera MD;  Location: [facility] ENDOSCOPY    Foot surgery Bilateral      x4 2018    Knee surgery Right     2015, 2017, 2018, 2019    Knee surgery Left 03/2023    [place] teeth removed         Family History:    Family History   Problem Relation Age of Onset    Anxiety Mother     Arthritis Mother     Other (Kidney stone) Mother     Heart Attack Father     Thyroid Cancer Sister 21    Other (Esophagitis) Brother         EOE    Fibromyalgia Maternal Grandmother     Other (SLE) Maternal Grandmother     Heart Attack Maternal Grandmother     Heart Attack Maternal Grandfather     No Known Problems Paternal Grandmother     Cancer Paternal Grandfather         Lung    Ovarian Cancer Neg     Colon Cancer Neg     Breast Cancer Neg        Typical Dietary Intake:  Breakfast AM Snack Lunch PM Snack Dinner   Night shift    Work day: sleeps    Off work: granola bar 11:30  Sleeping   Meat   Potatoes  Vegetables       After dinner behavior:   Night eating: night shift- 2-3 AM- left overs   Portion sizes: +  Binge: +  Emotional: +  Depression: +  Grazing: +  Sweet tooth:   Crunchy/salty: +  Etoh: social   Needs to improve water intake- drinks vitamin water  Soda Drinker: Yes  If yes, how much?:  0-1/day  Sports Drinks:  Yes    Juice:  No      Number of restaurant or fast food meals/week:  1 meals/week    Nutritional Goals Reviewed and Discussed:     Eat 3-4 cups of fresh fruit or vegetables daily    Behavior Modifications Reviewed and Discussed:    Eat breakfast, Eat 3 meals per day, Plan meals in advance, Read nutrition labels, Drink 64oz of water per day, Maintain a daily food journal, No drinking 30 minutes before or after meals, Utilize portion control strategies to reduce calorie intake, Identify triggers for eating and manage cues, and Eat slowly and take 20 to 30 minutes to complete each  meal      ROS:  Constitutional: negative  Respiratory: negative  Cardiovascular: negative  Gastrointestinal: negative  Integument/breast: negative  Hematologic/lymphatic: negative  Musculoskeletal:negative  Neurological: negative  Behavioral/Psych: negative  Endocrine: negative    Physical Exam:  General appearance: alert, appears stated age, cooperative and obese  Head: Normocephalic, without obvious abnormality, atraumatic  Neck: no adenopathy, no carotid bruit, no JVD, supple, symmetrical, trachea midline and thyroid not enlarged, symmetric, no tenderness/mass/nodules  Lungs: clear to auscultation bilaterally  Heart: S1, S2 normal, no murmur, click, rub or gallop, regular rate and rhythm  Abdomen: soft, non-tender; bowel sounds normal; no masses,  no organomegaly and abdomen obese   Extremities: intact, no edema   Pulses: 2+ and symmetric  Skin: intact   Neurologic: Grossly normal      ASSESSMENT         Encounter Diagnosis(ses):   1. Hypertriglyceridemia    2. Obesity (BMI 30-39.9)    3. Binge eating    4. Anxiety and depression        PLAN         Diagnoses and all orders for this visit:    Hypertriglyceridemia    Obesity (BMI 30-39.9)  -     Diethylpropion HCl ER 75 MG Oral Tablet 24 Hr; Take 1 tablet (75 mg total) by mouth every morning. Start with 1/2 tablet daily, increase to full tablet daily as tolerated  -     [facility] Referral - In Network  -     EKG 12 Lead; Future  -     DIETITIAN EDUCATION INITIAL, DIET (INTERNAL)    Binge eating  -     [facility] Referral - In Network  -     EKG 12 Lead; Future  -     DIETITIAN EDUCATION INITIAL, DIET (INTERNAL)    Anxiety and depression  -     [facility] Referral - In Network  -     EKG 12 Lead; Future  -     DIETITIAN EDUCATION INITIAL, DIET (INTERNAL)      Hypertriglyceridemia: Stable on the above prescribed meal plan and medication. Liver function stable.    Lab Results   Component Value Date/Time    CHOLEST 186 05/16/2024 11:23 AM    LDL 92 05/16/2024 11:23 AM     HDL 66 (H) 05/16/2024 11:23 AM    TRIG 162 (H) 05/16/2024 11:23 AM    VLDL 26 05/16/2024 11:23 AM       OBESITY/WEIGHT GAIN:    Recommended intensive lifestyle and behavioral modifications at this time for weight loss.    Educated patient on lifestyle modifications: Whole Food/Plant Strong/Low Glycemic Index diet, moderate alcohol consumption, reduced sodium intake to no more than 2,400 mg/day, and at least 150 minutes of moderate physical activity per week.   Avoid processed, poor quality carbohydrates, refined grains, flour, sugar.    Goals for next month:  1. Keep a food log.  2. Drink 64 ounces of non-caloric beverages per day. No fruit juices or regular soda.  3. Aim for 150 minutes moderate exercise per week.    4. Increase fruit and vegetable servings to 5-6 per day.    5. Improve sleep and stress.     Reviewed other labs:  5/16/24- vitamin d mildly low- supplement OTC daily.  TSH, a1c, CMP, CBC in range.     Discussed medication options for weight loss in detail with patient.     Sister hx papillary thyroid CA diagnosed age 21.    Continue wellbutrin.  Tapering lexapro. Switching to zoloft.     Denies hx cardiac disease or substance abuse.    Start diethylpropion 1/2 tablet of 75 mg by mouth in the AM.  Increase to full tablet daily as tolerated.    May try to conceive February 2025 after wedding. Monitor.     Discussed risks, benefits, rationale, and side effects of medication(s) including hypertension, palpitations, tachycardia, dizziness, constipation, dry mouth, and anxiety among others. Must avoid pregnancy during use, counseled on adequate contraception during use. Patient states understanding of all information and instructions.     Meet with [facility].  Meet with clinical psychologist.   Continue follow up with psychiatrist as recommended.      I spent 45 minutes preparing chart, obtaining/reviewing pertinent history, performing medically appropriate examination/evaluations,  counseling/educating on assessment and plan, ordering and reviewing tests/medications as needed, referring/communicating with other health care professionals as needed, documenting clinical information, interpreting results, communicating results, and/or coordinating patient care.     RTC 6 weeks virtual.     SHANNON Lizarraga

## 2024-07-12 ENCOUNTER — OFFICE VISIT (OUTPATIENT)
Dept: FAMILY MEDICINE CLINIC | Facility: CLINIC | Age: 24
End: 2024-07-12
Payer: COMMERCIAL

## 2024-07-12 VITALS
TEMPERATURE: 98 F | BODY MASS INDEX: 38.21 KG/M2 | SYSTOLIC BLOOD PRESSURE: 138 MMHG | RESPIRATION RATE: 16 BRPM | DIASTOLIC BLOOD PRESSURE: 90 MMHG | HEIGHT: 69 IN | OXYGEN SATURATION: 98 % | HEART RATE: 96 BPM | WEIGHT: 258 LBS

## 2024-07-12 DIAGNOSIS — F32.A ANXIETY AND DEPRESSION: ICD-10-CM

## 2024-07-12 DIAGNOSIS — R30.0 DYSURIA: Primary | ICD-10-CM

## 2024-07-12 DIAGNOSIS — F41.9 ANXIETY AND DEPRESSION: ICD-10-CM

## 2024-07-12 LAB
APPEARANCE: CLEAR
BILIRUBIN: NEGATIVE
GLUCOSE (URINE DIPSTICK): NEGATIVE MG/DL
KETONES (URINE DIPSTICK): NEGATIVE MG/DL
LEUKOCYTES: NEGATIVE
MULTISTIX LOT#: ABNORMAL NUMERIC
NITRITE, URINE: NEGATIVE
PH, URINE: 6 (ref 4.5–8)
PROTEIN (URINE DIPSTICK): NEGATIVE MG/DL
SPECIFIC GRAVITY: 1.03 (ref 1–1.03)
URINE-COLOR: YELLOW
UROBILINOGEN,SEMI-QN: 0.2 MG/DL (ref 0–1.9)

## 2024-07-12 PROCEDURE — 99213 OFFICE O/P EST LOW 20 MIN: CPT | Performed by: NURSE PRACTITIONER

## 2024-07-12 PROCEDURE — 3008F BODY MASS INDEX DOCD: CPT | Performed by: NURSE PRACTITIONER

## 2024-07-12 PROCEDURE — 3080F DIAST BP >= 90 MM HG: CPT | Performed by: NURSE PRACTITIONER

## 2024-07-12 PROCEDURE — 3075F SYST BP GE 130 - 139MM HG: CPT | Performed by: NURSE PRACTITIONER

## 2024-07-12 PROCEDURE — 81003 URINALYSIS AUTO W/O SCOPE: CPT | Performed by: NURSE PRACTITIONER

## 2024-07-12 PROCEDURE — 87086 URINE CULTURE/COLONY COUNT: CPT | Performed by: NURSE PRACTITIONER

## 2024-07-12 RX ORDER — NITROFURANTOIN 25; 75 MG/1; MG/1
100 CAPSULE ORAL 2 TIMES DAILY
Qty: 14 CAPSULE | Refills: 0 | Status: SHIPPED | OUTPATIENT
Start: 2024-07-12 | End: 2024-07-19

## 2024-07-12 NOTE — PROGRESS NOTES
CHIEF COMPLAINT:     Chief Complaint   Patient presents with    UTI       HPI:   Nikky Mckeon is a 24 year old female who presents with symptoms of UTI. Complaining of urinary frequency, urgency, suprapubic cramping at urination, and dysuria for last 6 days. Treatments tried: None.   Denies moderate to severe abdominal pain, flank pain, fever, hematuria, nausea, or vomiting.  Denies vaginal discharge or itching, denies risk/concern for STD.      Denies recurrent UTI (last was about a year or so ago) or Hx of stones.  Denies recent hospitalization or antibiotic use.  Denies pregnancy/nursing, on continuous OCP.    Also requesting very short term refill on her sertraline (takes 100 mg total, 2 50 mg tablets right now).  She is concerned because she is running out and she is leaving on vacation tomorrow.  Tried to call her psychiatry office but they have not gotten back to her.  Denies changes in her depression/anxiety symptoms, she is stable at this time.  No SI/HI.    Current Outpatient Medications   Medication Sig Dispense Refill    sertraline 50 MG Oral Tab Take 2 tablets PO daily 6 tablet 0           Diethylpropion HCl ER 75 MG Oral Tablet 24 Hr Take 1 tablet (75 mg total) by mouth every morning. Start with 1/2 tablet daily, increase to full tablet daily as tolerated 30 tablet 3    sertraline 50 MG Oral Tab Take 1/2 tablet (25 mg) by mouth daily for 1 week, then increase to 1 tab (50 mg) for 1 week, then 1.5 tabs (75 mg) daily for 1 week, then 2 tabs (100 mg) daily 30 tablet 0    buPROPion  MG Oral Tablet 24 Hr Take 1 tablet (150 mg total) by mouth in the morning and 1 tablet (150 mg total) before bedtime. 180 tablet 0    montelukast 10 MG Oral Tab Take 1 tablet (10 mg total) by mouth nightly. 90 tablet 3    Norethin Ace-Eth Estrad-FE (MICROGESTIN FE 1/20) 1-20 MG-MCG Oral Tab Take one by mouth daily. Patient may skip placebos. 84 tablet 3    ustekinumab 90 MG/ML Subcutaneous Solution Prefilled  Syringe injection Inject 1 mL (90 mg total) into the skin every 8 weeks. First injection 8 weeks after the initial intravenous dose, then continue every 8 weeks thereafter 1 mL 11    escitalopram (LEXAPRO) 5 MG Oral Tab Take with 10 mg tab by mouth daily for 1 week, then decrease to 10 mg daily for 1 week, then decrease to 5 mg daily for 1 week, then stop (Patient not taking: Reported on 7/12/2024) 14 tablet 0    EPINEPHrine (EPIPEN 2-LAURA) 0.3 MG/0.3ML Injection Solution Auto-injector Inject 0.3 mL (1 each total) as directed as needed.      albuterol 108 (90 Base) MCG/ACT Inhalation Aero Soln Inhale 2 puffs into the lungs every 6 (six) hours as needed for Wheezing. (Patient not taking: Reported on 7/12/2024) 18 g 3    Fluocinonide 0.05 % External Solution APPLY EXTERNALLY TO THE AFFECTED AREA AS NEEDED FOR ITCHING ON SCALP      escitalopram 20 MG Oral Tab Take 1 tablet (20 mg total) by mouth daily. (Patient not taking: Reported on 7/12/2024) 90 tablet 3      Past Medical History:    ADHD    Asthma (HCC)    Generalized anxiety disorder    Internal hemorrhoids    Right knee meniscal tear    Tubular adenoma of colon    repeat CLN in 2024    Ulcerative colitis (HCC)    on c-scope, minimal L sided disease      Social History:  Social History     Socioeconomic History    Marital status: Single   Occupational History    Occupation: Nurse   Tobacco Use    Smoking status: Never    Smokeless tobacco: Never   Vaping Use    Vaping status: Former    Substances: Nicotine   Substance and Sexual Activity    Alcohol use: Yes     Comment: twice a month - socially    Drug use: Never    Sexual activity: Yes     Partners: Male     Birth control/protection: Pill   Other Topics Concern    Pt has a pacemaker No    Pt has a defibrillator No    Reaction to local anesthetic No    Blood Transfusions No   Social History Narrative    Relationships: Mother - Yeimi.* Alexandre Baum - Getting  in February 2025    Children: None    Pets: Dog     School: N/A    Work: Nurse - [employer]    Origin: Grew up in [place].     Interests: Enjoys arts - croquet, doing nails    Spiritual: Not Quaker, not spiritual      Social Determinants of Health      [facility]    Social Connections         REVIEW OF SYSTEMS:   GENERAL: Denies fever, chills, or body aches  SKIN: no rashes, no skin wounds or ulcers.  EYES:denies blurred vision or double vision  HEENT: no congestion, rhinorrhea, sore throat or ear pain  CARDIOVASCULAR: denies chest pain or palpitations  LUNGS: denies shortness of breath, cough, or wheezing  GI: See HPI. No N/V/C/D.   : See HPI.  NEURO: no headaches.    EXAM:   /90   Pulse 96   Temp 98.1 °F (36.7 °C) (Tympanic)   Resp 16   Ht 5' 9\" (1.753 m)   Wt 258 lb (117 kg)   LMP  (LMP Unknown)   SpO2 98%   BMI 38.10 kg/m²   GENERAL: Well-appearing, well developed, well nourished, and in no apparent distress.  Appropriate speech and affect.  CARDIO: RRR, no murmurs  LUNGS: clear to ausculation bilaterally, no wheezing or rhonchi  GI: BS present x 4 and normoactive.  No hepatosplenomegaly.  : No suprapubic tenderness, No bladder distention or CVAT.    Recent Results (from the past 24 hour(s))   URINALYSIS, AUTO, W/O SCOPE    Collection Time: 07/12/24  2:56 PM   Result Value Ref Range    Glucose Urine Negative Negative mg/dL    Bilirubin Urine Negative Negative    Ketones, UA Negative Negative - Trace mg/dL    Spec Gravity 1.030 1.005 - 1.030    Blood Urine Moderate (A) Negative    PH Urine 6.0 5.0 - 8.0    Protein Urine Negative Negative - Trace mg/dL    Urobilinogen Urine 0.2 0.2 - 1.0 mg/dL    Nitrite Urine Negative Negative    Leukocyte Esterase Urine Negative Negative    APPEARANCE Clear Clear    Color Urine Yellow Yellow    Multistix Lot# [id number] Numeric    Multistix Expiration Date 5/31/25 Date         ASSESSMENT AND PLAN:   Nikky Mckeon is a 24 year  old female presents with UTI symptoms.    ASSESSMENT:  Encounter Diagnoses   Name Primary?    Dysuria Yes    Anxiety and depression        PLAN:   - Dipstick with moderate blood.  No symptoms suggestive of other renal or vaginal cause at this time.  Given pt is leaving on vacation and with classic symtpoms, will start antibiotic Rx.  Pt aware to discontinue Rx if negative urine culture.  - Will send urine culture and contact pt with results.  - Meds as listed below.    - Very short refill sent of sertraline per pt request- she is running out and needs like 1 or 2 days worth.  Has appt with her psychiatrist upon returning from vacation.  Symptoms stable, tolerating well.  - Comfort measures as described in Patient Instructions.    - Advised F/U visit if no improvement/worsening within 2-3 days.    - Advised to proceed to ER if ever moderate to severe abdominal/flank pain or new fevers.  - Pt verbalizes understanding and is agreeable w/ plan.    Meds & Refills for this Visit:  Requested Prescriptions     Signed Prescriptions Disp Refills    sertraline 50 MG Oral Tab 6 tablet 0     Sig: Take 2 tablets PO daily    nitrofurantoin monohydrate macro 100 MG Oral Cap 14 capsule 0     Sig: Take 1 capsule (100 mg total) by mouth 2 (two) times daily for 7 days.       Risk and benefits of medication discussed. Stressed importance of completing full course of antibiotic. Pt verbalizes understanding.    There are no Patient Instructions on file for this visit.

## 2024-07-24 ENCOUNTER — APPOINTMENT (OUTPATIENT)
Dept: URBAN - METROPOLITAN AREA CLINIC 244 | Age: 24
Setting detail: DERMATOLOGY
End: 2024-07-25

## 2024-07-24 DIAGNOSIS — L72.0 EPIDERMAL CYST: ICD-10-CM

## 2024-07-24 PROCEDURE — OTHER COUNSELING: OTHER

## 2024-07-24 PROCEDURE — OTHER INTRALESIONAL KENALOG: OTHER

## 2024-07-24 PROCEDURE — 11900 INJECT SKIN LESIONS </W 7: CPT

## 2024-07-24 ASSESSMENT — LOCATION SIMPLE DESCRIPTION DERM
LOCATION SIMPLE: RIGHT TEMPLE
LOCATION SIMPLE: RIGHT FOREHEAD

## 2024-07-24 ASSESSMENT — LOCATION DETAILED DESCRIPTION DERM
LOCATION DETAILED: RIGHT SUPERIOR TEMPLE
LOCATION DETAILED: RIGHT INFERIOR LATERAL FOREHEAD

## 2024-07-24 ASSESSMENT — LOCATION ZONE DERM: LOCATION ZONE: FACE

## 2024-07-24 NOTE — PROCEDURE: INTRALESIONAL KENALOG
Validate Note Data When Using Inventory: Yes
Detail Level: Simple
Total Volume (Ccs): 0.03
How Many Mls Were Removed From The 80 Mg/Ml (5ml) Vial When Preparing The Injectable Solution?: 0
Medical Necessity Clause: This procedure was medically necessary because the lesions that were treated were:
Administered By (Optional): MONET
Consent: The risks of atrophy, especially the risk of a divot, dyspigmentation, scarring, bleeding, no improvement, among others were reviewed with the patient.
Lot # For Kenalog (Optional): [id number]
Kenalog Type Of Vial: Multiple Dose
Expiration Date For Kenalog (Optional): Dec 2025
Require Ndc Code?: No
Show Inventory Tab: Hide
Concentration Of Kenalog Solution Injected (Mg/Ml): 5.0
Ndc# For Kenalog Only: 4153-9055-66
Kenalog Preparation: Kenalog

## 2024-07-29 NOTE — PROGRESS NOTES
Spoke to patient at length. Advised patient to stop diethylpropion immediately, she may be experiencing serotonin syndrome with zoloft, wellbutrin, diethylpropion combination. She states she stopped the diethylpropion medication 3 days ago and is already feeling better. Explained she should remain off diethylpropion at this time. Patient prefers to keep follow up appointments for weight loss support.

## 2024-07-30 ENCOUNTER — MED REC SCAN ONLY (OUTPATIENT)
Dept: INTERNAL MEDICINE CLINIC | Facility: CLINIC | Age: 24
End: 2024-07-30

## 2024-07-31 NOTE — PROGRESS NOTES
INITIAL OUTPATIENT NUTRITION CONSULTATION    Nutrition Assessment    Medical Diagnosis: Obesity  Client Age and Gender: 24 year old female    Labs:   No components found for: \"HGBA1C\"  Triglycerides   Date Value Ref Range Status   05/16/2024 162 (H) 30 - 149 mg/dL Final     Comment:     Reference interval for fasting triglycerides  Desirable: <150 mg/dL  Borderline: 150-199 mg/dL  High: 200-499 mg/dL  Very High: >=500 mg/dL           LDL Cholesterol   Date Value Ref Range Status   05/16/2024 92 <100 mg/dL Final     Comment:     Optimal            <100 mg/dL   Near/Above OptimaL 100-129 mg/dL   Borderline High    130-159 mg/dL   High               160-189 mg/dL    Very High          >190 mg/dL          HDL Cholesterol   Date Value Ref Range Status   05/16/2024 66 (H) 40 - 59 mg/dL Final     Comment:     Interpretive Information:   An HDL cholesterol <40 mg/dL is low and constitutes a coronary heart disease risk factor. An HDL cholesterol >60 mg/dL is a negative risk factor for coronary heart disease.         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 Today is her initial visit with the [facility] RD.  Would like to feel healthy, she . Today's Ht 5' 9.06\" (1.754 m)   Wt 255 lb (115.7 kg)   LMP  (LMP Unknown)   BMI 37.60 kg/m²  indicating a 3 lbs weight loss since stating the [facility].     Wt Readings from Last 6 Encounters:   07/12/24 258 lb (117 kg)   07/10/24 258 lb (117 kg)   05/16/24 259 lb (117.5 kg)   04/18/24 257 lb (116.6 kg)    03/15/24 254 lb (115.2 kg)   01/08/24 245 lb (111.1 kg)       Diet History:  Previous Attempts at Weight Loss: none    Patient seen in clinic today for nutrition counseling to assist with weight loss. Patient is not interested in bariatric surgery. Reports certain challenges/barriers associated with weight loss. Specifically reports she has gained 70 lbs since COVID. She was dx with UC in 2020 and was put on steroids. She takes antibiotics at least 3 x year r/t UTI or sinus infections. I provided her with coupons for probiotic and a sample of Zenpep. If she gets relief with the enzymes she will request a script from Paty Dickens NP. In  she was anorexic and now struggles with binge eating. She is scheduled to see Dr. Hays. She snacks when bored. She frequently skips breakfast, generally will only eat breakfast 1-2 x month. Discussed importance of getting 3 meals/day for adequate nutrition, focusing on at least 3-4 oz protein/meal. Reviewed eating slower (20-30 minutes/meal) . She has struggled with her weight for most of her life. Upon medication review discussed sertraline (Zoloft) which can contribute to weight gain by affecting brain chemicals and how the body breaks down calories, it may also prevent feeling full while eating resulting in larger portion sizes and overeating. Discussed typical diet which consists of 2-3 meals and 0-1 snacks/day. She is not currently tracking her meals and finds it to be triggering for her disordered eating. Patient was encouraged to start tracking dietary intake using an lucia at initial visit. She is aiming for <100 grams CHO/day. She is not eating meals at consistent times. She continues on bupropion for depression but it may help with cravings. No adverse SE reported. She is responsible for grocery shopping and cooking. She is eating out 1 x week. No food log provided; verbal dietary recall listed below.    Typical Diet:  Breakfast: greek yogurt with granola bar OR  protein bar OR SKIPS  Snack: N/A  Lunch: leftovers   Snack: granola bars OR gold fish OR chips OR crackers  Dinner: meat + vegetable + starch OR steak, potatoes and peas and carrots OR roast peperoncino with onions and carrots over bread or rice  Snack: granola bars OR gold fish OR chips OR crackers    Beverages: water, diet coke, Celisus   Alcoholic Beverages: 1-2 x month    Exercise:  No structured exercise routine at this time.    Intervention   Education:  Review of Food Intake (pt provided food log/journal - no)  Importance of keeping a food log  Discussion of food modification to current diet (see GOALS)  Discussed ideas for breakfast, lunch, dinner, snacks  Basic Nutrition Education  Discussion of food groups and what constitutes a serving  Discussion of number of servings in each food group to have per meal or snack  Discussed use of high fiber vs refined carbs; use of lowfat proteins; saturated vs unsaturated fats  Importance of eating consistently and not skipping meals  Importance of protein for satiety  Importance of meal planning  Discussion of need for exercise for weight loss (see GOALS)    Handouts Provided: Healthy Plate, Free Foods List, Protein Supplement Suggestions, Portion Control Tips, List of Carbohydrate, Protein, and Fat Sources.    Goals:   Keep a food record, My Net Diary, select the [employer] dashboard.  Focus on healthy plate; 1/2 plate vegetables, 1/4 plate protein, 1/4 plate carbohydrates  Start each meal with protein first  Aim for 120 grams protein/day or 20-30 grams protein/meal  Quick convenient protein options: tuna, deli meat (turkey, chicken, roast beef), pre-cooked shrimp or chicken, greek yogurt, cottage cheese, protein shakes  When choosing yogurt aim for option consisting of 10-15 grams protein and  calories/serving  High protein yogurt examples: Siggi;s, Cypriot, Two Good, Oikos Triple Zero, Dannon light and fit greek, YQ by YoplCharlie almonte, Chobani Less Sugar  Aim for  <100 grams carbohydrates/day   Aim for 64 oz of water/day  Meal plan ahead of time  Use measuring cups and/or food scale for portion control  Start probiotic: VSL #3 ([facility])  Exercise with a goal of 30 minutes per day for exercise (for example,walking).    Strength training at least 10 minutes 3 days per week.    Exercise:  Stay active - focus on realistic activities that fit into your schedule  Plan ahead of exercise - treat as an appointment  When getting started with exercise, slowly build up duration and intensity  Aim for 30 minutes or more of moderate to vigorous exercise 5 x week  Incorporate strength training 3 x week for at least 10 minutes    At Home Fitness Suggestions:  Amazon Prime (free with Amazon Prime subscription)  15 minute full body burn with Cecilia Darrick  15 minutes HIIT with Cecilia Darrick  Libby - Less Libby  Dance Fit Cardio Blast  Dance That Walk  Kristen Super Cardio Dance Party    Exercise Programs:  7 minute workout by Yusef Patrick on Demand  Daily Burn  FitOn  Obe Fitness  Om Fit    Yoga Programs:  Slim Moves  Bull Dog online yoga  CorePower yoga  Start Stretching    YouTube Exercise Options:  Fitness   Pahla B Fitness (weight loss over 50)  PopsuMavin fitness  Walk at Home by Juliet Mckeon  Yoga with Michelle  Fitness with Nickie    Band Exercises:  Start low resistance beginner exercises using bands.  Here are some YouTube band exercise videos you could try:  www.Favoe.com/watch?v=Tn6HduEJE2Z  www.Favoe.com/watch?v=wvnuuRKbsLs    Behavior Modification:  Set small goals for self and focus on realistic, attainable changes to work on long-term.  Start probiotic: VSL #3 ([facility])  Exercise with a goal of 30 minutes per day for exercise (for example,walking).    Strength training at least 10 minutes 3 days per week.    Monitoring/Evaluation:  Patient encouraged to schedule follow up appt      JAGDEEP Real RDN  Visit Length: 11:32: am to 12:45 pm - 73 minutes

## 2024-08-01 ENCOUNTER — OFFICE VISIT (OUTPATIENT)
Dept: SURGERY | Facility: CLINIC | Age: 24
End: 2024-08-01
Payer: COMMERCIAL

## 2024-08-01 VITALS — BODY MASS INDEX: 37.77 KG/M2 | HEIGHT: 69.06 IN | WEIGHT: 255 LBS

## 2024-08-01 DIAGNOSIS — E66.9 OBESITY (BMI 30-39.9): Primary | ICD-10-CM

## 2024-08-01 DIAGNOSIS — R63.2 BINGE EATING: ICD-10-CM

## 2024-08-01 DIAGNOSIS — E78.1 HYPERTRIGLYCERIDEMIA: ICD-10-CM

## 2024-08-01 PROCEDURE — 97802 MEDICAL NUTRITION INDIV IN: CPT | Performed by: DIETITIAN, REGISTERED

## 2024-08-01 PROCEDURE — 3008F BODY MASS INDEX DOCD: CPT | Performed by: DIETITIAN, REGISTERED

## 2024-08-01 NOTE — PATIENT INSTRUCTIONS
It was so nice meeting you today. Please reach out with any questions or concerns. Thank you for including me in your weight loss journey. Here's a review of today's visit:    Goals:   Keep a food record, My Net Diary, select the macros dashboard.  Focus on healthy plate; 1/2 plate vegetables, 1/4 plate protein, 1/4 plate carbohydrates  Start each meal with protein first  Aim for 120 grams protein/day or 20-30 grams protein/meal  Quick convenient protein options: tuna, deli meat (turkey, chicken, roast beef), pre-cooked shrimp or chicken, greek yogurt, cottage cheese, protein shakes  When choosing yogurt aim for option consisting of 10-15 grams protein and  calories/serving  High protein yogurt examples: Siggi;s, Tristanian, Two Good, Oikos Triple Zero, Dannon light and fit greek, YQ by Yoplsuzy, Fage, Chobani Less Sugar  Aim for <100 grams carbohydrates/day   Aim for 64 oz of water/day  Meal plan ahead of time  Use measuring cups and/or food scale for portion control  Start probiotic: VSL #3 ([facility])  Exercise with a goal of 30 minutes per day for exercise (for example,walking).    Strength training at least 10 minutes 3 days per week.    Exercise:  Stay active - focus on realistic activities that fit into your schedule  Plan ahead of exercise - treat as an appointment  When getting started with exercise, slowly build up duration and intensity  Aim for 30 minutes or more of moderate to vigorous exercise 5 x week  Incorporate strength training 3 x week for at least 10 minutes    At Home Fitness Suggestions:  Amazon Prime (free with Amazon Prime subscription)  15 minute full body burn with Cecilia Darrick  15 minutes HIIT with Cecilia Darrick  [place] - Less [place]  Dance Fit Cardio Blast  Dance That Walk  Kristen Super Cardio Dance Party    Exercise Programs:  7 minute workout by Yusef Patrick on Demand  Daily Burn  FitOn  Obe Fitness  Om Fit    Yoga Programs:  Slim Moves  Bull Dog online yoga  CorePower  yoga  Start Stretching    YouTube Exercise Options:  Fitness   Best YANES Fitness (weight loss over 50)  [facility] fitness  Walk at Home by Juliet Melton with Michelle  Fitness with Nickie    Band Exercises:  Start low resistance beginner exercises using bands.  Here are some YouTube band exercise videos you could try:  www.Comparisim.com/watch?v=If4TvhRUW6Y  www.Comparisim.com/watch?v=wvnuuRKbsLs    Behavior Modification:  Set small goals for self and focus on realistic, attainable changes to work on long-term.  Start probiotic: VSL #3 ([facility])  Exercise with a goal of 30 minutes per day for exercise (for example,walking).    Strength training at least 10 minutes 3 days per week.

## 2024-08-14 DIAGNOSIS — Z30.41 ENCOUNTER FOR SURVEILLANCE OF CONTRACEPTIVE PILLS: ICD-10-CM

## 2024-08-14 RX ORDER — NORETHINDRONE ACETATE AND ETHINYL ESTRADIOL 1MG-20(21)
1 KIT ORAL DAILY
Qty: 84 TABLET | Refills: 2 | Status: SHIPPED | OUTPATIENT
Start: 2024-08-14

## 2024-08-15 RX ORDER — NORETHINDRONE ACETATE AND ETHINYL ESTRADIOL 1MG-20(21)
1 KIT ORAL DAILY
Qty: 84 TABLET | Refills: 2 | OUTPATIENT
Start: 2024-08-15

## 2024-09-04 ENCOUNTER — OFFICE VISIT (OUTPATIENT)
Dept: FAMILY MEDICINE CLINIC | Facility: CLINIC | Age: 24
End: 2024-09-04
Payer: COMMERCIAL

## 2024-09-04 VITALS
WEIGHT: 255 LBS | DIASTOLIC BLOOD PRESSURE: 80 MMHG | TEMPERATURE: 98 F | HEIGHT: 69 IN | SYSTOLIC BLOOD PRESSURE: 119 MMHG | RESPIRATION RATE: 18 BRPM | OXYGEN SATURATION: 97 % | BODY MASS INDEX: 37.77 KG/M2 | HEART RATE: 79 BPM

## 2024-09-04 DIAGNOSIS — J01.90 ACUTE SINUSITIS, RECURRENCE NOT SPECIFIED, UNSPECIFIED LOCATION: Primary | ICD-10-CM

## 2024-09-04 DIAGNOSIS — E66.9 OBESITY (BMI 30-39.9): Primary | ICD-10-CM

## 2024-09-04 PROCEDURE — 3008F BODY MASS INDEX DOCD: CPT | Performed by: NURSE PRACTITIONER

## 2024-09-04 PROCEDURE — 3074F SYST BP LT 130 MM HG: CPT | Performed by: NURSE PRACTITIONER

## 2024-09-04 PROCEDURE — 3079F DIAST BP 80-89 MM HG: CPT | Performed by: NURSE PRACTITIONER

## 2024-09-04 PROCEDURE — 99213 OFFICE O/P EST LOW 20 MIN: CPT | Performed by: NURSE PRACTITIONER

## 2024-09-04 RX ORDER — TOPIRAMATE 25 MG/1
25 TABLET, FILM COATED ORAL 2 TIMES DAILY
Qty: 60 TABLET | Refills: 3 | Status: SHIPPED | OUTPATIENT
Start: 2024-09-04

## 2024-09-04 NOTE — PROGRESS NOTES
CHIEF COMPLAINT:     Chief Complaint   Patient presents with    Sinus Problem       HPI:   Nikky Mckeon is a 24 year old female who presents for cold symptoms for  3  weeks. Symptoms have progressed into sinus congestion and been worsening since onset. Sinus congestion/pain is described as a pressure and is located mainly maxillary.  Reports green nasal discharge.  nothing makes symptoms better. Has treated symptoms with Augmentin.  Patient also reports intermittent headache, cough, fullness in ears, sinus pain.  Denies fever, tinnitus, N/V/D.      Pt reported went to another urgent care and was diagnosed with sinusitis. Rx - given Augmentin. Took last dose this AM. Symptoms did improved, reporting contined PND and sinus pain. Did report initial teeth sensitivity.       Current Outpatient Medications   Medication Sig Dispense Refill    topiramate 25 MG Oral Tab Take 1 tablet (25 mg total) by mouth 2 (two) times daily. (Patient not taking: Reported on 9/4/2024) 60 tablet 3    amoxicillin clavulanate 875-125 MG Oral Tab Take 1 tablet by mouth 2 (two) times daily for 3 days. 6 tablet 0    buPROPion  MG Oral Tablet 24 Hr Take 1 tablet (150 mg total) by mouth daily.      sertraline 50 MG Oral Tab Take 1 tablet (50 mg total) by mouth daily. 30 tablet 0    Norethin Ace-Eth Estrad-FE (BLISOVI FE 1/20) 1-20 MG-MCG Oral Tab Take 1 tablet by mouth daily. SKIP PLACEBOS 84 tablet 2    EPINEPHrine (EPIPEN 2-LAURA) 0.3 MG/0.3ML Injection Solution Auto-injector Inject 0.3 mL (1 each total) as directed as needed.      albuterol 108 (90 Base) MCG/ACT Inhalation Aero Soln Inhale 2 puffs into the lungs every 6 (six) hours as needed for Wheezing. 18 g 3    montelukast 10 MG Oral Tab Take 1 tablet (10 mg total) by mouth nightly. 90 tablet 3    ustekinumab 90 MG/ML Subcutaneous Solution Prefilled Syringe injection Inject 1 mL (90 mg total) into the skin every 8 weeks. First injection 8 weeks after the initial intravenous  dose, then continue every 8 weeks thereafter 1 mL 11    Fluocinonide 0.05 % External Solution APPLY EXTERNALLY TO THE AFFECTED AREA AS NEEDED FOR ITCHING ON SCALP        Past Medical History:    ADHD    Asthma (HCC)    Generalized anxiety disorder    Internal hemorrhoids    Right knee meniscal tear    Tubular adenoma of colon    repeat CLN in 2024    Ulcerative colitis (HCC)    on c-scope, minimal L sided disease      Past Surgical History:   Procedure Laterality Date    Arthroscopy of joint unlisted      Colonoscopy N/A 08/19/2020    Procedure: COLONOSCOPY;  Surgeon: JESUS Correa MD;  Location: [facility] ENDOSCOPY    Colonoscopy      Colonoscopy N/A 06/08/2022    Procedure: COLONOSCOPY;  Surgeon: JESUS Correa MD;  Location: [facility] ENDOSCOPY    Foot surgery Bilateral      x4 2018    Knee surgery Right     2015, 2017, 2018, 2019    Knee surgery Left 03/2023    [place] teeth removed        Family History   Problem Relation Age of Onset    Anxiety Mother     Arthritis Mother     Other (Kidney stone) Mother     Heart Attack Father     Thyroid Cancer Sister 21    Other (Esophagitis) Brother         EOE    Fibromyalgia Maternal Grandmother     Other (SLE) Maternal Grandmother     Heart Attack Maternal Grandmother     Heart Attack Maternal Grandfather     No Known Problems Paternal Grandmother     Cancer Paternal Grandfather         Lung    Ovarian Cancer Neg     Colon Cancer Neg     Breast Cancer Neg       Social History     Socioeconomic History    Marital status: Single   Occupational History    Occupation: Nurse   Tobacco Use    Smoking status: Never    Smokeless tobacco: Never   Vaping Use    Vaping status: Former    Substances: Nicotine   Substance and Sexual Activity    Alcohol use: Yes     Comment: twice a month - socially    Drug use: Never    Sexual activity: Yes     Partners: Male     Birth control/protection: Pill   Other Topics Concern    Pt has a pacemaker No    Pt has a defibrillator No    Reaction to local  anesthetic No    Blood Transfusions No   Social History Narrative    Relationships: Mother - Yeimi.* Justo- Bautista - Getting  in February 2025    Children: None    Pets: Dog    School: N/A    Work: Nurse - [employer]    Origin: Grew up in [place] [facility] [facility].     Interests: Enjoys arts - [employer]ane [employer], doing nails    Spiritual: Not Anabaptist, not spiritual      Social Determinants of Health      [facility]    Social Connections         REVIEW OF SYSTEMS:   GENERAL:  good appetite  SKIN: no rashes or abnormal skin lesions  HEENT: See HPI.    LUNGS: denies shortness of breath or wheezing, See HPI  CARDIOVASCULAR: denies chest pain or palpitations   GI: denies N/V/C or abdominal pain  NEURO: + sinus headaches.  No numbness or tingling in face.    EXAM:   /80 (BP Location: Right arm, Patient Position: Sitting, Cuff Size: adult)   Pulse 79   Temp 97.7 °F (36.5 °C)   Resp 18   Ht 5' 9\" (1.753 m)   Wt 255 lb (115.7 kg)   LMP  (LMP Unknown)   SpO2 97%   BMI 37.66 kg/m²   GENERAL: well developed, well nourished,in no apparent distress  SKIN: no rashes,no suspicious lesions  HEAD: atraumatic, normocephalic, +  tenderness on palpation of maxillary sinuses  EYES: conjunctiva clear, EOM intact  EARS: TM's gray, no  bulging, no  retraction, no  fluid, bony landmarks visible  NOSE: nostrils patent, green nasal mucous, nasal mucosa reddened and inflamed  THROAT: oral mucosa pink, moist. No visible dental caries. Posterior pharynx is not erythematous. no exudates. PND+  NECK: supple, non-tender  LUNGS: clear to auscultation bilaterally, no wheezes or rhonchi. Breathing is non labored.  CARDIO: RRR without murmur  EXTREMITIES: no cyanosis, clubbing or edema  LYMPH:  no lymphadenopathy.        ASSESSMENT AND PLAN:   Nikky Mckeon is a 24 year old female who presents with Sinus Problem. Symptoms are consistent with:      ASSESSMENT:  Encounter  Diagnosis   Name Primary?    Acute sinusitis, recurrence not specified, unspecified location Yes         PLAN: Meds as below.  Comfort care instructions as listed in Patient Instructions    Meds & Refills for this Visit:  Requested Prescriptions     Signed Prescriptions Disp Refills    amoxicillin clavulanate 875-125 MG Oral Tab 6 tablet 0     Sig: Take 1 tablet by mouth 2 (two) times daily for 3 days.     Will extend Augmentin by three days for a total of 10 days of treatment.     Discussed s/s of worsening infection/condition with Patient and importance of prompt medical re-evaluation including when to seek emergency care. Patient  voiced understanding    Increase fluids and rest. Warm compress to face multiple times per day.     May consider OTC tylenol as needed and directed on packaging for pain/fever    May consider OTC guaifenesin as needed and directed on packaging to thin mucus secretions.    May consider OTC  pseudoephedrine as needed and directed on packaging as a nasal decongestant    Risks, benefits, and side effects of medication discussed. Patient  verbalized understanding and agreement with treatment plan.     All questions and concerns addressed. Encouraged Patient  to call clinic with any questions or concerns. I explained to the patient that emergent conditions may arise and to go to the ER for new, worsening or any persistent conditions.    Patient Instructions   See attached patient care instructions.      The patient indicates understanding of these issues and agrees to the plan.  The patient is asked to return if sx's persist or worsen.

## 2024-10-07 ENCOUNTER — OFFICE VISIT (OUTPATIENT)
Dept: INTERNAL MEDICINE CLINIC | Facility: CLINIC | Age: 24
End: 2024-10-07
Payer: COMMERCIAL

## 2024-10-07 VITALS
HEIGHT: 69 IN | WEIGHT: 253.63 LBS | SYSTOLIC BLOOD PRESSURE: 108 MMHG | DIASTOLIC BLOOD PRESSURE: 74 MMHG | HEART RATE: 84 BPM | OXYGEN SATURATION: 98 % | BODY MASS INDEX: 37.56 KG/M2 | TEMPERATURE: 98 F

## 2024-10-07 DIAGNOSIS — J01.01 ACUTE RECURRENT MAXILLARY SINUSITIS: Primary | ICD-10-CM

## 2024-10-07 DIAGNOSIS — J30.2 SEASONAL ALLERGIES: ICD-10-CM

## 2024-10-07 RX ORDER — FLUTICASONE PROPIONATE 50 MCG
1 SPRAY, SUSPENSION (ML) NASAL DAILY
COMMUNITY
Start: 2024-09-14

## 2024-10-08 NOTE — PROGRESS NOTES
CHIEF COMPLAINT:     Chief Complaint   Patient presents with    Sinus Problem     Sinus infection since August 2024, worsening.   Seen [facility] 09/04/2024      HPI:   Nikky Mckeon is a 24 year old female with hx of cold symptoms that have progressed to maxillary sinus congestion and pressure since August or about 6 weeks.  Was seen in [facility] on 9/4/2024 and given Augmentin 3-day treatment without improvement.  Reports no unusual fatigue and malaise. No known fever but feels warm that time.  Reports thick yellow to white nasal discharge.  Pt has been taking Flonase for symptom relief which helps a little. Patient has had symptoms for 6-week.      Current Outpatient Medications   Medication Sig Dispense Refill    fluticasone propionate 50 MCG/ACT Nasal Suspension 1 spray by Nasal route daily.      buPROPion  MG Oral Tablet 24 Hr Take 1 tablet (150 mg total) by mouth daily.      sertraline 50 MG Oral Tab Take 1 tablet (50 mg total) by mouth daily. 30 tablet 0    Norethin Ace-Eth Estrad-FE (BLISOVI FE 1/20) 1-20 MG-MCG Oral Tab Take 1 tablet by mouth daily. SKIP PLACEBOS 84 tablet 2    EPINEPHrine (EPIPEN 2-LAURA) 0.3 MG/0.3ML Injection Solution Auto-injector Inject 0.3 mL (1 each total) as directed as needed.      albuterol 108 (90 Base) MCG/ACT Inhalation Aero Soln Inhale 2 puffs into the lungs every 6 (six) hours as needed for Wheezing. 18 g 3    montelukast 10 MG Oral Tab Take 1 tablet (10 mg total) by mouth nightly. 90 tablet 3    Fluocinonide 0.05 % External Solution APPLY EXTERNALLY TO THE AFFECTED AREA AS NEEDED FOR ITCHING ON SCALP      ustekinumab 90 MG/ML Subcutaneous Solution Prefilled Syringe injection Inject 1 mL (90 mg total) into the skin every 8 weeks. First injection 8 weeks after the initial intravenous dose, then continue every 8 weeks thereafter 1 mL 11    topiramate 25 MG Oral Tab Take 1 tablet (25 mg total) by mouth 2 (two) times daily. (Patient not taking: Reported on 9/4/2024) 60 tablet 3       Past Medical History:    ADHD    Asthma (HCC)    Generalized anxiety disorder    Internal hemorrhoids    Right knee meniscal tear    Tubular adenoma of colon    repeat CLN in 2024    Ulcerative colitis (HCC)    on c-scope, minimal L sided disease      Past Surgical History:   Procedure Laterality Date    Arthroscopy of joint unlisted      Colonoscopy N/A 08/19/2020    Procedure: COLONOSCOPY;  Surgeon: JESUS Correa MD;  Location: [facility] ENDOSCOPY    Colonoscopy      Colonoscopy N/A 06/08/2022    Procedure: COLONOSCOPY;  Surgeon: JESUS Correa MD;  Location: [facility] ENDOSCOPY    Foot surgery Bilateral      x4 2018    Knee surgery Right     2015, 2017, 2018, 2019    Knee surgery Left 03/2023    [place] teeth removed        Family History   Problem Relation Age of Onset    Anxiety Mother     Arthritis Mother     Other (Kidney stone) Mother     Heart Attack Father     Thyroid Cancer Sister 21    Other (Esophagitis) Brother         EOE    Fibromyalgia Maternal Grandmother     Other (SLE) Maternal Grandmother     Heart Attack Maternal Grandmother     Heart Attack Maternal Grandfather     No Known Problems Paternal Grandmother     Cancer Paternal Grandfather         Lung    Ovarian Cancer Neg     Colon Cancer Neg     Breast Cancer Neg       Social History     Socioeconomic History    Marital status: Single   Occupational History    Occupation: Nurse   Tobacco Use    Smoking status: Never    Smokeless tobacco: Never   Vaping Use    Vaping status: Former    Substances: Nicotine   Substance and Sexual Activity    Alcohol use: Yes     Comment: twice a month - socially    Drug use: Never    Sexual activity: Yes     Partners: Male     Birth control/protection: Pill   Other Topics Concern    Pt has a pacemaker No    Pt has a defibrillator No    Reaction to local anesthetic No    Blood Transfusions No   Social History Narrative    Relationships: Mother - Yeimi.* Alexandre Baum - Getting  in February 2025    Children: None     Pets: Dog    School: N/A    Work: Nurse - [employer]    Origin: G[place] in [place].     Interests: Enjoys arts - [employer], doing nails    Spiritual: Not Roman Catholic, not spiritual      Social Determinants of Health      [facility], [facility]    Social Connections         REVIEW OF SYSTEMS:   GENERAL:  See HPI  SKIN: no rashes or abnormal skin lesions  HEENT: See HPI positive postnasal drainage  LUNGS: denies shortness of breath or wheezing, See HPI  CARDIOVASCULAR: denies chest pain or palpitations   GI: denies N/V/C or abdominal pain  NEURO: Denies headaches except sinus facial pressure    EXAM:   /74   Pulse 84   Temp 98.4 °F (36.9 °C)   Ht 5' 9\" (1.753 m)   Wt 253 lb 9.6 oz (115 kg)   SpO2 98%   BMI 37.45 kg/m²   GENERAL: well developed, well nourished.  Pt is in no acute distress  SKIN: no rashes,no suspicious lesions  HEAD: atraumatic, normocephalic.   EYES: conjunctiva clear, EOM intact  EARS: TM's are dull, non-bulging and non-injected, bilaterally serous, right greater than left  NOSE: No exudates, nasal mucosa is erythematous and swollen.  Maxillary sinus pain  THROAT: Oral mucosa pink, moist. Posterior pharynx is mildly erythematous. No exudates. Tonsils not enlarged  NECK: Supple, non-tender  LUNGS: clear to auscultation bilaterally, no wheezes or rhonchi. Breathing is non labored.    CARDIO: RRR without murmur  LYMPH: No cervical lymphadenopathy.        ASSESSMENT AND PLAN:   Nikky Mckeon is a 24 year old female who presents with   Chief Complaint   Patient presents with    Sinus Problem     Sinus infection since August 2024, worsening.   Seen [facility] 09/04/2024          ASSESSMENT  Encounter Diagnoses   Name Primary?    Acute recurrent maxillary sinusitis Yes    Seasonal allergies         PLAN:  Requested Prescriptions     Signed Prescriptions Disp Refills    amoxicillin clavulanate 875-125 MG Oral Tab 20 tablet 0     Sig:  Take 1 tablet by mouth 2 (two) times daily for 10 days. Use alternative birth control while on antibiotics until next period       Patient Instructions   Sinus issues are mostly allergy and inflammation related, and this may last until the first hard freeze.  Can use additional Augmentin as your prior treatment was 3 days if symptoms worsen.  A prescription is available at [facility].  Take until symptoms improve, hopefully will not need the full 10 days but do take at least 7 days  Use once a day allergy medicine such as Zyrtec or Allegra, generic is fine.  Use Flonase regularly not on a as needed basis for chronic nasal congestion.    Afrin/oxymetazoline can help short-term but should not be used more than 3 day increments.  Use Flonase nasal spray daily/regularly as discussed or as needed for nasal congestion.  Consider Oxymetazoline nasal decongestant spray, but MUST limit to 3 days use- as explained on product label.    The patient indicates understanding of these issues and agrees to the plan.

## 2024-10-08 NOTE — PATIENT INSTRUCTIONS
Sinus issues are mostly allergy and inflammation related, and this may last until the first hard freeze.  Can use additional Augmentin as your prior treatment was 3 days if symptoms worsen.  A prescription is available at [facility].  Take until symptoms improve, hopefully will not need the full 10 days but do take at least 7 days  Use once a day allergy medicine such as Zyrtec or Allegra, generic is fine.  Use Flonase regularly not on a as needed basis for chronic nasal congestion.    Afrin/oxymetazoline can help short-term but should not be used more than 3 day increments.

## 2024-10-14 ENCOUNTER — OFFICE VISIT (OUTPATIENT)
Dept: SURGERY | Facility: CLINIC | Age: 24
End: 2024-10-14
Payer: COMMERCIAL

## 2024-10-14 VITALS
DIASTOLIC BLOOD PRESSURE: 80 MMHG | OXYGEN SATURATION: 97 % | HEIGHT: 69.1 IN | HEART RATE: 71 BPM | SYSTOLIC BLOOD PRESSURE: 128 MMHG | BODY MASS INDEX: 37.49 KG/M2 | WEIGHT: 256 LBS

## 2024-10-14 DIAGNOSIS — E78.1 HYPERTRIGLYCERIDEMIA: ICD-10-CM

## 2024-10-14 DIAGNOSIS — E66.9 OBESITY (BMI 30-39.9): ICD-10-CM

## 2024-10-14 DIAGNOSIS — R63.2 BINGE EATING: ICD-10-CM

## 2024-10-14 DIAGNOSIS — Z51.81 ENCOUNTER FOR THERAPEUTIC DRUG MONITORING: Primary | ICD-10-CM

## 2024-10-14 DIAGNOSIS — F41.9 ANXIETY AND DEPRESSION: ICD-10-CM

## 2024-10-14 DIAGNOSIS — F32.A ANXIETY AND DEPRESSION: ICD-10-CM

## 2024-10-14 PROCEDURE — 99213 OFFICE O/P EST LOW 20 MIN: CPT | Performed by: NURSE PRACTITIONER

## 2024-10-14 PROCEDURE — 3008F BODY MASS INDEX DOCD: CPT | Performed by: NURSE PRACTITIONER

## 2024-10-14 PROCEDURE — 3079F DIAST BP 80-89 MM HG: CPT | Performed by: NURSE PRACTITIONER

## 2024-10-14 PROCEDURE — 3074F SYST BP LT 130 MM HG: CPT | Performed by: NURSE PRACTITIONER

## 2024-10-14 RX ORDER — PHENTERMINE HYDROCHLORIDE 37.5 MG/1
37.5 TABLET ORAL
Qty: 30 TABLET | Refills: 3 | Status: SHIPPED | OUTPATIENT
Start: 2024-10-14

## 2024-10-14 RX ORDER — ONDANSETRON 4 MG/1
4 TABLET, ORALLY DISINTEGRATING ORAL EVERY 8 HOURS PRN
Qty: 30 TABLET | Refills: 0 | Status: SHIPPED | OUTPATIENT
Start: 2024-10-14

## 2024-10-14 NOTE — PROGRESS NOTES
[facility] [place]  [address] [facility] [address] [place] [id number]  Dept: [phone]       Patient:  Nikky Mckeon  :      2000  MRN:      [id number]    Chief Complaint:    Chief Complaint   Patient presents with    Follow - Up    Obesity    Weight Management       SUBJECTIVE     History of Present Illness:  Nikky is being seen today for a follow-up for weight management.    Initial HPI:  23 yo female.   Presents to clinic for assistance with weight loss/maintenance.   Hx Ulcerative colitis Dx . Tx has led to weight gain along with pandemic.  Reports lexapro weight gain. She is tapering lexapro, switching to zoloft currently.     Patient is considering medications for weight loss.    Patient has history of eating disorder(s)- anorexia, but was taking Concerta and lacked appetite.     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  Patient lives with [facility].    Patient's goal weight: Healthy    Previous use of medical weight loss medications:    Physical activity: None    Sleep: interrupted hours/night    Sleep screening: night shift     Past Medical History:   Past Medical History:    ADHD    Asthma (HCC)    Generalized anxiety disorder    Internal hemorrhoids    Right knee meniscal tear    Tubular adenoma of colon    repeat CLN in     Ulcerative colitis (HCC)    on c-scope, minimal L sided disease        Comorbidities:      OBJECTIVE     Vitals: /80   Pulse 71   Ht 5' 9.1\" (1.755 m)   Wt 256 lb (116.1 kg)   SpO2 97%   BMI 37.70 kg/m²     Initial weight loss: -02   Total weight loss:  -02   Start weight: 258    Wt Readings from Last 6 Encounters:   10/14/24 256 lb (116.1 kg)   10/07/24 253 lb 9.6 oz (115 kg)   24 255 lb (115.7 kg)   24 255 lb (115.7 kg)   24 258 lb (117 kg)   07/10/24 258 lb (117 kg)       Patient Medications:    Current Outpatient Medications   Medication Sig Dispense Refill    Phentermine HCl 37.5  MG Oral Tab Take 1 tablet (37.5 mg total) by mouth every morning before breakfast. Start with 1/2 tablet daily, increase to full tablet daily as tolerated 30 tablet 3    ondansetron 4 MG Oral Tablet Dispersible Take 1 tablet (4 mg total) by mouth every 8 (eight) hours as needed. 30 tablet 0    SERTRALINE 50 MG Oral Tab TAKE 1 TABLET(50 MG) BY MOUTH DAILY 30 tablet 0    fluticasone propionate 50 MCG/ACT Nasal Suspension 1 spray by Nasal route daily.      amoxicillin clavulanate 875-125 MG Oral Tab Take 1 tablet by mouth 2 (two) times daily for 10 days. Use alternative birth control while on antibiotics until next period 20 tablet 0    topiramate 25 MG Oral Tab Take 1 tablet (25 mg total) by mouth 2 (two) times daily. (Patient not taking: Reported on 9/4/2024) 60 tablet 3    buPROPion  MG Oral Tablet 24 Hr Take 1 tablet (150 mg total) by mouth daily.      Norethin Ace-Eth Estrad-FE (BLISOVI FE 1/20) 1-20 MG-MCG Oral Tab Take 1 tablet by mouth daily. SKIP PLACEBOS 84 tablet 2    EPINEPHrine (EPIPEN 2-LAURA) 0.3 MG/0.3ML Injection Solution Auto-injector Inject 0.3 mL (1 each total) as directed as needed.      albuterol 108 (90 Base) MCG/ACT Inhalation Aero Soln Inhale 2 puffs into the lungs every 6 (six) hours as needed for Wheezing. 18 g 3    montelukast 10 MG Oral Tab Take 1 tablet (10 mg total) by mouth nightly. 90 tablet 3    Fluocinonide 0.05 % External Solution APPLY EXTERNALLY TO THE AFFECTED AREA AS NEEDED FOR ITCHING ON SCALP      ustekinumab 90 MG/ML Subcutaneous Solution Prefilled Syringe injection Inject 1 mL (90 mg total) into the skin every 8 weeks. First injection 8 weeks after the initial intravenous dose, then continue every 8 weeks thereafter 1 mL 11     Allergies:  Chloraprep one step, Chlorhexidine, Methylprednisolone, Bananas, Ibuprofen, and Aluminum     Social History:  Reviewed     Surgical History:    Past Surgical History:   Procedure Laterality Date    Arthroscopy of joint unlisted       Colonoscopy N/A 08/19/2020    Procedure: COLONOSCOPY;  Surgeon: JESUS Correa MD;  Location: [facility] ENDOSCOPY    Colonoscopy      Colonoscopy N/A 06/08/2022    Procedure: COLONOSCOPY;  Surgeon: JESUS Correa MD;  Location: [facility] ENDOSCOPY    Foot surgery Bilateral      x4 2018    Knee surgery Right     2015, 2017, 2018, 2019    Knee surgery Left 03/2023    [place] teeth removed       Family History:    Family History   Problem Relation Age of Onset    Anxiety Mother     Arthritis Mother     Other (Kidney stone) Mother     Heart Attack Father     Thyroid Cancer Sister 21    Other (Esophagitis) Brother         EOE    Fibromyalgia Maternal Grandmother     Other (SLE) Maternal Grandmother     Heart Attack Maternal Grandmother     Heart Attack Maternal Grandfather     No Known Problems Paternal Grandmother     Cancer Paternal Grandfather         Lung    Ovarian Cancer Neg     Colon Cancer Neg     Breast Cancer Neg      Initial Intake:  After dinner behavior:   Night eating: night shift- 2-3 AM- left overs   Portion sizes: +  Binge: +  Emotional: +  Depression: +  Grazing: +  Sweet tooth:   Crunchy/salty: +  Etoh: social   Needs to improve water intake- drinks vitamin water  Soda Drinker: Yes                 If yes, how much?:  0-1/day  Sports Drinks:  Yes                 Juice:  No                     Number of restaurant or fast food meals/week:  1 meals/week    Food Journal  Reviewed and Discussed:       Patient has a Food Journal?: no   Patient is reading nutrition labels?  yes  Average Caloric Intake:     Average CHO Intake:   Is patient exercising? yes  Type of exercise?     Eating Habits  Patient states the following:  Eats 3 meal(s) per day  Length of time it takes to consume a meal:    # of snacks per day:  Type of snacks:    Amount of soda consumption per day:    Amount of water (in ounces) per day:    Toughest challenge:      Nutritional Goals  Eat 3-4 cups of fresh fruits or vegetables daily    Behavior  Modifications Reviewed and Discussed  Eat breakfast, Eat 3 meals per day, Plan meals in advance, Read nutrition labels, Drink 64 oz of water per day, Maintain a daily food journal, Utlize portion control strategies to reduce calorie intake, Identify triggers for eating and manage cues, and Eat slowly and take 20 to 30 minutes to complete each meal    Exercise Goals Reviewed and Discussed    Aim for 150 minutes moderate level exercise weekly with 2-3 days strength training as tolerated     ROS:    Constitutional: negative  Respiratory: negative  Cardiovascular: negative  Gastrointestinal: negative  Integument/breast: negative  Hematologic/lymphatic: negative  Musculoskeletal:negative  Neurological: negative  Behavioral/Psych: negative  Endocrine: negative  All other systems were reviewed and are negative    Physical Exam:   General appearance: alert, appears stated age, cooperative and obese  Head: Normocephalic, without obvious abnormality, atraumatic  Neck: no adenopathy, no carotid bruit, no JVD, supple, symmetrical, trachea midline and thyroid not enlarged, symmetric, no tenderness/mass/nodules  Lungs: clear to auscultation bilaterally  Heart: S1, S2 normal, no murmur, click, rub or gallop, regular rate and rhythm  Abdomen: soft, non-tender; bowel sounds normal; no masses,  no organomegaly and abdomen obese   Extremities: intact, no edema   Pulses: 2+ and symmetric  Skin: intact   Neurologic: Grossly normal      ASSESSMENT       Encounter Diagnosis(ses):   Encounter Diagnoses   Name Primary?    Encounter for therapeutic drug monitoring Yes    Obesity (BMI 30-39.9)     Binge eating     Hypertriglyceridemia     Anxiety and depression        PLAN         Diagnoses and all orders for this visit:    Encounter for therapeutic drug monitoring    Obesity (BMI 30-39.9)  -     Phentermine HCl 37.5 MG Oral Tab; Take 1 tablet (37.5 mg total) by mouth every morning before breakfast. Start with 1/2 tablet daily, increase to  full tablet daily as tolerated    Binge eating    Hypertriglyceridemia    Anxiety and depression    Other orders  -     ondansetron 4 MG Oral Tablet Dispersible; Take 1 tablet (4 mg total) by mouth every 8 (eight) hours as needed.        Hypertriglyceridemia: Stable on the above prescribed meal plan and medication. Liver function stable.     Lab Results   Component Value Date/Time    CHOLEST 186 05/16/2024 11:23 AM    LDL 92 05/16/2024 11:23 AM    HDL 66 (H) 05/16/2024 11:23 AM    TRIG 162 (H) 05/16/2024 11:23 AM    VLDL 26 05/16/2024 11:23 AM     OBESITY/WEIGHT GAIN:     Recommended patient continue intensive lifestyle and behavioral modifications at this time for weight loss.     Reviewed lifestyle modifications: Whole Food/Plant Strong/Low Glycemic Index diet, moderate alcohol consumption, reduced sodium intake to no more than 2,400 mg/day, and at least 150 minutes of moderate physical activity per week.   Avoid processed, poor quality carbohydrates, refined grains, flour, sugar.     Goals for next month:  1. Keep a food log.  2. Drink 64 ounces of non-caloric beverages per day. No fruit juices or regular soda.  3. Aim for 150 minutes moderate exercise per week.    4. Increase fruit and vegetable servings to 5-6 per day.    5. Improve sleep and stress.      Reviewed other labs:  5/16/24- Vitamin d mildly low- supplement OTC daily.  TSH, a1c, CMP, CBC in range.      Discussed medication options for weight loss in detail with patient.      Sister hx papillary thyroid CA diagnosed age 21.     Continue wellbutrin 150 mg daily.  Tapered lexapro.   Switched to zoloft- reduced dose to 50 mg in the AM since last visit.      Denies hx cardiac disease or substance abuse.     Trial phentermine 15 mg by mouth in the AM.  Monitor closely possible serotonin syndrome in the past.      May try to conceive May 2025 after wedding/honeymoon. Monitor.      Discussed risks, benefits, rationale, and side effects of medication(s)  including hypertension, palpitations, tachycardia, dizziness, constipation, dry mouth, and anxiety among others. Must avoid pregnancy during use, counseled on adequate contraception during use. Patient states understanding of all information and instructions.      Met with RD.  [facility] [employer].  Meet with clinical psychologist.   Continue follow up with psychiatrist as recommended.       RTC 4 months.      SHANNON Lizarraga

## 2024-10-21 ENCOUNTER — OFFICE VISIT (OUTPATIENT)
Dept: OTOLARYNGOLOGY | Facility: CLINIC | Age: 24
End: 2024-10-21

## 2024-10-21 DIAGNOSIS — J01.90 ACUTE BACTERIAL SINUSITIS: Primary | ICD-10-CM

## 2024-10-21 DIAGNOSIS — B96.89 ACUTE BACTERIAL SINUSITIS: Primary | ICD-10-CM

## 2024-10-21 PROCEDURE — 99213 OFFICE O/P EST LOW 20 MIN: CPT | Performed by: SPECIALIST

## 2024-10-21 RX ORDER — LEVOFLOXACIN 500 MG/1
500 TABLET, FILM COATED ORAL EVERY 24 HOURS
Qty: 10 TABLET | Refills: 0 | Status: SHIPPED | OUTPATIENT
Start: 2024-10-21

## 2024-10-22 NOTE — PATIENT INSTRUCTIONS
The Augmentin was not working, I placed you on a trial of Levaquin.  Follow-up in 2 to 3 weeks time, sooner if problems.  If symptoms do not resolve a fiberoptic scope will be recommended.

## 2024-10-22 NOTE — PROGRESS NOTES
Nikky Mckeon is a 24 year old female.   Chief Complaint   Patient presents with    Sinus Problem     Sinus infection for multiple months  Pt reports taking Augmentin but continued symptoms     HPI:   Patient here has persistent sinusitis symptoms despite Augmentin.    Current Outpatient Medications   Medication Sig Dispense Refill    levoFLOXacin 500 MG Oral Tab Take 1 tablet (500 mg total) by mouth daily. 10 tablet 0    ondansetron 4 MG Oral Tablet Dispersible Take 1 tablet (4 mg total) by mouth every 8 (eight) hours as needed. 30 tablet 0    topiramate 25 MG Oral Tab Take 1 tablet (25 mg total) by mouth 2 (two) times daily. 60 tablet 3    Phentermine HCl 37.5 MG Oral Tab Take 1 tablet (37.5 mg total) by mouth every morning before breakfast. Start with 1/2 tablet daily, increase to full tablet daily as tolerated 30 tablet 3    SERTRALINE 50 MG Oral Tab TAKE 1 TABLET(50 MG) BY MOUTH DAILY 30 tablet 0    fluticasone propionate 50 MCG/ACT Nasal Suspension 1 spray by Nasal route daily. (Patient not taking: Reported on 10/21/2024)      buPROPion  MG Oral Tablet 24 Hr Take 1 tablet (150 mg total) by mouth daily.      Norethin Ace-Eth Estrad-FE (BLISOVI FE 1/20) 1-20 MG-MCG Oral Tab Take 1 tablet by mouth daily. SKIP PLACEBOS 84 tablet 2    EPINEPHrine (EPIPEN 2-LAURA) 0.3 MG/0.3ML Injection Solution Auto-injector Inject 0.3 mL (1 each total) as directed as needed.      albuterol 108 (90 Base) MCG/ACT Inhalation Aero Soln Inhale 2 puffs into the lungs every 6 (six) hours as needed for Wheezing. 18 g 3    montelukast 10 MG Oral Tab Take 1 tablet (10 mg total) by mouth nightly. 90 tablet 3    Fluocinonide 0.05 % External Solution APPLY EXTERNALLY TO THE AFFECTED AREA AS NEEDED FOR ITCHING ON SCALP      ustekinumab 90 MG/ML Subcutaneous Solution Prefilled Syringe injection Inject 1 mL (90 mg total) into the skin every 8 weeks. First injection 8 weeks after the initial intravenous dose, then continue every 8  weeks thereafter 1 mL 11      Past Medical History:    ADHD    Asthma (HCC)    Generalized anxiety disorder    Internal hemorrhoids    Right knee meniscal tear    Tubular adenoma of colon    repeat CLN in 2024    Ulcerative colitis (HCC)    on c-scope, minimal L sided disease      Social History:  Social History     Socioeconomic History    Marital status: Single   Occupational History    Occupation: Nurse   Tobacco Use    Smoking status: Never    Smokeless tobacco: Never   Vaping Use    Vaping status: Former    Substances: Nicotine   Substance and Sexual Activity    Alcohol use: Yes     Comment: twice a month - socially    Drug use: Never    Sexual activity: Yes     Partners: Male     Birth control/protection: Pill   Other Topics Concern    Pt has a pacemaker No    Pt has a defibrillator No    Reaction to local anesthetic No    Blood Transfusions No   Social History Narrative    Relationships: Mother - Yeimi.* Justo- Bautista - Getting  in February 2025    Children: None    Pets: Dog    School: N/A    Work: Nurse - [employer]    Origin: Grew up in [place].     Interests: Enjoys arts - [employer], doing nails    Spiritual: Not Quaker, not spiritual      Social Drivers of Health      Received [facility]    Social Connections        REVIEW OF SYSTEMS:   GENERAL HEALTH: feels well otherwise  GENERAL : denies fever, chills, sweats, weight loss, weight gain  SKIN: denies any unusual skin lesions or rashes  RESPIRATORY: denies shortness of breath with exertion  NEURO: denies headaches    EXAM:   There were no vitals taken for this visit.  System Details   Skin Inspection - Normal.   Constitutional Overall appearance - Normal.   Head/Face Facial features - Normal. Eyebrows - Normal. Skull - Normal.   Eyes Conjunctiva - Right: Normal, Left: Normal. Pupil - Right: Normal, Left: Normal.    Ears Inspection - Right: Normal, Left: Normal.   Canal - Right:  Normal, Left: Normal.   TM - Right: Normal, Left: Normal.   Nasal External nose - Normal.   Nasal septum - Normal.  Turbinates -flamed and congested turbinates with purulent postnasal drainage   Oral/Oropharynx Lips - Normal, Tonsils - Normal, Tongue - Normal    Neck Exam Inspection - Normal. Palpation - Normal. Parotid gland - Normal. Thyroid gland - Normal.   Lymph Detail Submental. Submandibular. Anterior cervical. Posterior cervical. Supraclavicular all without enlargement   Psychiatric Orientation - Oriented to time, place, person & situation. Appropriate mood and affect.   Neurological Memory - Normal. Cranial nerves - Cranial nerves II through XII grossly intact.     ASSESSMENT AND PLAN:   1. Acute bacterial sinusitis  Changed from Augmentin to Levaquin.  Follow-up in 2 to 3 weeks time, sooner if problems.  A fiberoptic scope will be considered on return if symptoms do not resolve.        The patient indicates understanding of these issues and agrees to the plan.      Sharyn Trejo MD  10/21/2024  8:44 PM

## 2024-10-24 ENCOUNTER — LAB ENCOUNTER (OUTPATIENT)
Dept: LAB | Facility: HOSPITAL | Age: 24
End: 2024-10-24
Attending: INTERNAL MEDICINE
Payer: COMMERCIAL

## 2024-10-24 DIAGNOSIS — K51.319 CHRONIC ULCERATIVE RECTOSIGMOIDITIS WITH COMPLICATION (HCC): Primary | ICD-10-CM

## 2024-10-24 LAB
ALBUMIN SERPL-MCNC: 5.1 G/DL (ref 3.2–4.8)
ALBUMIN/GLOB SERPL: 1.8 {RATIO} (ref 1–2)
ALP LIVER SERPL-CCNC: 54 U/L
ALT SERPL-CCNC: 18 U/L
ANION GAP SERPL CALC-SCNC: 9 MMOL/L (ref 0–18)
AST SERPL-CCNC: 21 U/L (ref ?–34)
BASOPHILS # BLD AUTO: 0.03 X10(3) UL (ref 0–0.2)
BASOPHILS NFR BLD AUTO: 0.3 %
BILIRUB SERPL-MCNC: 0.3 MG/DL (ref 0.3–1.2)
BUN BLD-MCNC: 10 MG/DL (ref 9–23)
BUN/CREAT SERPL: 13 (ref 10–20)
CALCIUM BLD-MCNC: 9.7 MG/DL (ref 8.7–10.4)
CHLORIDE SERPL-SCNC: 108 MMOL/L (ref 98–112)
CO2 SERPL-SCNC: 24 MMOL/L (ref 21–32)
CREAT BLD-MCNC: 0.77 MG/DL
CRP SERPL-MCNC: 2.2 MG/DL (ref ?–1)
DEPRECATED RDW RBC AUTO: 41.2 FL (ref 35.1–46.3)
EGFRCR SERPLBLD CKD-EPI 2021: 110 ML/MIN/1.73M2 (ref 60–?)
EOSINOPHIL # BLD AUTO: 0.24 X10(3) UL (ref 0–0.7)
EOSINOPHIL NFR BLD AUTO: 2.6 %
ERYTHROCYTE [DISTWIDTH] IN BLOOD BY AUTOMATED COUNT: 13.1 % (ref 11–15)
ERYTHROCYTE [SEDIMENTATION RATE] IN BLOOD: 48 MM/HR
FASTING STATUS PATIENT QL REPORTED: NO
GLOBULIN PLAS-MCNC: 2.9 G/DL (ref 2–3.5)
GLUCOSE BLD-MCNC: 80 MG/DL (ref 70–99)
HCT VFR BLD AUTO: 40.8 %
HGB BLD-MCNC: 14 G/DL
IMM GRANULOCYTES # BLD AUTO: 0.02 X10(3) UL (ref 0–1)
IMM GRANULOCYTES NFR BLD: 0.2 %
LYMPHOCYTES # BLD AUTO: 3.79 X10(3) UL (ref 1–4)
LYMPHOCYTES NFR BLD AUTO: 40.4 %
MCH RBC QN AUTO: 29.7 PG (ref 26–34)
MCHC RBC AUTO-ENTMCNC: 34.3 G/DL (ref 31–37)
MCV RBC AUTO: 86.4 FL
MONOCYTES # BLD AUTO: 0.56 X10(3) UL (ref 0.1–1)
MONOCYTES NFR BLD AUTO: 6 %
NEUTROPHILS # BLD AUTO: 4.74 X10 (3) UL (ref 1.5–7.7)
NEUTROPHILS # BLD AUTO: 4.74 X10(3) UL (ref 1.5–7.7)
NEUTROPHILS NFR BLD AUTO: 50.5 %
OSMOLALITY SERPL CALC.SUM OF ELEC: 290 MOSM/KG (ref 275–295)
PLATELET # BLD AUTO: 341 10(3)UL (ref 150–450)
POTASSIUM SERPL-SCNC: 3.9 MMOL/L (ref 3.5–5.1)
PROT SERPL-MCNC: 8 G/DL (ref 5.7–8.2)
RBC # BLD AUTO: 4.72 X10(6)UL
SODIUM SERPL-SCNC: 141 MMOL/L (ref 136–145)
VIT B12 SERPL-MCNC: 401 PG/ML (ref 211–911)
VIT D+METAB SERPL-MCNC: 38.2 NG/ML (ref 30–100)
WBC # BLD AUTO: 9.4 X10(3) UL (ref 4–11)

## 2024-10-24 PROCEDURE — 85652 RBC SED RATE AUTOMATED: CPT

## 2024-10-24 PROCEDURE — 82607 VITAMIN B-12: CPT

## 2024-10-24 PROCEDURE — 86140 C-REACTIVE PROTEIN: CPT

## 2024-10-24 PROCEDURE — 36415 COLL VENOUS BLD VENIPUNCTURE: CPT

## 2024-10-24 PROCEDURE — 82306 VITAMIN D 25 HYDROXY: CPT

## 2024-10-24 PROCEDURE — 80053 COMPREHEN METABOLIC PANEL: CPT

## 2024-10-24 PROCEDURE — 85025 COMPLETE CBC W/AUTO DIFF WBC: CPT

## 2024-10-28 ENCOUNTER — LAB ENCOUNTER (OUTPATIENT)
Dept: LAB | Age: 24
End: 2024-10-28
Attending: INTERNAL MEDICINE
Payer: COMMERCIAL

## 2024-10-28 DIAGNOSIS — K51.319 CHRONIC ULCERATIVE RECTOSIGMOIDITIS WITH COMPLICATION (HCC): ICD-10-CM

## 2024-10-28 PROCEDURE — 83993 ASSAY FOR CALPROTECTIN FECAL: CPT

## 2024-10-29 LAB — CALPROTECTIN STL-MCNT: >800 ΜG/G (ref ?–50)

## 2024-11-05 ENCOUNTER — TELEPHONE (OUTPATIENT)
Dept: INTERNAL MEDICINE CLINIC | Facility: CLINIC | Age: 24
End: 2024-11-05

## 2024-11-05 NOTE — TELEPHONE ENCOUNTER
Patient send [employer] message for vaccine report for work.   Placed vaccine report at . Patient will  report this week.   KELLEE CHRISTOPHER

## 2024-11-14 ENCOUNTER — OFFICE VISIT (OUTPATIENT)
Dept: OTOLARYNGOLOGY | Facility: CLINIC | Age: 24
End: 2024-11-14

## 2024-11-14 DIAGNOSIS — B96.89 ACUTE BACTERIAL SINUSITIS: Primary | ICD-10-CM

## 2024-11-14 DIAGNOSIS — J34.3 NASAL TURBINATE HYPERTROPHY: ICD-10-CM

## 2024-11-14 DIAGNOSIS — J01.90 ACUTE BACTERIAL SINUSITIS: Primary | ICD-10-CM

## 2024-11-14 PROCEDURE — 31231 NASAL ENDOSCOPY DX: CPT | Performed by: SPECIALIST

## 2024-11-14 PROCEDURE — 99213 OFFICE O/P EST LOW 20 MIN: CPT | Performed by: SPECIALIST

## 2024-11-14 RX ORDER — ADALIMUMAB 40MG/0.4ML
KIT SUBCUTANEOUS
COMMUNITY

## 2024-11-14 RX ORDER — MESALAMINE 1000 MG/1
1000 SUPPOSITORY RECTAL NIGHTLY
COMMUNITY
Start: 2024-10-08

## 2024-11-14 RX ORDER — FLUTICASONE FUROATE, UMECLIDINIUM BROMIDE AND VILANTEROL TRIFENATATE 200; 62.5; 25 UG/1; UG/1; UG/1
1 POWDER RESPIRATORY (INHALATION) DAILY
COMMUNITY
Start: 2024-10-14

## 2024-11-14 RX ORDER — TRIAMCINOLONE ACETONIDE 1 MG/G
OINTMENT TOPICAL
COMMUNITY
Start: 2024-10-08

## 2024-11-14 NOTE — PATIENT INSTRUCTIONS
No evidence of purulence by fiberoptic examination.  As you have persistent sinus pressure a CT of the sinuses was ordered.  If this is negative, allergy testing will be recommended.

## 2024-11-14 NOTE — PROGRESS NOTES
Nikky Mckeon is a 24 year old female.   Chief Complaint   Patient presents with    Follow - Up     Patient is here due to acute bacterial sinusitis follow up      HPI:   Patient here finished Levaquin for her sinusitis.  He has persistent sinus pressure.  Is on Humira, so may be somewhat suppressed.    Current Outpatient Medications   Medication Sig Dispense Refill    Adalimumab (HUMIRA, 2 PEN,) 40 MG/0.4ML Subcutaneous Auto-injector Kit       TRELEGY ELLIPTA 200-62.5-25 MCG/ACT Inhalation Aerosol Powder, Breath Activated Inhale 1 puff into the lungs daily.      mesalamine 1000 MG Rectal Suppos Place 1 suppository (1,000 mg total) rectally nightly.      triamcinolone 0.1 % External Ointment APPLY TOPICALLY TO THE AFFECTED AREA DAILY FOR 1 WEEK      buPROPion  MG Oral Tablet 24 Hr Take 1 tablet (150 mg total) by mouth daily. 30 tablet 0    sertraline 100 MG Oral Tab Take 1 tablet (100 mg total) by mouth daily. 30 tablet 0    levoFLOXacin 500 MG Oral Tab Take 1 tablet (500 mg total) by mouth daily. 10 tablet 0    Phentermine HCl 37.5 MG Oral Tab Take 1 tablet (37.5 mg total) by mouth every morning before breakfast. Start with 1/2 tablet daily, increase to full tablet daily as tolerated 30 tablet 3    ondansetron 4 MG Oral Tablet Dispersible Take 1 tablet (4 mg total) by mouth every 8 (eight) hours as needed. 30 tablet 0    fluticasone propionate 50 MCG/ACT Nasal Suspension 1 spray by Nasal route daily.      topiramate 25 MG Oral Tab Take 1 tablet (25 mg total) by mouth 2 (two) times daily. 60 tablet 3    Norethin Ace-Eth Estrad-FE (BLISOVI FE 1/20) 1-20 MG-MCG Oral Tab Take 1 tablet by mouth daily. SKIP PLACEBOS 84 tablet 2    EPINEPHrine (EPIPEN 2-LAURA) 0.3 MG/0.3ML Injection Solution Auto-injector Inject 0.3 mL (1 each total) as directed as needed.      albuterol 108 (90 Base) MCG/ACT Inhalation Aero Soln Inhale 2 puffs into the lungs every 6 (six) hours as needed for Wheezing. 18 g 3    montelukast 10  MG Oral Tab Take 1 tablet (10 mg total) by mouth nightly. 90 tablet 3    Fluocinonide 0.05 % External Solution APPLY EXTERNALLY TO THE AFFECTED AREA AS NEEDED FOR ITCHING ON SCALP      ustekinumab 90 MG/ML Subcutaneous Solution Prefilled Syringe injection Inject 1 mL (90 mg total) into the skin every 8 weeks. First injection 8 weeks after the initial intravenous dose, then continue every 8 weeks thereafter 1 mL 11      Past Medical History:    ADHD    Asthma (HCC)    Generalized anxiety disorder    Internal hemorrhoids    Right knee meniscal tear    Tubular adenoma of colon    repeat CLN in 2024    Ulcerative colitis (HCC)    on c-scope, minimal L sided disease      Social History:  Social History     Socioeconomic History    Marital status: Single   Occupational History    Occupation: Nurse   Tobacco Use    Smoking status: Never    Smokeless tobacco: Never   Vaping Use    Vaping status: Former    Substances: Nicotine   Substance and Sexual Activity    Alcohol use: Yes     Comment: twice a month - socially    Drug use: Never    Sexual activity: Yes     Partners: Male     Birth control/protection: Pill   Other Topics Concern    Pt has a pacemaker No    Pt has a defibrillator No    Reaction to local anesthetic No    Blood Transfusions No   Social History Narrative    Relationships: Mother - Yeimi.* Alexandre Baum - Getting  in February 2025    Children: None    Pets: Dog    School: N/A    Work: Nurse - [employer]    Origin: Grew up in [facility].     Interests: Enjoys arts - [employer], doing nails    Spiritual: Not Rastafari, not spiritual      Social Drivers of Health      [facility]    Social Connections        REVIEW OF SYSTEMS:   GENERAL HEALTH: feels well otherwise  GENERAL : denies fever, chills, sweats, weight loss, weight gain  SKIN: denies any unusual skin lesions or rashes  RESPIRATORY: denies shortness of breath with  exertion  NEURO: denies headaches    EXAM:   There were no vitals taken for this visit.  System Details   Skin Inspection - Normal.   Constitutional Overall appearance - Normal.   Head/Face Facial features - Normal. Eyebrows - Normal. Skull - Normal.   Eyes Conjunctiva - Right: Normal, Left: Normal. Pupil - Right: Normal, Left: Normal.    Ears Inspection - Right: Normal, Left: Normal.   Canal - Right: Normal, Left: Normal.    TM - Right: Normal, Left: Normal.   Nasal External nose - Normal.   Consent was obtained.  The nasal cavity was anesthetized with 1% neosynephrine and 4% lidocaine.  The bilateral nares were examined from the nasal vestibule to the nasopharynx.  Areas examined include the nasal floor, sphenoethmoid recess, turbinates, superior, middle, and inferior meatus, the nasal septum, eustation tube and nasopharynx.  All abnormalities are listed in the exam section.  Nasal congestion.  No purulence or polyps noted by fiberoptic exam.   Oral/Oropharynx Lips - Normal, Tonsils - Normal, Tongue - Normal    Neck Exam Inspection - Normal. Palpation - Normal. Parotid gland - Normal. Thyroid gland - Normal.   Lymph Detail Submental. Submandibular. Anterior cervical. Posterior cervical. Supraclavicular.  All without enlargement   Psychiatric Orientation - Oriented to time, place, person & situation. Appropriate mood and affect.   Neurological Memory - Normal. Cranial nerves - Cranial nerves II through XII grossly intact.   Nasopharynx Normal by fiberoptic exam.     ASSESSMENT AND PLAN:   1. Acute bacterial sinusitis  Assistant sinus pressure despite multiple rounds of antibiotic therapy.  If this is the case, a CT scan of the sinuses was recommended.  If this is negative, allergen zone 8 will be sent for allergy testing.  - CT SINUS (CPT=70486); Future    2. Nasal turbinate hypertrophy  Turbinate congestion, no polyps noted by fiberoptic examination.      The patient indicates understanding of these issues and  agrees to the plan.      Sharyn Trejo MD  11/14/2024  3:41 PM

## 2024-11-16 ENCOUNTER — APPOINTMENT (OUTPATIENT)
Dept: LAB | Facility: HOSPITAL | Age: 24
End: 2024-11-16
Attending: INTERNAL MEDICINE
Payer: COMMERCIAL

## 2024-11-16 PROCEDURE — 87015 SPECIMEN INFECT AGNT CONCNTJ: CPT

## 2024-11-16 PROCEDURE — 87493 C DIFF AMPLIFIED PROBE: CPT

## 2024-11-16 PROCEDURE — 87046 STOOL CULTR AEROBIC BACT EA: CPT

## 2024-11-16 PROCEDURE — 83993 ASSAY FOR CALPROTECTIN FECAL: CPT

## 2024-11-16 PROCEDURE — 87427 SHIGA-LIKE TOXIN AG IA: CPT

## 2024-11-16 PROCEDURE — 87045 FECES CULTURE AEROBIC BACT: CPT

## 2024-11-18 ENCOUNTER — LAB ENCOUNTER (OUTPATIENT)
Dept: LAB | Age: 24
End: 2024-11-18
Attending: INTERNAL MEDICINE
Payer: COMMERCIAL

## 2024-11-18 DIAGNOSIS — K51.319 CHRONIC ULCERATIVE RECTOSIGMOIDITIS WITH COMPLICATION (HCC): Primary | ICD-10-CM

## 2024-11-18 PROCEDURE — 87169 MACROSCOPIC EXAM PARASITE: CPT

## 2024-11-19 LAB
C DIFF TOX B STL QL: NEGATIVE
CALPROTECTIN STL-MCNT: 10.8 ΜG/G (ref ?–50)

## 2024-11-22 ENCOUNTER — HOSPITAL ENCOUNTER (OUTPATIENT)
Dept: CT IMAGING | Facility: HOSPITAL | Age: 24
Discharge: HOME | End: 2024-11-22
Attending: SPECIALIST
Payer: COMMERCIAL

## 2024-11-22 DIAGNOSIS — J01.90 ACUTE BACTERIAL SINUSITIS: ICD-10-CM

## 2024-11-22 DIAGNOSIS — B96.89 ACUTE BACTERIAL SINUSITIS: ICD-10-CM

## 2024-11-22 PROCEDURE — 70486 CT MAXILLOFACIAL W/O DYE: CPT | Performed by: SPECIALIST

## 2024-11-22 NOTE — PROGRESS NOTES
Negative CT of the sinuses.  There is a septal deviation to the right.  Are you still having sinus pressure or is it better after the antibiotics?  Thank you.

## 2025-02-11 DIAGNOSIS — Z30.41 ENCOUNTER FOR SURVEILLANCE OF CONTRACEPTIVE PILLS: ICD-10-CM

## 2025-02-11 RX ORDER — NORETHINDRONE ACETATE AND ETHINYL ESTRADIOL 1MG-20(21)
1 KIT ORAL DAILY
Qty: 84 TABLET | Refills: 0 | Status: SHIPPED | OUTPATIENT
Start: 2025-02-11

## 2025-03-24 NOTE — PROGRESS NOTES
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  -slightly better with transition  -but days where she is still sleeping a lot still  -no bleeding  S: (snoring) No  T: (tired/fatigued during daytime) Yes  O: (observed stop breathing/gasp @ night) No  P: (pressure=HTN) No  B: (BMI>35) Yes  A: (age>50) No  N: (neck size M >17 inch collar or 43cm, F >16 inch or 41cm) No  G: (Male) No  EUGENIO Screenin-2 Low risk of EUGENIO     #Binge eating  #Body dysmorphia  #Obesity  -working on a healthy diet   -was seeing a therapist - desires to see a new one  -weight specialist - Paty ORTEGA  -sister with papillary thryoid cancer  -trial of phentermine of topiramate  -stopped taking meds - desiring to try      #MDD  #TRISTON  #ADHD  -sertraline 100 mg daily   -bupropion 300 mg daily   -lorazepam 0.5 as needed  -reports ADHD evaluated by psychiatry in the past   -not needing adderall at this time, has used in the past and didn't like it  -depression better  -Maribel Morton PA-C  -seeing a therapist   -overall doing much better now     #R 2nd toe fracture  -podiatry Dr Moreau  -wearing a boot    #Asthma  #Seasonal allergies  -cetirizine  -albuterol as needed    ---other     #Sinusitis  -ENT Dr Trejo  -reports recently improved     #Psoriasis  #Hives  -history of scalp psoriasis  -comes and goes   -dermatology - Dr Walls  -epipen     #Heavy periods  -gynecology - Dr Smith   -microgestin fe - skipping placebo     #History of multiple meniscus tears  -history of multiple surgeries  -R knee  -reports getting gel injections   -ortho Dr Garza      #UC  -GI - Dr Seals  -ustekinumab  -reports overall doing well at this time  -plan for colonoscopy in       ROS  GENERAL: No fever/chills, no recent weight loss  HEENT: No visual changes, no changes in hearing, no sore throats  NECK: No  pain, no swelling  RESP: No cough, no SOB  CV: No chest pain, no palpitations  GI: No abd pain, no N/V/D  MSK: No edema  SKIN: No new rashes  NEURO: No numbness, no tingling, no headaches    HEALTH MAINTENANCE  Health Maintenance Topics with due status: Overdue       Topic Date Due    Colorectal Cancer Screening 06/08/2024    COVID-19 Vaccine 09/01/2024     Health Maintenance Topics with due status: Due Soon       Topic Date Due    Annual Physical 05/16/2025       ALLERGIES  Allergies[1]    MEDICATIONS  Current Outpatient Medications   Medication Sig Dispense Refill    buPROPion  MG Oral Tablet 24 Hr Take 1 tablet (300 mg total) by mouth daily. 30 tablet 0    sertraline 100 MG Oral Tab Take 1 tablet (100 mg total) by mouth daily. 30 tablet 0    LORazepam 0.5 MG Oral Tab Take 1-2 tablets (0.5-1 mg total) by mouth daily as needed for Anxiety. 10 tablet 0    Norethin Ace-Eth Estrad-FE (BLISOVI FE 1/20) 1-20 MG-MCG Oral Tab Take 1 tablet by mouth daily. SKIP PLACEBO 84 tablet 0    mesalamine 1000 MG Rectal Suppos Place 1 suppository (1,000 mg total) rectally nightly.      triamcinolone 0.1 % External Ointment APPLY TOPICALLY TO THE AFFECTED AREA DAILY FOR 1 WEEK      EPINEPHrine (EPIPEN 2-LAURA) 0.3 MG/0.3ML Injection Solution Auto-injector Inject 0.3 mL (1 each total) as directed as needed.      albuterol 108 (90 Base) MCG/ACT Inhalation Aero Soln Inhale 2 puffs into the lungs every 6 (six) hours as needed for Wheezing. 18 g 3    Fluocinonide 0.05 % External Solution APPLY EXTERNALLY TO THE AFFECTED AREA AS NEEDED FOR ITCHING ON SCALP      ustekinumab 90 MG/ML Subcutaneous Solution Prefilled Syringe injection Inject 1 mL (90 mg total) into the skin every 8 weeks. First injection 8 weeks after the initial intravenous dose, then continue every 8 weeks thereafter 1 mL 11       ACTIVE PROBLEMS  Patient Active Problem List   Diagnosis    Ulcerative colitis (HCC)    Asthma (HCC)    Current moderate episode of major  depressive disorder without prior episode (HCC)    Psoriasis    Generalized anxiety disorder    ADHD    Right knee meniscal tear    Heavy period    Class 2 severe obesity due to excess calories with serious comorbidity and body mass index (BMI) of 36.0 to 36.9 in adult (HCC)    Binge eating    Body dysmorphic disorder    Health maintenance examination    Seasonal allergies    Dyslipidemia    Hypertriglyceridemia    Toe fracture, right    Other fatigue       PAST MEDICAL HISTORY  Past Medical History:    ADHD    Asthma (HCC)    Generalized anxiety disorder    Internal hemorrhoids    Right knee meniscal tear    Tubular adenoma of colon    repeat CLN in 2024    Ulcerative colitis (HCC)    on c-scope, minimal L sided disease       PAST SOCIAL HISTORY  Social History     Socioeconomic History    Marital status: Single     Spouse name: Not on file    Number of children: Not on file    Years of education: Not on file    Highest education level: Not on file   Occupational History    Occupation: Nurse   Tobacco Use    Smoking status: Never    Smokeless tobacco: Never   Vaping Use    Vaping status: Former    Substances: Nicotine   Substance and Sexual Activity    Alcohol use: Yes     Comment: twice a month - socially    Drug use: Never    Sexual activity: Yes     Partners: Male     Birth control/protection: Pill   Other Topics Concern    Grew up on a farm Not Asked    History of tanning Not Asked    Outdoor occupation Not Asked    Pt has a pacemaker No    Pt has a defibrillator No    Breast feeding Not Asked    Reaction to local anesthetic No    Caffeine Concern Not Asked    Exercise Not Asked    Seat Belt Not Asked    Special Diet Not Asked    Stress Concern Not Asked    Weight Concern Not Asked     Service Not Asked    Blood Transfusions No    Occupational Exposure Not Asked    Hobby Hazards Not Asked    Sleep Concern Not Asked    Back Care Not Asked    Bike Helmet Not Asked    Self-Exams Not Asked   Social History  Narrative    Relationships:  - Bautista. 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     Interests: Enjoys [employer], doing nails    Spiritual: Not Restorationism, not spiritual      Social Drivers of Health     Food Insecurity: Not on file   Transportation Needs: Not on file   Stress: Not on file   Housing Stability: Not on file       PAST SURGICAL HISTORY  Past Surgical History:   Procedure Laterality Date    Arthroscopy of joint unlisted      Colonoscopy N/A 08/19/2020    Procedure: COLONOSCOPY;  Surgeon: JESUS Correa MD;  Location: [facility] ENDOSCOPY    Colonoscopy      Colonoscopy N/A 06/08/2022    Procedure: COLONOSCOPY;  Surgeon: JESUS Correa MD;  Location: [facility] ENDOSCOPY    Foot surgery Bilateral      x4 2018    Knee surgery Right     2015, 2017, 2018, 2019    Knee surgery Left 03/2023    [place] teeth removed         PAST FAMILY HISTORY  Family History   Problem Relation Age of Onset    Anxiety Mother     Arthritis Mother     Other (Kidney stone) Mother     Heart Attack Father     Thyroid Cancer Sister 21    Other (Esophagitis) Brother         EOE    Fibromyalgia Maternal Grandmother     Other (SLE) Maternal Grandmother     Heart Attack Maternal Grandmother     Heart Attack Maternal Grandfather     No Known Problems Paternal Grandmother     Cancer Paternal Grandfather         Lung    Ovarian Cancer Neg     Colon Cancer Neg     Breast Cancer Neg          PHYSICAL EXAM  Vitals:    03/25/25 1434   BP: 128/86   Pulse: 78   Temp: 98.4 °F (36.9 °C)   SpO2: 99%   Weight: 246 lb (111.6 kg)   Height: 5' 9\" (1.753 m)      Body mass index is 36.33 kg/m².    GENERAL: NAD  RESP: Non-labored respirations, CTAB, no wheezing, no rales, no rhonchi  CV: RRR, no murmurs  MSK: Wearing a boot for R foot.  SKIN: Warm and dry, no rashes  NEURO: Answering questions appropriately    LABS  Lab Results   Component Value  Date    WBC 9.4 10/24/2024    HGB 14.0 10/24/2024    HCT 40.8 10/24/2024    .0 10/24/2024    NEPERCENT 50.5 10/24/2024    LYPERCENT 40.4 10/24/2024    MOPERCENT 6.0 10/24/2024    EOPERCENT 2.6 10/24/2024    BAPERCENT 0.3 10/24/2024    NE 4.74 10/24/2024    LYMABS 3.79 10/24/2024    MOABSO 0.56 10/24/2024    EOABSO 0.24 10/24/2024    BAABSO 0.03 10/24/2024       Lab Results   Component Value Date     10/24/2024    K 3.9 10/24/2024     10/24/2024    CO2 24.0 10/24/2024    ANIONGAP 9 10/24/2024    BUN 10 10/24/2024    CREATSERUM 0.77 10/24/2024    BUNCREA 13.0 10/24/2024    GLU 80 10/24/2024    CA 9.7 10/24/2024    OSMOCALC 290 10/24/2024    GFRNAA 124 05/24/2022    GFRAA 143 05/24/2022    ALT 18 10/24/2024    AST 21 10/24/2024    ALKPHO 54 10/24/2024    BILT 0.3 10/24/2024    TP 8.0 10/24/2024    ALB 5.1 (H) 10/24/2024    GLOBULIN 2.9 10/24/2024    ELECTAG 1.8 10/24/2024    FASTING No 10/24/2024         Lab Results   Component Value Date    CHOLEST 186 05/16/2024    TRIG 162 (H) 05/16/2024    HDL 66 (H) 05/16/2024    LDL 92 05/16/2024    VLDL 26 05/16/2024    NONHDLC 120 05/16/2024        DIAGNOSTICS      ASSESSMENT/PLAN  Problem List Items Addressed This Visit          HCC Problems    Class 2 severe obesity due to excess calories with serious comorbidity and body mass index (BMI) of 36.0 to 36.9 in adult (HCC)     Try to focus on a healthy diet and exercise.   Following with weight specialist.  Can monitor labs         Relevant Orders    Comp Metabolic Panel (14)    TSH W Reflex To Free T4       Pulmonary and Pneumonias    Asthma (HCC)     History of asthma and allergies.   Overall well controlled at this time.  Continue cetirizine.   Uses albuterol as needed.  If worsening asthma, can follow up with me as needed.             Mental Health    ADHD     Patient reports history of ADHD.  She does not feel like she needs any sort of medication at this time.         Body dysmorphic disorder     Patient  reports a history of body dysmorphia  On sertraline and bupropion  Sees a therapist         Generalized anxiety disorder     Patient with depression and anxiety.  Depression and anxiety are currently improved.  Following with psychiatry now.   Currently on sertraline 100 mg daily and bupropion  mg daily.  Seeing a therapist.  With improvement in mood hopefully fatigue symptoms will also improve.             Symptoms and Signs    Binge eating     Saw a weight specialist  Lost weight on phentermine and topiramate.  Paused medication as she would eventually like to try and conceive.          Other fatigue - Primary     Patient with fatigue  Seems like her fatigue is somewhat gradually getting better.    Suspect that her fatigue is multifactorial.  She had a very stressful job and has now switched jobs to a better environment.  She is no longer working nights as well  Her mood is also improved.  Does have a history of ulcerative colitis, however she notes no significant bleeding.  Can check labs including CBC, CMP, TSH.  Will refer to pulmonology for sleep apnea evaluation.  Follow-up as needed         Relevant Orders    CBC With Differential With Platelet    Comp Metabolic Panel (14)    Hemoglobin A1C    Lipid Panel    TSH W Reflex To Free T4    PULMONARY - INTERNAL       Return in about 6 months (around 2025) for physical.    This is a Header Test   Topic Date Due    Annual Physical  2025        Charan Montes MD  Family Medicine           Pre-chartin minutes  Reviewing/obtaining: 10 minutes  Medical Exam:1 minutes  Counseling/education: 5 minutes  Notes: 5 minutes  Referring/communicatin minutes  Care coordination: 0 minutes    My total time spent caring for the patient on the day of the encounter: 23 minutes         [1]   Allergies  Allergen Reactions    Chloraprep One Step HIVES    Chlorhexidine HIVES    Methylprednisolone ITCHING, SWELLING and UNKNOWN    Other HIVES     cold    Banana  OTHER (SEE COMMENTS)     Sores in mouth    Bananas OTHER (SEE COMMENTS)     Sores in mouth    Ibuprofen OTHER (SEE COMMENTS)    Aluminum RASH

## 2025-03-25 ENCOUNTER — OFFICE VISIT (OUTPATIENT)
Dept: INTERNAL MEDICINE CLINIC | Facility: CLINIC | Age: 25
End: 2025-03-25
Payer: COMMERCIAL

## 2025-03-25 VITALS
TEMPERATURE: 98 F | WEIGHT: 246 LBS | OXYGEN SATURATION: 99 % | BODY MASS INDEX: 36.43 KG/M2 | HEIGHT: 69 IN | SYSTOLIC BLOOD PRESSURE: 128 MMHG | HEART RATE: 78 BPM | DIASTOLIC BLOOD PRESSURE: 86 MMHG

## 2025-03-25 DIAGNOSIS — E66.812 CLASS 2 SEVERE OBESITY DUE TO EXCESS CALORIES WITH SERIOUS COMORBIDITY AND BODY MASS INDEX (BMI) OF 36.0 TO 36.9 IN ADULT (HCC): ICD-10-CM

## 2025-03-25 DIAGNOSIS — E66.01 CLASS 2 SEVERE OBESITY DUE TO EXCESS CALORIES WITH SERIOUS COMORBIDITY AND BODY MASS INDEX (BMI) OF 36.0 TO 36.9 IN ADULT (HCC): ICD-10-CM

## 2025-03-25 DIAGNOSIS — J45.20 MILD INTERMITTENT ASTHMA WITHOUT COMPLICATION (HCC): ICD-10-CM

## 2025-03-25 DIAGNOSIS — F90.9 ATTENTION DEFICIT HYPERACTIVITY DISORDER (ADHD), UNSPECIFIED ADHD TYPE: ICD-10-CM

## 2025-03-25 DIAGNOSIS — R63.2 BINGE EATING: ICD-10-CM

## 2025-03-25 DIAGNOSIS — F45.22 BODY DYSMORPHIC DISORDER: ICD-10-CM

## 2025-03-25 DIAGNOSIS — F41.1 GENERALIZED ANXIETY DISORDER: ICD-10-CM

## 2025-03-25 DIAGNOSIS — R53.83 OTHER FATIGUE: Primary | ICD-10-CM

## 2025-03-25 PROBLEM — S92.911A TOE FRACTURE, RIGHT: Status: ACTIVE | Noted: 2025-03-25

## 2025-03-25 PROBLEM — E66.9 OBESITY (BMI 30-39.9): Status: RESOLVED | Noted: 2024-07-10 | Resolved: 2025-03-25

## 2025-03-25 PROBLEM — F32.A ANXIETY AND DEPRESSION: Status: RESOLVED | Noted: 2024-07-10 | Resolved: 2025-03-25

## 2025-03-25 PROBLEM — F41.9 ANXIETY AND DEPRESSION: Status: RESOLVED | Noted: 2024-07-10 | Resolved: 2025-03-25

## 2025-03-25 PROCEDURE — 99213 OFFICE O/P EST LOW 20 MIN: CPT | Performed by: FAMILY MEDICINE

## 2025-03-25 PROCEDURE — 3074F SYST BP LT 130 MM HG: CPT | Performed by: FAMILY MEDICINE

## 2025-03-25 PROCEDURE — 3079F DIAST BP 80-89 MM HG: CPT | Performed by: FAMILY MEDICINE

## 2025-03-25 PROCEDURE — 3008F BODY MASS INDEX DOCD: CPT | Performed by: FAMILY MEDICINE

## 2025-03-25 NOTE — ASSESSMENT & PLAN NOTE
Patient with fatigue  Seems like her fatigue is somewhat gradually getting better.    Suspect that her fatigue is multifactorial.  She had a very stressful job and has now switched jobs to a better environment.  She is no longer working nights as well  Her mood is also improved.  Does have a history of ulcerative colitis, however she notes no significant bleeding.  Can check labs including CBC, CMP, TSH.  Will refer to pulmonology for sleep apnea evaluation.  Follow-up as needed

## 2025-03-25 NOTE — ASSESSMENT & PLAN NOTE
Patient with depression and anxiety.  Depression and anxiety are currently improved.  Following with psychiatry now.   Currently on sertraline 100 mg daily and bupropion  mg daily.  Seeing a therapist.  With improvement in mood hopefully fatigue symptoms will also improve.

## 2025-03-25 NOTE — PATIENT INSTRUCTIONS
PATIENT INSTRUCTIONS    Thank you for seeing me today, it was a pleasure taking care of you.  Please check out at the  and schedule a follow up appointment.  Return in about 6 months (around 9/25/2025) for physical.  Please remember that the preferred nasrin period for appointments is 5 minutes. This is to help maximize the amount of time that we can spend together at our visits.    Blood work  See pulmonology - sleep apnea evaluation  Continue to see podiatry  Continue to see psychiatry   Monitor fatigue symptoms for now    Best,  Dr. Montes

## 2025-03-25 NOTE — ASSESSMENT & PLAN NOTE
History of asthma and allergies.   Overall well controlled at this time.  Continue cetirizine.   Uses albuterol as needed.  If worsening asthma, can follow up with me as needed.

## 2025-03-25 NOTE — ASSESSMENT & PLAN NOTE
Saw a weight specialist  Lost weight on phentermine and topiramate.  Paused medication as she would eventually like to try and conceive.

## 2025-03-25 NOTE — ASSESSMENT & PLAN NOTE
Try to focus on a healthy diet and exercise.   Following with weight specialist.  Can monitor labs

## 2025-04-10 ENCOUNTER — LAB ENCOUNTER (OUTPATIENT)
Dept: LAB | Age: 25
End: 2025-04-10
Attending: FAMILY MEDICINE
Payer: COMMERCIAL

## 2025-04-10 DIAGNOSIS — R53.83 OTHER FATIGUE: ICD-10-CM

## 2025-04-10 DIAGNOSIS — E66.812 CLASS 2 SEVERE OBESITY DUE TO EXCESS CALORIES WITH SERIOUS COMORBIDITY AND BODY MASS INDEX (BMI) OF 36.0 TO 36.9 IN ADULT (HCC): ICD-10-CM

## 2025-04-10 DIAGNOSIS — E66.01 CLASS 2 SEVERE OBESITY DUE TO EXCESS CALORIES WITH SERIOUS COMORBIDITY AND BODY MASS INDEX (BMI) OF 36.0 TO 36.9 IN ADULT (HCC): ICD-10-CM

## 2025-04-10 LAB
ALBUMIN SERPL-MCNC: 4.8 G/DL (ref 3.2–4.8)
ALBUMIN/GLOB SERPL: 1.7 {RATIO} (ref 1–2)
ALP LIVER SERPL-CCNC: 56 U/L (ref 37–98)
ALT SERPL-CCNC: 30 U/L (ref 10–49)
ANION GAP SERPL CALC-SCNC: 9 MMOL/L (ref 0–18)
AST SERPL-CCNC: 35 U/L (ref ?–34)
BASOPHILS # BLD AUTO: 0.03 X10(3) UL (ref 0–0.2)
BASOPHILS NFR BLD AUTO: 0.5 %
BILIRUB SERPL-MCNC: 0.4 MG/DL (ref 0.3–1.2)
BUN BLD-MCNC: 9 MG/DL (ref 9–23)
BUN/CREAT SERPL: 12.5 (ref 10–20)
CALCIUM BLD-MCNC: 9.6 MG/DL (ref 8.7–10.4)
CHLORIDE SERPL-SCNC: 104 MMOL/L (ref 98–112)
CHOLEST SERPL-MCNC: 221 MG/DL (ref ?–200)
CO2 SERPL-SCNC: 27 MMOL/L (ref 21–32)
CREAT BLD-MCNC: 0.72 MG/DL (ref 0.55–1.02)
DEPRECATED RDW RBC AUTO: 42.1 FL (ref 35.1–46.3)
EGFRCR SERPLBLD CKD-EPI 2021: 118 ML/MIN/1.73M2 (ref 60–?)
EOSINOPHIL # BLD AUTO: 0.11 X10(3) UL (ref 0–0.7)
EOSINOPHIL NFR BLD AUTO: 1.8 %
ERYTHROCYTE [DISTWIDTH] IN BLOOD BY AUTOMATED COUNT: 12.6 % (ref 11–15)
EST. AVERAGE GLUCOSE BLD GHB EST-MCNC: 94 MG/DL (ref 68–126)
FASTING PATIENT LIPID ANSWER: NO
FASTING STATUS PATIENT QL REPORTED: NO
GLOBULIN PLAS-MCNC: 2.8 G/DL (ref 2–3.5)
GLUCOSE BLD-MCNC: 80 MG/DL (ref 70–99)
HBA1C MFR BLD: 4.9 % (ref ?–5.7)
HCT VFR BLD AUTO: 43.7 % (ref 35–48)
HDLC SERPL-MCNC: 81 MG/DL (ref 40–59)
HGB BLD-MCNC: 14.3 G/DL (ref 12–16)
IMM GRANULOCYTES # BLD AUTO: 0.04 X10(3) UL (ref 0–1)
IMM GRANULOCYTES NFR BLD: 0.7 %
LDLC SERPL CALC-MCNC: 113 MG/DL (ref ?–100)
LYMPHOCYTES # BLD AUTO: 2.17 X10(3) UL (ref 1–4)
LYMPHOCYTES NFR BLD AUTO: 35.9 %
MCH RBC QN AUTO: 29.7 PG (ref 26–34)
MCHC RBC AUTO-ENTMCNC: 32.7 G/DL (ref 31–37)
MCV RBC AUTO: 90.9 FL (ref 80–100)
MONOCYTES # BLD AUTO: 0.38 X10(3) UL (ref 0.1–1)
MONOCYTES NFR BLD AUTO: 6.3 %
NEUTROPHILS # BLD AUTO: 3.32 X10 (3) UL (ref 1.5–7.7)
NEUTROPHILS # BLD AUTO: 3.32 X10(3) UL (ref 1.5–7.7)
NEUTROPHILS NFR BLD AUTO: 54.8 %
NONHDLC SERPL-MCNC: 140 MG/DL (ref ?–130)
OSMOLALITY SERPL CALC.SUM OF ELEC: 288 MOSM/KG (ref 275–295)
PLATELET # BLD AUTO: 322 10(3)UL (ref 150–450)
POTASSIUM SERPL-SCNC: 4.4 MMOL/L (ref 3.5–5.1)
PROT SERPL-MCNC: 7.6 G/DL (ref 5.7–8.2)
RBC # BLD AUTO: 4.81 X10(6)UL (ref 3.8–5.3)
SODIUM SERPL-SCNC: 140 MMOL/L (ref 136–145)
TRIGL SERPL-MCNC: 157 MG/DL (ref 30–149)
TSI SER-ACNC: 1.24 UIU/ML (ref 0.55–4.78)
VLDLC SERPL CALC-MCNC: 27 MG/DL (ref 0–30)
WBC # BLD AUTO: 6.1 X10(3) UL (ref 4–11)

## 2025-04-10 PROCEDURE — 80050 GENERAL HEALTH PANEL: CPT | Performed by: FAMILY MEDICINE

## 2025-04-10 PROCEDURE — 83036 HEMOGLOBIN GLYCOSYLATED A1C: CPT | Performed by: FAMILY MEDICINE

## 2025-04-10 PROCEDURE — 80061 LIPID PANEL: CPT | Performed by: FAMILY MEDICINE

## 2025-04-17 ENCOUNTER — HOSPITAL ENCOUNTER (OUTPATIENT)
Dept: GENERAL RADIOLOGY | Facility: HOSPITAL | Age: 25
Discharge: HOME | End: 2025-04-17
Attending: NURSE PRACTITIONER
Payer: COMMERCIAL

## 2025-04-17 ENCOUNTER — OFFICE VISIT (OUTPATIENT)
Dept: FAMILY MEDICINE CLINIC | Facility: CLINIC | Age: 25
End: 2025-04-17
Payer: COMMERCIAL

## 2025-04-17 VITALS
DIASTOLIC BLOOD PRESSURE: 76 MMHG | OXYGEN SATURATION: 97 % | HEART RATE: 91 BPM | WEIGHT: 244 LBS | SYSTOLIC BLOOD PRESSURE: 122 MMHG | HEIGHT: 69 IN | RESPIRATION RATE: 16 BRPM | TEMPERATURE: 98 F | BODY MASS INDEX: 36.14 KG/M2

## 2025-04-17 DIAGNOSIS — R09.89 SCATTERED RHONCHI OF LEFT LUNG: ICD-10-CM

## 2025-04-17 DIAGNOSIS — R05.1 ACUTE COUGH: ICD-10-CM

## 2025-04-17 DIAGNOSIS — Z01.89 PATIENT REQUEST FOR DIAGNOSTIC TESTING: ICD-10-CM

## 2025-04-17 DIAGNOSIS — J06.9 URI, ACUTE: Primary | ICD-10-CM

## 2025-04-17 PROBLEM — E78.1 HYPERTRIGLYCERIDEMIA: Status: RESOLVED | Noted: 2024-07-10 | Resolved: 2025-04-17

## 2025-04-17 PROBLEM — E78.2 MIXED HYPERLIPIDEMIA: Status: ACTIVE | Noted: 2024-07-10

## 2025-04-17 PROCEDURE — 71046 X-RAY EXAM CHEST 2 VIEWS: CPT | Performed by: NURSE PRACTITIONER

## 2025-04-17 PROCEDURE — 87637 SARSCOV2&INF A&B&RSV AMP PRB: CPT | Performed by: NURSE PRACTITIONER

## 2025-04-17 PROCEDURE — 99213 OFFICE O/P EST LOW 20 MIN: CPT | Performed by: NURSE PRACTITIONER

## 2025-04-17 PROCEDURE — 87880 STREP A ASSAY W/OPTIC: CPT | Performed by: NURSE PRACTITIONER

## 2025-04-17 PROCEDURE — 3008F BODY MASS INDEX DOCD: CPT | Performed by: NURSE PRACTITIONER

## 2025-04-17 PROCEDURE — 3074F SYST BP LT 130 MM HG: CPT | Performed by: NURSE PRACTITIONER

## 2025-04-17 PROCEDURE — 3078F DIAST BP <80 MM HG: CPT | Performed by: NURSE PRACTITIONER

## 2025-04-17 RX ORDER — FLUTICASONE PROPIONATE 50 MCG
2 SPRAY, SUSPENSION (ML) NASAL DAILY
Qty: 1 EACH | Refills: 0 | Status: SHIPPED | OUTPATIENT
Start: 2025-04-17

## 2025-04-17 RX ORDER — PREDNISONE 20 MG/1
40 TABLET ORAL DAILY
Qty: 10 TABLET | Refills: 0 | Status: SHIPPED | OUTPATIENT
Start: 2025-04-17 | End: 2025-04-22

## 2025-04-17 NOTE — PROGRESS NOTES
CHIEF COMPLAINT:     Chief Complaint   Patient presents with    Cough     Sx Monday - General headache  Sx Tuesday - Productive cough, chest congestion, wheezing, ST from coughing, slight sinus pressure, slight bilat ear pressure, PND, loss of appetite  Sx today - Nasal congestion, runny nose  Denies ear pain, fever, chills, body aches, n/v/d, rash  Negative Covid test at home last night   OTC Tylenol, decongestant, and inhaler  Needs work note       HPI:   Nikky Mckeon is a 25 year old female who presents for upper respiratory symptoms for  3 days. Patient reports sore throat, congestion, cough that is slightly productive, chest discomfort from coughing, ear pain, wheezing, OTC cold meds have not been helping, denies fever. Symptoms have been worsening since onset.  Treating symptoms with Tylenol, decongestant, albuterol inhaler.  PT is immunocompromised. Exposure to sick contacts at work. No viral testing completed at home. PT received flu vaccines this season,.     Current Medications[1]   Past Medical History[2]   Past Surgical History[3]      Short Social Hx on File[4]      REVIEW OF SYSTEMS:   Review of Systems   Constitutional:  Positive for appetite change. Negative for chills and fever.   HENT:  Positive for congestion, ear pain, postnasal drip and sinus pressure.    Respiratory:  Positive for cough and wheezing. Negative for chest tightness.    Neurological:  Positive for headaches.          EXAM:   /76   Pulse 91   Temp 98.2 °F (36.8 °C) (Tympanic)   Resp 16   Ht 5' 9\" (1.753 m)   Wt 244 lb (110.7 kg)   SpO2 97%   BMI 36.03 kg/m²   Physical Exam  Vitals and nursing note reviewed.   Constitutional:       Appearance: Normal appearance. She is not ill-appearing.   HENT:      Head: Normocephalic and atraumatic.      Right Ear: Hearing, tympanic membrane, ear canal and external ear normal. No drainage. There is no impacted cerumen. Tympanic membrane is not injected, perforated,  erythematous or bulging.      Left Ear: Hearing, tympanic membrane, ear canal and external ear normal. No drainage. There is no impacted cerumen. Tympanic membrane is not injected, perforated, erythematous or bulging.      Nose: Mucosal edema, congestion and rhinorrhea present.      Right Sinus: No maxillary sinus tenderness or frontal sinus tenderness.      Left Sinus: No maxillary sinus tenderness or frontal sinus tenderness.      Mouth/Throat:      Lips: No lesions.      Mouth: Mucous membranes are moist. No oral lesions.      Palate: No lesions.      Pharynx: Oropharynx is clear. Uvula midline. No oropharyngeal exudate or posterior oropharyngeal erythema.      Tonsils: No tonsillar exudate or tonsillar abscesses.   Eyes:      General: No scleral icterus.        Right eye: No discharge.         Left eye: No discharge.      Conjunctiva/sclera: Conjunctivae normal.   Cardiovascular:      Rate and Rhythm: Normal rate and regular rhythm.      Heart sounds: Normal heart sounds. No murmur heard.  Pulmonary:      Effort: Pulmonary effort is normal. No tachypnea, accessory muscle usage or respiratory distress.      Breath sounds: Examination of the right-middle field reveals wheezing. Examination of the left-middle field reveals wheezing and rhonchi. Wheezing and rhonchi present.      Comments: Wheezing improves with cough  Lymphadenopathy:      Cervical: No cervical adenopathy.   Skin:     General: Skin is warm and dry.   Neurological:      Mental Status: She is alert and oriented to person, place, and time.   Psychiatric:         Mood and Affect: Mood normal.         Behavior: Behavior normal.           ASSESSMENT AND PLAN:   Nikky Mckeon is a 25 year old female who presents with upper respiratory symptoms that are consistent with    ASSESSMENT:   Encounter Diagnoses   Name Primary?    URI, acute Yes    Acute cough     Scattered rhonchi of left lung     Patient request for diagnostic testing        PLAN:  Negative rapid strep in clinic today, exam not consistent with strep throat.  Patient requesting viral testing, quad viral panel collected for COVID, flu A, flu B, and RSV.  Due to scattered rhonchi auscultated over the left lung in immunocompromised state, patient sent for outpatient chest x-ray.  Chest x-ray is negative for pneumonia.  Reviewed over-the-counter remedies to help symptoms including daily Flonase and Zyrtec along with Mucinex or Delsym for cough.  Patient can continue albuterol inhaler as needed for cough/wheezing.  Offered short course of oral steroid for cough and wheezing.  Meds as below.  Comfort care as described in Patient Instructions.  If patient experiences shortness of breath, difficulty breathing, and/or chest pain, report to nearest emergency room for prompt reevaluation and treatment.    Meds & Refills for this Visit:  Requested Prescriptions     Signed Prescriptions Disp Refills    fluticasone propionate 50 MCG/ACT Nasal Suspension 1 each 0     Si sprays by Each Nare route daily.    predniSONE 20 MG Oral Tab 10 tablet 0     Sig: Take 2 tablets (40 mg total) by mouth daily for 5 days.     Risks, benefits, and side effects of medication explained and discussed.    The patient indicates understanding of these issues and agrees to the plan.  The patient is asked to f/u with PCP if sx's persist.             [1]   Current Outpatient Medications   Medication Sig Dispense Refill    fluticasone propionate 50 MCG/ACT Nasal Suspension 2 sprays by Each Nare route daily. 1 each 0    predniSONE 20 MG Oral Tab Take 2 tablets (40 mg total) by mouth daily for 5 days. 10 tablet 0    buPROPion  MG Oral Tablet 24 Hr Take 1 tablet (300 mg total) by mouth daily. 30 tablet 0    sertraline 100 MG Oral Tab Take 1 tablet (100 mg total) by mouth daily. 30 tablet 0    LORazepam 0.5 MG Oral Tab Take 1-2 tablets (0.5-1 mg total) by mouth daily as needed for Anxiety. 10 tablet 0    Norethin Ace-Eth  Estrad-FE (BLISOVI FE 1/20) 1-20 MG-MCG Oral Tab Take 1 tablet by mouth daily. SKIP PLACEBO 84 tablet 0    mesalamine 1000 MG Rectal Suppos Place 1 suppository (1,000 mg total) rectally nightly.      triamcinolone 0.1 % External Ointment APPLY TOPICALLY TO THE AFFECTED AREA DAILY FOR 1 WEEK      albuterol 108 (90 Base) MCG/ACT Inhalation Aero Soln Inhale 2 puffs into the lungs every 6 (six) hours as needed for Wheezing. 18 g 3    Fluocinonide 0.05 % External Solution APPLY EXTERNALLY TO THE AFFECTED AREA AS NEEDED FOR ITCHING ON SCALP      ustekinumab 90 MG/ML Subcutaneous Solution Prefilled Syringe injection Inject 1 mL (90 mg total) into the skin every 8 weeks. First injection 8 weeks after the initial intravenous dose, then continue every 8 weeks thereafter 1 mL 11    EPINEPHrine (EPIPEN 2-LAURA) 0.3 MG/0.3ML Injection Solution Auto-injector Inject 0.3 mL (1 each total) as directed as needed.     [2]   Past Medical History:   ADHD    Asthma (HCC)    Generalized anxiety disorder    Internal hemorrhoids    Right knee meniscal tear    Tubular adenoma of colon    repeat CLN in 2024    Ulcerative colitis (HCC)    on c-scope, minimal L sided disease   [3]   Past Surgical History:  Procedure Laterality Date    Arthroscopy of joint unlisted      Colonoscopy N/A 08/19/2020    Procedure: COLONOSCOPY;  Surgeon: JESUS Correa MD;  Location: [facility] ENDOSCOPY    Colonoscopy      Colonoscopy N/A 06/08/2022    Procedure: COLONOSCOPY;  Surgeon: JESUS Correa MD;  Location: [facility] ENDOSCOPY    Foot surgery Bilateral      x4 2018    Knee surgery Right     2015, 2017, 2018, 2019    Knee surgery Left 03/2023    [place] teeth removed     [4]   Social History  Socioeconomic History    Marital status: Single   Occupational History    Occupation: Nurse   Tobacco Use    Smoking status: Never    Smokeless tobacco: Never   Vaping Use    Vaping status: Former    Substances: Nicotine   Substance and Sexual Activity    Alcohol use: Yes      Comment: twice a month - socially    Drug use: Never    Sexual activity: Yes     Partners: Male     Birth control/protection: Pill   Other Topics Concern    Pt has a pacemaker No    Pt has a defibrillator No    Reaction to local anesthetic No    Blood Transfusions No   Social History Narrative    Relationships:  - Bautista. 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     Interests: Enjoys arts - croquet, doing nails    Spiritual: Not Spiritism, not spiritual

## 2025-04-18 LAB
FLUAV + FLUBV RNA SPEC NAA+PROBE: NOT DETECTED
FLUAV + FLUBV RNA SPEC NAA+PROBE: NOT DETECTED
RSV RNA SPEC NAA+PROBE: NOT DETECTED
SARS-COV-2 RNA RESP QL NAA+PROBE: NOT DETECTED

## 2025-05-04 ENCOUNTER — HOSPITAL ENCOUNTER (OUTPATIENT)
Age: 25
Discharge: HOME | End: 2025-05-04
Payer: COMMERCIAL

## 2025-05-04 VITALS
DIASTOLIC BLOOD PRESSURE: 82 MMHG | OXYGEN SATURATION: 99 % | SYSTOLIC BLOOD PRESSURE: 146 MMHG | HEART RATE: 88 BPM | TEMPERATURE: 98 F | RESPIRATION RATE: 19 BRPM

## 2025-05-04 DIAGNOSIS — J01.00 ACUTE MAXILLARY SINUSITIS, RECURRENCE NOT SPECIFIED: Primary | ICD-10-CM

## 2025-05-04 DIAGNOSIS — H92.02 LEFT EAR PAIN: ICD-10-CM

## 2025-05-04 NOTE — ED PROVIDER NOTES
Chief Complaint   Patient presents with    Ear Pain       HPI:     Nikky Mckeon is a 25 year old female who presents for evaluation and management of a chief complaint of cough, nasal congestion, sinus pressure, ongoing for 3 weeks.  Was seen in another urgent care, diagnosed with left otitis media, prescribed amoxicillin 1000 mg 3 times a day for 7 days.  Has been taking this for about 5 days, with no improvement in ear pain or sinus pressure.  She has had no chest pain or shortness of breath.  No nausea, vomiting, diarrhea, or abdominal pain.    [facility]  PFS asessment screens reviewed and agree.  Nursing note reviewed and I agree with documentation.    Family History[1]  Family history reviewed with patient/caregiver and is not pertinent to presenting problem.  Social History     Socioeconomic History    Marital status: Single     Spouse name: Not on file    Number of children: Not on file    Years of education: Not on file    Highest education level: Not on file   Occupational History    Occupation: Nurse   Tobacco Use    Smoking status: Never    Smokeless tobacco: Never   Vaping Use    Vaping status: Former    Substances: Nicotine   Substance and Sexual Activity    Alcohol use: Yes     Comment: twice a month - socially    Drug use: Never    Sexual activity: Yes     Partners: Male     Birth control/protection: Pill   Other Topics Concern    Grew up on a farm Not Asked    History of tanning Not Asked    Outdoor occupation Not Asked    Pt has a pacemaker No    Pt has a defibrillator No    Breast feeding Not Asked    Reaction to local anesthetic No    Caffeine Concern Not Asked    Exercise Not Asked    Seat Belt Not Asked    Special Diet Not Asked    Stress Concern Not Asked    Weight Concern Not Asked     Service Not Asked    Blood Transfusions No    Occupational Exposure Not Asked    Hobby Hazards Not Asked    Sleep Concern Not Asked    Back Care Not Asked    Bike Helmet Not Asked    Self-Exams Not  Asked   Social History Narrative    Relationships:  - Bautista. 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     Interests: Enjoys [employer], doing nails    Spiritual: Not Zoroastrian, not spiritual      Social Drivers of Health     Food Insecurity: Not on file   Transportation Needs: Not on file   Housing Stability: Not on file        Findings:    /82   Pulse 88   Temp 98.2 °F (36.8 °C) (Oral)   Resp 19   SpO2 99%   GENERAL: well developed, well nourished, well hydrated, no distress  HEAD: normocephalic  NECK: supple, no adenopathy  EYES: sclera non icteric bilateral, conjunctiva clear  EARS: TM  bilateral: normal and external auditory canals clear  NOSE: nasal turbinates: swollen, red, and clear drainage. + bilateral maxillary sinus tenderness   THROAT: clear, without exudates  CARDIO: RRR without murmur  LUNGS: clear to auscultation bilaterally; no rales, rhonchi, or wheezes  GI: soft, non-tender, normal bowel sounds  SKIN: good skin turgor, no obvious rashes    MDM/Assessment/Plan:   Orders for this encounter:  Orders Placed This Encounter    amoxicillin clavulanate 875-125 MG Oral Tab     Sig: Take 1 tablet by mouth 2 (two) times daily for 7 days.     Dispense:  14 tablet     Refill:  0       Labs performed this visit:  No results found for this or any previous visit (from the past 10 hours).    MDM:   Medical Decision Making  Differentials include: acute maxillary sinusitis vs viral URI vs pneumonia vs otitis media vs other     HPI and exam consistent with acute maxillary sinusitis.  Bilateral ear exam is normal.  Lungs are clear today, low suspicion for pneumonia. Will stop the amoxicillin, and start Augmentin 1 tablet twice a day for 7 days.  Supportive care discussed including Vargas sinus rinse, Flonase, fluids, steam showers. Return precautions discussed. Advised follow up with  primary care provider in 1 week if no improvement. Patient verbalized understanding and agreeable to plan of care.       Risk  OTC drugs.  Prescription drug management.          Diagnosis:    ICD-10-CM    1. Acute maxillary sinusitis, recurrence not specified  J01.00       2. Left ear pain  H92.02           All results reviewed and discussed with patient.  See AVS for detailed discharge instructions for your condition today.    Follow Up with:  No follow-up provider specified.         [1]   Family History  Problem Relation Age of Onset    Anxiety Mother     Arthritis Mother     Other (Kidney stone) Mother     Heart Attack Father     Thyroid Cancer Sister 21    Other (Esophagitis) Brother         EOE    Fibromyalgia Maternal Grandmother     Other (SLE) Maternal Grandmother     Heart Attack Maternal Grandmother     Heart Attack Maternal Grandfather     No Known Problems Paternal Grandmother     Cancer Paternal Grandfather         Lung    Ovarian Cancer Neg     Colon Cancer Neg     Breast Cancer Neg

## 2025-05-04 NOTE — ED INITIAL ASSESSMENT (HPI)
Pt c/o ear pain x 1 week, pt has been taking amoxicillin x 5 days sts that her sx has no relief, denies fever

## 2025-05-04 NOTE — DISCHARGE INSTRUCTIONS
Augmentin 1 tablet twice a day for 7 days  Flonase nasal spray, 2 sprays in each nostril daily for 2 weeks  Vargas Sinus rinse, available over the counter, do this 2-3 times per day  Push fluids  Steam showers  If you develop facial swelling, chest pain, shortness of breath or any new/worsening symptoms, return or go to the emergency room  If no improvement in 1 week, please see your primary care provider

## 2025-05-07 ENCOUNTER — APPOINTMENT (OUTPATIENT)
Dept: URBAN - METROPOLITAN AREA CLINIC 244 | Age: 25
Setting detail: DERMATOLOGY
End: 2025-05-07

## 2025-05-07 DIAGNOSIS — L73.9 FOLLICULAR DISORDER, UNSPECIFIED: ICD-10-CM

## 2025-05-07 PROCEDURE — 99213 OFFICE O/P EST LOW 20 MIN: CPT

## 2025-05-07 PROCEDURE — OTHER COUNSELING: OTHER

## 2025-05-07 PROCEDURE — OTHER PRESCRIPTION: OTHER

## 2025-05-07 PROCEDURE — OTHER ADDITIONAL NOTES: OTHER

## 2025-05-07 RX ORDER — CLINDAMYCIN PHOSPHATE 10 MG/ML
LOTION TOPICAL
Qty: 60 | Refills: 6 | Status: ERX | COMMUNITY
Start: 2025-05-07

## 2025-05-07 ASSESSMENT — LOCATION SIMPLE DESCRIPTION DERM
LOCATION SIMPLE: CHEST
LOCATION SIMPLE: RIGHT UPPER BACK

## 2025-05-07 ASSESSMENT — LOCATION ZONE DERM: LOCATION ZONE: TRUNK

## 2025-05-07 ASSESSMENT — LOCATION DETAILED DESCRIPTION DERM
LOCATION DETAILED: UPPER STERNUM
LOCATION DETAILED: RIGHT SUPERIOR MEDIAL UPPER BACK

## 2025-06-10 ENCOUNTER — HOSPITAL ENCOUNTER (OUTPATIENT)
Age: 25
Discharge: HOME | End: 2025-06-10
Payer: COMMERCIAL

## 2025-06-10 VITALS
DIASTOLIC BLOOD PRESSURE: 86 MMHG | OXYGEN SATURATION: 96 % | SYSTOLIC BLOOD PRESSURE: 119 MMHG | HEART RATE: 88 BPM | TEMPERATURE: 98 F | RESPIRATION RATE: 18 BRPM

## 2025-06-10 DIAGNOSIS — H66.90 ACUTE OTITIS MEDIA, UNSPECIFIED OTITIS MEDIA TYPE: Primary | ICD-10-CM

## 2025-06-10 PROCEDURE — 99213 OFFICE O/P EST LOW 20 MIN: CPT | Performed by: PHYSICIAN ASSISTANT

## 2025-06-10 NOTE — ED PROVIDER NOTES
Patient Seen in: [facility]        History  Chief Complaint   Patient presents with    Ear Pain     Stated Complaint: ear infection    Subjective:   HPI            Patient is a 25-year-old female who presents to immediate care due to left ear pain x 2 days.  Patient notes gradual onset of left ear pain.  Denies recent URI symptoms including sinus congestion and cough.  Patient had otitis media infection roughly over 1 month ago seen at this immediate care treated with Augmentin.  Patient states that symptoms completely resolved at that time.  States she has a history of chronic sinusitis followed by ENT, Dr. Trejo.  Denies history of recurrent otitis media infections.  Has been taking Tylenol with moderate relief.      Objective:     No pertinent past medical history.            No pertinent past surgical history.              No pertinent social history.            Review of Systems    Positive for stated complaint: ear infection  Other systems are as noted in HPI.  Constitutional and vital signs reviewed.      All other systems reviewed and negative except as noted above.                  Physical Exam    ED Triage Vitals [06/10/25 1653]   /86   Pulse 88   Resp 18   Temp 98.2 °F (36.8 °C)   Temp src Oral   SpO2 96 %   O2 Device None (Room air)       Current Vitals:   Vital Signs  BP: 119/86  Pulse: 88  Resp: 18  Temp: 98.2 °F (36.8 °C)  Temp src: Oral    Oxygen Therapy  SpO2: 96 %  O2 Device: None (Room air)            Physical Exam  Vital signs reviewed. Nursing note reviewed.  Constitutional: Well-developed. Well-nourished. In no acute distress  HENT: Mucous membranes moist.  Bulging erythematous left TM.  Right TM intact.  No trismus.   EYES: No scleral icterus or conjunctival injection.  NECK: Full ROM. Supple.   CARDIAC: Normal rate.   PULM/CHEST: . No wheezes  Extremities: Full ROM  NEURO: Awake, alert, following commands, moving extremities, answering questions.   SKIN: Warm and dry. No  rash or lesions.  PSYCH: Normal judgment. Normal affect.                        MDM     Patient is a healthy vaccinated 25-year-old female that presents to immediate care due to left ear pain x 2 days.  Patient arrives afebrile, nontoxic sitting comfortably in no acute distress.  Physical exam showing bulging erythematous left TM.  Most likely otitis media.  Less likely otitis externa, mastoiditis.  Will treat with Augmentin for 10 days.  Encourage use of antihistamines including Claritin or Zyrtec along with Benadryl at nighttime for added antihistamine relief.  Continue Tylenol as needed for pain.  History given by patient.  Patient agreeable to plan all questions answered.  Discussed with patient if symptoms recur or persist to follow-up with ENT.          Medical Decision Making      Disposition and Plan     Clinical Impression:  1. Acute otitis media, unspecified otitis media type         Disposition:  Discharge  6/10/2025  5:12 pm    Follow-up:  Sharyn Trejo MD  [address] [place] [address]  [phone]    Schedule an appointment as soon as possible for a visit             Medications Prescribed:  Current Discharge Medication List                Supplementary Documentation:

## 2025-06-30 ENCOUNTER — OFFICE VISIT (OUTPATIENT)
Dept: OTOLARYNGOLOGY | Facility: CLINIC | Age: 25
End: 2025-06-30

## 2025-06-30 DIAGNOSIS — H60.312 ACUTE DIFFUSE OTITIS EXTERNA OF LEFT EAR: Primary | ICD-10-CM

## 2025-06-30 DIAGNOSIS — J34.2 NASAL SEPTAL DEVIATION: ICD-10-CM

## 2025-06-30 PROCEDURE — 99213 OFFICE O/P EST LOW 20 MIN: CPT | Performed by: SPECIALIST

## 2025-06-30 RX ORDER — OFLOXACIN 3 MG/ML
5 SOLUTION AURICULAR (OTIC) 2 TIMES DAILY
Qty: 5 ML | Refills: 0 | Status: SHIPPED | OUTPATIENT
Start: 2025-06-30 | End: 2025-07-10

## 2025-06-30 NOTE — PROGRESS NOTES
Nikky Mckeon is a 25 year old female.   Chief Complaint   Patient presents with    Ear Problem     Reports chronic Left ear infections, has been on multiple rounds of Abx.        HPI:   Patient here with pain in her left ear.  She had been placed on Augmentin which caused some difficulty with her ulcerative colitis.    Current Medications[1]   Past Medical History[2]   Social History:  Short Social Hx on File[3]     REVIEW OF SYSTEMS:   GENERAL HEALTH: feels well otherwise  GENERAL : denies fever, chills, sweats, weight loss, weight gain  SKIN: denies any unusual skin lesions or rashes  RESPIRATORY: denies shortness of breath with exertion  NEURO: denies headaches    EXAM:   There were no vitals taken for this visit.  System Details   Skin Inspection - Normal.   Constitutional Overall appearance - Normal.   Head/Face Facial features - Normal. Eyebrows - Normal. Skull - Normal.   Eyes Conjunctiva - Right: Normal, Left: Normal. Pupil - Right: Normal, Left: Normal.    Ears Inspection - Right: Normal, Left: Normal.   Canal - Right: Normal, Left: Debris in the external auditory canal including on the tympanic membrane fully suctioned and Floxin drops placed  TM - Right: Normal, Left: Great normal.  No evidence of middle ear fluid either ear   Nasal External nose - Normal.   Nasal septum -right septal deviation  Turbinates - Normal.   Oral/Oropharynx Lips - Normal, Tonsils - Normal, Tongue - Normal    Neck Exam Inspection - Normal. Palpation - Normal. Parotid gland - Normal. Thyroid gland - Normal.   Lymph Detail Submental. Submandibular. Anterior cervical. Posterior cervical. Supraclavicular all without enlargement   Psychiatric Orientation - Oriented to time, place, person & situation. Appropriate mood and affect.   Neurological Memory - Normal. Cranial nerves - Cranial nerves II through XII grossly intact.     ASSESSMENT AND PLAN:   1. Acute diffuse otitis externa of left ear  Cleaned and patient placed on  Floxin drops.  Call or follow-up if symptoms do not resolve or return.    2. Nasal septal deviation  To the right.      The patient indicates understanding of these issues and agrees to the plan.      Sharyn Trejo MD  6/30/2025  4:23 PM       [1]   Current Outpatient Medications   Medication Sig Dispense Refill    ofloxacin 0.3 % Otic Solution Place 5 drops into the left ear 2 (two) times daily for 10 days. 5 mL 0    SERTRALINE 100 MG Oral Tab TAKE 1 TABLET BY MOUTH EVERY DAY 30 tablet 0    buPROPion  MG Oral Tablet 24 Hr Take 1 tablet (300 mg total) by mouth daily. 90 tablet 0    fluticasone propionate 50 MCG/ACT Nasal Suspension 2 sprays by Each Nare route daily. 1 each 0    LORazepam 0.5 MG Oral Tab Take 1-2 tablets (0.5-1 mg total) by mouth daily as needed for Anxiety. 10 tablet 0    mesalamine 1000 MG Rectal Suppos Place 1 suppository (1,000 mg total) rectally nightly.      triamcinolone 0.1 % External Ointment APPLY TOPICALLY TO THE AFFECTED AREA DAILY FOR 1 WEEK      EPINEPHrine (EPIPEN 2-LAURA) 0.3 MG/0.3ML Injection Solution Auto-injector Inject 0.3 mL (1 each total) as directed as needed.      albuterol 108 (90 Base) MCG/ACT Inhalation Aero Soln Inhale 2 puffs into the lungs every 6 (six) hours as needed for Wheezing. 18 g 3    Fluocinonide 0.05 % External Solution APPLY EXTERNALLY TO THE AFFECTED AREA AS NEEDED FOR ITCHING ON SCALP      ustekinumab 90 MG/ML Subcutaneous Solution Prefilled Syringe injection Inject 1 mL (90 mg total) into the skin every 8 weeks. First injection 8 weeks after the initial intravenous dose, then continue every 8 weeks thereafter 1 mL 11    Norethin Ace-Eth Estrad-FE (BLISOVI FE 1/20) 1-20 MG-MCG Oral Tab Take 1 tablet by mouth daily. SKIP PLACEBO (Patient not taking: Reported on 6/30/2025) 84 tablet 0   [2]   Past Medical History:   ADHD    Asthma (HCC)    Generalized anxiety disorder    Internal hemorrhoids    Right knee meniscal tear    Tubular adenoma of colon     repeat CLN in 2024    Ulcerative colitis (HCC)    on c-scope, minimal L sided disease   [3]   Social History  Socioeconomic History    Marital status: Single   Occupational History    Occupation: Nurse   Tobacco Use    Smoking status: Never    Smokeless tobacco: Never   Vaping Use    Vaping status: Former    Substances: Nicotine   Substance and Sexual Activity    Alcohol use: Yes     Comment: twice a month - socially    Drug use: Never    Sexual activity: Yes     Partners: Male     Birth control/protection: Pill   Other Topics Concern    Pt has a pacemaker No    Pt has a defibrillator No    Reaction to local anesthetic No    Blood Transfusions No   Social History Narrative    Relationships:  - Bautista. 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     Interests: Enjoys arts - croquet, doing nails    Spiritual: Not Jain, not spiritual

## 2025-06-30 NOTE — PATIENT INSTRUCTIONS
You were placed on Floxin drops for your left otitis externa.  You have an incidental right septal deviation.  Call or follow-up with any additional questions or problems.

## 2025-08-04 ENCOUNTER — OFFICE VISIT (OUTPATIENT)
Dept: FAMILY MEDICINE CLINIC | Facility: CLINIC | Age: 25
End: 2025-08-04

## 2025-08-04 VITALS
HEIGHT: 69 IN | RESPIRATION RATE: 18 BRPM | BODY MASS INDEX: 39.81 KG/M2 | OXYGEN SATURATION: 98 % | HEART RATE: 81 BPM | SYSTOLIC BLOOD PRESSURE: 118 MMHG | DIASTOLIC BLOOD PRESSURE: 74 MMHG | TEMPERATURE: 98 F | WEIGHT: 268.81 LBS

## 2025-08-04 DIAGNOSIS — H69.92 EUSTACHIAN TUBE DISORDER, LEFT: Primary | ICD-10-CM

## 2025-08-04 DIAGNOSIS — R35.0 FREQUENCY OF URINATION: ICD-10-CM

## 2025-08-04 LAB
APPEARANCE: CLEAR
BILIRUBIN: NEGATIVE
GLUCOSE (URINE DIPSTICK): NEGATIVE MG/DL
KETONES (URINE DIPSTICK): NEGATIVE MG/DL
LEUKOCYTES: NEGATIVE
MULTISTIX LOT#: ABNORMAL NUMERIC
NITRITE, URINE: NEGATIVE
PH, URINE: 5.5 (ref 4.5–8)
PROTEIN (URINE DIPSTICK): NEGATIVE MG/DL
SPECIFIC GRAVITY: >=1.03 (ref 1–1.03)
URINE-COLOR: YELLOW
UROBILINOGEN,SEMI-QN: 0.2 MG/DL (ref 0–1.9)

## 2025-08-04 PROCEDURE — 87086 URINE CULTURE/COLONY COUNT: CPT | Performed by: NURSE PRACTITIONER

## 2025-08-04 RX ORDER — FLUTICASONE PROPIONATE 50 MCG
2 SPRAY, SUSPENSION (ML) NASAL DAILY
Qty: 16 G | Refills: 0 | Status: SHIPPED | OUTPATIENT
Start: 2025-08-04 | End: 2025-09-03

## (undated) DEVICE — FORCEP RADIAL JAW 4

## (undated) DEVICE — Device: Brand: DEFENDO AIR/WATER/SUCTION AND BIOPSY VALVE

## (undated) DEVICE — MEDI-VAC NON-CONDUCTIVE SUCTION TUBING 6MM X 1.8M (6FT.) L: Brand: CARDINAL HEALTH

## (undated) DEVICE — 35 ML SYRINGE REGULAR TIP: Brand: MONOJECT

## (undated) DEVICE — LINE MNTR ADLT SET O2 INTMD

## (undated) DEVICE — Device: Brand: DUAL NARE NASAL CANNULAE FEMALE LUER CON 7FT O2 TUBE

## (undated) DEVICE — 60 ML SYRINGE REGULAR TIP: Brand: MONOJECT

## (undated) DEVICE — SNARE OPTMZ PLPCTM TRP

## (undated) DEVICE — Device: Brand: CUSTOM PROCEDURE KIT

## (undated) DEVICE — MASK PROC W/VISOR ANTIGLARE

## (undated) DEVICE — KIT CLEAN ENDOKIT 1.1OZ GOWNX2

## (undated) DEVICE — TRAP MCS 40ML 5IN PLS SCR CAP

## (undated) DEVICE — SNARE ENDOSCOPIC 10MM ROUND

## (undated) DEVICE — TRAP 4 CPTR CHMBR N EZ INLN

## (undated) DEVICE — KIT ENDO ORCAPOD 160/180/190

## (undated) DEVICE — 3 ML SYRINGE LUER-LOCK TIP: Brand: MONOJECT

## (undated) DEVICE — 6 ML SYRINGE LUER-LOCK TIP: Brand: MONOJECT

## (undated) DEVICE — CLIP LGT 11MM OPEN 2.8MM 235CM

## (undated) NOTE — LETTER
[address], [place]  Authorization for Invasive Procedures  1. I hereby authorize Dr. Carmine Lebron , my physician and whomever may be designated as the doctor's assistant, to perform the following operation and/or procedure:  Colonoscopy on Tiffany Francis at [facility].    2. My physician has explained to me the nature and purpose of the operation or other procedure, possible alternative methods of treatment, the risks involved and the possibility of complications to me. I understand the probable consequences of declining the recommended procedure and the alternative methods of treatment. I acknowledge that no guarantee has been made as to the result that may be obtained. 3. I recognize that during the course of this operation or other procedure, unforeseen conditions may necessitate additional or different procedures than those listed above. I, therefore, further authorize and request that the above-named physician, his/her physician assistants, or designees perform such procedures as are, in his/her professional opinion, necessary and desirable. If I have a Do Not Attempt Resuscitation (DNAR) order in place, that status will be suspended while in the operating room, procedural suite, and during the recovery period unless otherwise explicitly stated by me (or a person authorized to consent on my behalf). The surgeon or my attending physician will determine when the applicable recovery period ends for purposes of reinstating the DNAR order. 4. Should the need arise during my operation or immediate post-operative period; I also consent to the administration of blood and/or blood products.  Further, I understand that despite careful testing and screening of blood and blood products, I may still be subject to ill effects as a result of recieving a blood transfusion an/or blood producst. The following are some, but not all, of the potential risks that can occur: fever and allergic reactions, hemolytic reactions, transmission of disease such as hepatitis, AIDS, cytomegalovirus (CMV), and flluid overload. In the event that I wish to have autologous transfusions of my own blood, or a directed donor transfusion, I will discuss this with my physician. 5. I consent to the photographing of the operations or procedures to be performed for the purposes of advancing medicine, science, and/or education, provided my identity is not revealed. If the procedure has been videotaped, the physician/surgeon will obtain the original videotape. The hospital will not be responsible for storage or maintenance of this tape. 6. I consent to the presence of a  or observer as deemed necessary by my physician or his designee. 7. Any tissues or organs removed in the operation or other procedure may be disposed of by and at the discretion of [facility].    8. I understand that the physician and his/her physician assistants may not be employees or agents of [facility], [facility], [facility], but are independent medical practitioners who have been permitted to use its facilities for the care and treatment of their patients. 9. Patients having a sterilization procedure: I understand that if the procedure is successful the results will be permanent and it will therefore be impossible for me to inseminate, conceive or bear children. I also understand that the procedure is intended to result in sterility, although the result has not been guaranteed. 10. I CERTIFY THAT I HAVE READ AND FULLY UNDERSTAND THE ABOVE CONSENT TO OPERATION and/or OTHER PROCEDURE. 11. I acknowledge that my physician has explained sedation/analgesia administration to me including the risks and benefits. I consent to the administration of sedation/analgesia as may be necessary or desirable in the judgment of my physician.      Signature of Patient:  ________________________________________________ Date: _________Time: _________    Responsible person in case of minor or unconscious: _____________________________Relationship: ____________     Witness Signature: ____________________________________________ Date: __________ Time: ___________    Statement of Physician  My signature below affirms that prior to the time of the procedure, I have explained to the patient and/or her legal representative, the risks and benefits involved in the proposed treatment and any reasonable alternative to the proposed treatment. I have also explained the risks and benefits involved in the refusal of the proposed treatment and have answered the patient's questions. If I have a significant financial interest in this procedure/surgery, I have disclosed this and had a discussion with my patient.     Signature of Physician:   ________________________________________Date: _________Time:_______ Patient Name: Elda Benjamin  : 2000   Printed: 2022    Medical Record #: [id number]

## (undated) NOTE — MR AVS SNAPSHOT
[facility] SPECIALTY [facility] [place] [address] [address] [id number] [phone]               Thank you for choosing us for your health care visit with Juli Lainez MD.  We are glad to serve you and happy to provide you with this summary of y medications prescribed for you. Read the directions carefully, and ask your doctor or other care provider to review them with you.          Where to Get Your Medications      These medications were sent to [facility]  [address] [facility], [address]  [address] Visit [facility] online at  [email or web address]

## (undated) NOTE — LETTER
Date: 4/17/2025    Patient Name: Nikky Mckeon          To Whom it may concern:    This letter has been written at the patient's request. The above patient was seen at [facility] for treatment of a medical condition.    This patient should be excused from attending work/school from 4/17/2025 through 4/18/2025.    The patient may return to work/school on 4/19/2025 with the following limitations: PT must be fever free without the use of fever reducing medication and noted symptom improvement.        Sincerely,    SHANNON Dodd

## (undated) NOTE — Clinical Note
Thank you for the referral!!  She has UC and usually takes antibiotics 2-3 x year for UTI or sinus infections. I gave her coupons for VSL#3 and I provided her a sample of Zenpep. If she get relief she will request a script from you.

## (undated) NOTE — Clinical Note
Paxton, I met with Nikky in clinic today for weight loss/management. I have recommended intensive lifestyle/behavioral modifications for weight loss. In addition, I have recommended consideration for a gentle stimulant with careful monitoring to help support her efforts along with visits with our dietician and clinical psychologist. She will follow up routinely for ongoing support.  Please let me know if you have any questions or concerns. Thank you very much for referring your patient to our clinic.   Take good care, SHANNON Lozano

## (undated) NOTE — LETTER
[address]  Authorization for Invasive Procedure                                                                                           1. I hereby authorize Torie Bearden MD, my physician and his/her assistants (if applicable), which may include medical students, residents, and/or fellows, to perform the following surgical operation/ procedure and administer such anesthesia as may be determined necessary by my physician: Operation/Procedure name (s) FLEXIBLE SIGMOIDOSCOPY on [address] [facility]   2. I recognize that during the surgical operation/procedure, unforeseen conditions may necessitate additional or different procedures than those listed above. I, therefore, further authorize and request that the above-named surgeon, assistants, or designees perform such procedures as are, in their judgment, necessary and desirable. 3.   My surgeon/physician has discussed prior to my surgery the potential benefits, risks and side effects of this procedure; the likelihood of achieving goals; and potential problems that might occur during recuperation. They also discussed reasonable alternatives to the procedure, including risks, benefits, and side effects related to the alternatives and risks related to not receiving this procedure. I have had all my questions answered and I acknowledge that no guarantee has been made as to the result that may be obtained. 4.   Should the need arise during my operation/procedure, which includes change of level of care prior to discharge, I also consent to the administration of blood and/or blood products. Further, I understand that despite careful testing and screening of blood or blood products by collecting agencies, I may still be subject to ill effects as a result of receiving a blood transfusion and/or blood products.   The following are some, but not all, of the potential risks that can occur: fever and allergic reactions, hemolytic reactions, transmission of diseases such as Hepatitis, AIDS and Cytomegalovirus (CMV) and fluid overload. In the event that I wish to have an autologous transfusion of my own blood, or a directed donor transfusion, I will discuss this with my physician. Check only if Refusing Blood or Blood Products  I understand refusal of blood or blood products as deemed necessary by my physician may have serious consequences to my condition to include possible death. I hereby assume responsibility for my refusal and release the hospital, its personnel, and my physicians from any responsibility for the consequences of my refusal.    o  Refuse   5. I authorize the use of any specimen, organs, tissues, body parts or foreign objects that may be removed from my body during the operation/procedure for diagnosis, research or teaching purposes and their subsequent disposal by hospital authorities. I also authorize the release of specimen test results and/or written reports to my treating physician on the hospital medical staff or other referring or consulting physicians involved in my care, at the discretion of the Pathologist or my treating physician. 6.   I consent to the photographing or videotaping of the operations or procedures to be performed, including appropriate portions of my body for medical, scientific, or educational purposes, provided my identity is not revealed by the pictures or by descriptive texts accompanying them. If the procedure has been photographed/videotaped, the surgeon will obtain the original picture, image, videotape or CD. The hospital will not be responsible for storage, release or maintenance of the picture, image, tape or CD.    7.   I consent to the presence of a  or observers in the operating room as deemed necessary by my physician or their designees.     8.   I recognize that in the event my procedure results in extended X-Ray/fluoroscopy time, I may develop a skin reaction. 9. If I have a Do Not Attempt Resuscitation (DNAR) order in place, that status will be suspended while in the operating room, procedural suite, and during the recovery period unless otherwise explicitly stated by me (or a person authorized to consent on my behalf). The surgeon or my attending physician will determine when the applicable recovery period ends for purposes of reinstating the DNAR order. 10. Patients having a sterilization procedure: I understand that if the procedure is successful the results will be permanent and it will therefore be impossible for me to inseminate, conceive, or bear children. I also understand that the procedure is intended to result in sterility, although the result has not been guaranteed. 11. I acknowledge that my physician has explained sedation/analgesia administration to me including the risk and benefits I consent to the administration of sedation/analgesia as may be necessary or desirable in the judgment of my physician. I CERTIFY THAT I HAVE READ AND FULLY UNDERSTAND THE ABOVE CONSENT TO OPERATION and/or OTHER PROCEDURE.     _________________________________________ _________________________________     ___________________________________  Signature of Patient     Signature of Responsible Person                   Printed Name of Responsible Person                              _________________________________________ ______________________________        ___________________________________  Signature of Witness         Date  Time         Relationship to Patient    STATEMENT OF PHYSICIAN My signature below affirms that prior to the time of the procedure; I have explained to the patient and/or his/her legal representative, the risks and benefits involved in the proposed treatment and any reasonable alternative to the proposed treatment.  I have also explained the risks and benefits involved in refusal of the proposed treatment and alternatives to the proposed treatment and have answered the patient's questions.  If I have a significant financial interest in a co-management agreement or a significant financial interest in any product or implant, or other significant relationship used in this procedure/surgery, I have disclosed this and had a discussion with my patient.     _______________________________________________________________ _____________________________  (Signature of Physician)                                                                                         (Date)                                   (Time)  Patient Name: John Paul Mcdonald    : 2000   Printed: 3/3/2023      Medical Record #: [id number]                                              Page 1 of 1

## (undated) NOTE — Clinical Note
Hi,  She has been on entyvio every 8 weeks. She described increased abd cramping 1 week prior to infusions, so I ordered levels/ab. Last flexsig 4/2021 and prior to starting infusions. 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 Thanks!   Ugo Zaragoza